# Patient Record
Sex: FEMALE | Race: WHITE | NOT HISPANIC OR LATINO | Employment: OTHER | URBAN - METROPOLITAN AREA
[De-identification: names, ages, dates, MRNs, and addresses within clinical notes are randomized per-mention and may not be internally consistent; named-entity substitution may affect disease eponyms.]

---

## 2017-03-23 ENCOUNTER — GENERIC CONVERSION - ENCOUNTER (OUTPATIENT)
Dept: OTHER | Facility: OTHER | Age: 82
End: 2017-03-23

## 2017-06-14 DIAGNOSIS — Z12.31 ENCOUNTER FOR SCREENING MAMMOGRAM FOR MALIGNANT NEOPLASM OF BREAST: ICD-10-CM

## 2017-06-14 DIAGNOSIS — E78.5 HYPERLIPIDEMIA: ICD-10-CM

## 2017-06-14 DIAGNOSIS — M79.671 PAIN OF RIGHT FOOT: ICD-10-CM

## 2017-06-14 DIAGNOSIS — M85.80 OTHER SPECIFIED DISORDERS OF BONE DENSITY AND STRUCTURE, UNSPECIFIED SITE: ICD-10-CM

## 2017-06-21 ENCOUNTER — HOSPITAL ENCOUNTER (OUTPATIENT)
Dept: RADIOLOGY | Facility: HOSPITAL | Age: 82
Discharge: HOME | End: 2017-06-21
Payer: MEDICARE

## 2017-06-21 ENCOUNTER — APPOINTMENT (OUTPATIENT)
Dept: LAB | Facility: HOSPITAL | Age: 82
End: 2017-06-21
Payer: MEDICARE

## 2017-06-21 ENCOUNTER — ALLSCRIPTS OFFICE VISIT (OUTPATIENT)
Dept: OTHER | Facility: OTHER | Age: 82
End: 2017-06-21

## 2017-06-21 ENCOUNTER — TRANSCRIBE ORDERS (OUTPATIENT)
Dept: ADMINISTRATIVE | Facility: HOSPITAL | Age: 82
End: 2017-06-21

## 2017-06-21 DIAGNOSIS — M85.80 OTHER SPECIFIED DISORDERS OF BONE DENSITY AND STRUCTURE, UNSPECIFIED SITE: ICD-10-CM

## 2017-06-21 DIAGNOSIS — E78.5 HYPERLIPIDEMIA: ICD-10-CM

## 2017-06-21 DIAGNOSIS — M79.671 PAIN OF RIGHT FOOT: ICD-10-CM

## 2017-06-21 LAB
25(OH)D3 SERPL-MCNC: 31.1 NG/ML (ref 30–100)
ALBUMIN SERPL BCP-MCNC: 3.8 G/DL (ref 3.5–5)
ALP SERPL-CCNC: 140 U/L (ref 46–116)
ALT SERPL W P-5'-P-CCNC: 30 U/L (ref 12–78)
ANION GAP SERPL CALCULATED.3IONS-SCNC: 6 MMOL/L (ref 4–13)
AST SERPL W P-5'-P-CCNC: 27 U/L (ref 5–45)
BILIRUB SERPL-MCNC: 0.7 MG/DL (ref 0.2–1)
BUN SERPL-MCNC: 18 MG/DL (ref 5–25)
CALCIUM SERPL-MCNC: 9.4 MG/DL (ref 8.3–10.1)
CHLORIDE SERPL-SCNC: 106 MMOL/L (ref 100–108)
CHOLEST SERPL-MCNC: 184 MG/DL (ref 50–200)
CO2 SERPL-SCNC: 30 MMOL/L (ref 21–32)
CREAT SERPL-MCNC: 0.89 MG/DL (ref 0.6–1.3)
ERYTHROCYTE [DISTWIDTH] IN BLOOD BY AUTOMATED COUNT: 13.4 % (ref 11.6–15.1)
GFR SERPL CREATININE-BSD FRML MDRD: >60 ML/MIN/1.73SQ M
GLUCOSE P FAST SERPL-MCNC: 97 MG/DL (ref 65–99)
HCT VFR BLD AUTO: 45.1 % (ref 34.8–46.1)
HDLC SERPL-MCNC: 48 MG/DL (ref 40–60)
HGB BLD-MCNC: 14.6 G/DL (ref 11.5–15.4)
LDLC SERPL CALC-MCNC: 103 MG/DL (ref 0–100)
MCH RBC QN AUTO: 28.7 PG (ref 26.8–34.3)
MCHC RBC AUTO-ENTMCNC: 32.4 G/DL (ref 31.4–37.4)
MCV RBC AUTO: 89 FL (ref 82–98)
PLATELET # BLD AUTO: 268 THOUSANDS/UL (ref 149–390)
PMV BLD AUTO: 10.4 FL (ref 8.9–12.7)
POTASSIUM SERPL-SCNC: 4.2 MMOL/L (ref 3.5–5.3)
PROT SERPL-MCNC: 7.3 G/DL (ref 6.4–8.2)
RBC # BLD AUTO: 5.08 MILLION/UL (ref 3.81–5.12)
SODIUM SERPL-SCNC: 142 MMOL/L (ref 136–145)
TRIGL SERPL-MCNC: 165 MG/DL
WBC # BLD AUTO: 5.97 THOUSAND/UL (ref 4.31–10.16)

## 2017-06-21 PROCEDURE — 82306 VITAMIN D 25 HYDROXY: CPT

## 2017-06-21 PROCEDURE — 36415 COLL VENOUS BLD VENIPUNCTURE: CPT

## 2017-06-21 PROCEDURE — 80053 COMPREHEN METABOLIC PANEL: CPT

## 2017-06-21 PROCEDURE — 85027 COMPLETE CBC AUTOMATED: CPT

## 2017-06-21 PROCEDURE — 80061 LIPID PANEL: CPT

## 2017-06-21 PROCEDURE — 73630 X-RAY EXAM OF FOOT: CPT

## 2017-06-22 ENCOUNTER — GENERIC CONVERSION - ENCOUNTER (OUTPATIENT)
Dept: OTHER | Facility: OTHER | Age: 82
End: 2017-06-22

## 2017-06-23 ENCOUNTER — GENERIC CONVERSION - ENCOUNTER (OUTPATIENT)
Dept: OTHER | Facility: OTHER | Age: 82
End: 2017-06-23

## 2017-06-30 ENCOUNTER — HOSPITAL ENCOUNTER (OUTPATIENT)
Dept: BONE DENSITY | Facility: IMAGING CENTER | Age: 82
Discharge: HOME | End: 2017-06-30
Payer: MEDICARE

## 2017-06-30 DIAGNOSIS — Z12.31 ENCOUNTER FOR SCREENING MAMMOGRAM FOR MALIGNANT NEOPLASM OF BREAST: ICD-10-CM

## 2017-06-30 PROCEDURE — G0202 SCR MAMMO BI INCL CAD: HCPCS

## 2017-07-03 ENCOUNTER — GENERIC CONVERSION - ENCOUNTER (OUTPATIENT)
Dept: OTHER | Facility: OTHER | Age: 82
End: 2017-07-03

## 2017-08-17 ENCOUNTER — GENERIC CONVERSION - ENCOUNTER (OUTPATIENT)
Dept: OTHER | Facility: OTHER | Age: 82
End: 2017-08-17

## 2017-09-28 ENCOUNTER — GENERIC CONVERSION - ENCOUNTER (OUTPATIENT)
Dept: FAMILY MEDICINE CLINIC | Facility: HOSPITAL | Age: 82
End: 2017-09-28

## 2017-12-11 DIAGNOSIS — F32.9 MAJOR DEPRESSIVE DISORDER, SINGLE EPISODE: ICD-10-CM

## 2017-12-11 DIAGNOSIS — E78.5 HYPERLIPIDEMIA: ICD-10-CM

## 2017-12-11 DIAGNOSIS — M85.80 OTHER SPECIFIED DISORDERS OF BONE DENSITY AND STRUCTURE, UNSPECIFIED SITE (CODE): ICD-10-CM

## 2017-12-20 ENCOUNTER — TRANSCRIBE ORDERS (OUTPATIENT)
Dept: ADMINISTRATIVE | Facility: HOSPITAL | Age: 82
End: 2017-12-20

## 2017-12-20 ENCOUNTER — ALLSCRIPTS OFFICE VISIT (OUTPATIENT)
Dept: OTHER | Facility: OTHER | Age: 82
End: 2017-12-20

## 2017-12-20 ENCOUNTER — APPOINTMENT (OUTPATIENT)
Dept: LAB | Facility: HOSPITAL | Age: 82
End: 2017-12-20
Payer: MEDICARE

## 2017-12-20 ENCOUNTER — GENERIC CONVERSION - ENCOUNTER (OUTPATIENT)
Dept: OTHER | Facility: OTHER | Age: 82
End: 2017-12-20

## 2017-12-20 DIAGNOSIS — E78.5 HYPERLIPIDEMIA: ICD-10-CM

## 2017-12-20 DIAGNOSIS — F32.9 MAJOR DEPRESSIVE DISORDER, SINGLE EPISODE: ICD-10-CM

## 2017-12-20 LAB
ALBUMIN SERPL BCP-MCNC: 3.8 G/DL (ref 3.5–5)
ALP SERPL-CCNC: 143 U/L (ref 46–116)
ALT SERPL W P-5'-P-CCNC: 29 U/L (ref 12–78)
ANION GAP SERPL CALCULATED.3IONS-SCNC: 8 MMOL/L (ref 4–13)
AST SERPL W P-5'-P-CCNC: 22 U/L (ref 5–45)
BILIRUB SERPL-MCNC: 0.6 MG/DL (ref 0.2–1)
BUN SERPL-MCNC: 23 MG/DL (ref 5–25)
CALCIUM SERPL-MCNC: 9.2 MG/DL (ref 8.3–10.1)
CHLORIDE SERPL-SCNC: 106 MMOL/L (ref 100–108)
CHOLEST SERPL-MCNC: 192 MG/DL (ref 50–200)
CO2 SERPL-SCNC: 27 MMOL/L (ref 21–32)
CREAT SERPL-MCNC: 0.9 MG/DL (ref 0.6–1.3)
ERYTHROCYTE [DISTWIDTH] IN BLOOD BY AUTOMATED COUNT: 13.4 % (ref 11.6–15.1)
GFR SERPL CREATININE-BSD FRML MDRD: 61 ML/MIN/1.73SQ M
GLUCOSE P FAST SERPL-MCNC: 102 MG/DL (ref 65–99)
HCT VFR BLD AUTO: 45.4 % (ref 34.8–46.1)
HDLC SERPL-MCNC: 55 MG/DL (ref 40–60)
HGB BLD-MCNC: 14.9 G/DL (ref 11.5–15.4)
LDLC SERPL CALC-MCNC: 113 MG/DL (ref 0–100)
MCH RBC QN AUTO: 29.2 PG (ref 26.8–34.3)
MCHC RBC AUTO-ENTMCNC: 32.8 G/DL (ref 31.4–37.4)
MCV RBC AUTO: 89 FL (ref 82–98)
PLATELET # BLD AUTO: 276 THOUSANDS/UL (ref 149–390)
PMV BLD AUTO: 10.7 FL (ref 8.9–12.7)
POTASSIUM SERPL-SCNC: 3.9 MMOL/L (ref 3.5–5.3)
PROT SERPL-MCNC: 7.2 G/DL (ref 6.4–8.2)
RBC # BLD AUTO: 5.11 MILLION/UL (ref 3.81–5.12)
SODIUM SERPL-SCNC: 141 MMOL/L (ref 136–145)
TRIGL SERPL-MCNC: 122 MG/DL
TSH SERPL DL<=0.05 MIU/L-ACNC: 2 UIU/ML (ref 0.36–3.74)
WBC # BLD AUTO: 6.03 THOUSAND/UL (ref 4.31–10.16)

## 2017-12-20 PROCEDURE — 84443 ASSAY THYROID STIM HORMONE: CPT

## 2017-12-20 PROCEDURE — 85027 COMPLETE CBC AUTOMATED: CPT

## 2017-12-20 PROCEDURE — 80061 LIPID PANEL: CPT

## 2017-12-20 PROCEDURE — 80053 COMPREHEN METABOLIC PANEL: CPT

## 2017-12-20 PROCEDURE — 36415 COLL VENOUS BLD VENIPUNCTURE: CPT

## 2018-01-11 NOTE — RESULT NOTES
Discussion/Summary   All labs excellent including sugar, cholesterol and vitamin D      Verified Results  (1) CBC/ PLT (NO DIFF) 21Jun2017 10:05AM Hayley Epperson Order Number: [id number]     Test Name Result Flag Reference   HEMATOCRIT 45 1 %  34 8-46 1   HEMOGLOBIN 14 6 g/dL  11 5-15 4   MCHC 32 4 g/dL  31 4-37 4   MCH 28 7 pg  26 8-34 3   MCV 89 fL  82-98   PLATELET COUNT 922 Thousands/uL  149-390   RBC COUNT 5 08 Million/uL  3 81-5 12   RDW 13 4 %  11 6-15 1   WBC COUNT 5 97 Thousand/uL  4 31-10 16   MPV 10 4 fL  8 9-12 7     (1) COMPREHENSIVE METABOLIC PANEL 98SNQ6937 17:47DP Hayley Epperson Order Number: [id number]     Test Name Result Flag Reference   SODIUM 142 mmol/L  136-145   POTASSIUM 4 2 mmol/L  3 5-5 3   CHLORIDE 106 mmol/L  100-108   CARBON DIOXIDE 30 mmol/L  21-32   ANION GAP (CALC) 6 mmol/L  4-13   BLOOD UREA NITROGEN 18 mg/dL  5-25   CREATININE 0 89 mg/dL  0 60-1 30   Standardized to IDMS reference method   CALCIUM 9 4 mg/dL  8 3-10 1   BILI, TOTAL 0 70 mg/dL  0 20-1 00   ALK PHOSPHATAS 140 U/L H    ALT (SGPT) 30 U/L  12-78   AST(SGOT) 27 U/L  5-45   ALBUMIN 3 8 g/dL  3 5-5 0   TOTAL PROTEIN 7 3 g/dL  6 4-8 2   eGFR Non-African American      >60 0 ml/min/1 73sq [facility] Disease Education Program recommendations are as follows:  GFR calculation is accurate only with a steady state creatinine  Chronic Kidney disease less than 60 ml/min/1 73 sq  meters  Kidney failure less than 15 ml/min/1 73 sq  meters  GLUCOSE FASTING 97 mg/dL  65-99     (1) LIPID PANEL, FASTING 21Jun2017 10:05AM Hayley Epperson Order Number: [id number]     Test Name Result Flag Reference   CHOLESTEROL 184 mg/dL     HDL,DIRECT 48 mg/dL  40-60   Specimen collection should occur prior to Metamizole administration due to the potential for falsely depressed results     LDL CHOLESTEROL CALCULATED 103 mg/dL H 0-100   Triglyceride:         Normal              <150 mg/dl       Borderline High    150-199 mg/dl       High               200-499 mg/dl       Very High          >499 mg/dl  Cholesterol:         Desirable        <200 mg/dl      Borderline High  200-239 mg/dl      High             >239 mg/dl  HDL Cholesterol:        High    >59 mg/dL      Low     <41 mg/dL  LDL CALCULATED:    This screening LDL is a calculated result  It does not have the accuracy of the Direct Measured LDL in the monitoring of patients with hyperlipidemia and/or statin therapy  Direct Measure LDL (AIH013) must be ordered separately in these patients  TRIGLYCERIDES 165 mg/dL H <=150   Specimen collection should occur prior to N-Acetylcysteine or Metamizole administration due to the potential for falsely depressed results  (1) VITAMIN D 25-HYDROXY 21Jun2017 10:05AM [facility] Order Number: [id number]     Test Name Result Flag Reference   VIT D 25-HYDROX 31 1 ng/mL  30 0-100 0   This assay is a certified procedure of the CDC Vitamin D Standardization Certification Program (VDSCP)     Deficiency <20ng/ml   Insufficiency 20-30ng/ml   Sufficient  ng/ml     *Patients undergoing fluorescein dye angiography may retain small amounts of fluorescein in the body for 48-72 hours post procedure  Samples containing fluorescein can produce falsely elevated Vitamin D values  If the patient had this procedure, a specimen should be resubmitted post fluorescein clearance            Patient notified of results

## 2018-01-13 VITALS
BODY MASS INDEX: 27.79 KG/M2 | SYSTOLIC BLOOD PRESSURE: 100 MMHG | HEART RATE: 68 BPM | WEIGHT: 151 LBS | HEIGHT: 62 IN | DIASTOLIC BLOOD PRESSURE: 60 MMHG | TEMPERATURE: 99.2 F

## 2018-01-14 NOTE — RESULT NOTES
Verified Results  (1) CBC/ PLT (NO DIFF) [id number] 09:43AM Tsering Smith Order Number: [id number]     Test Name Result Flag Reference   HEMATOCRIT 45 9 %  34 8-46 1   HEMOGLOBIN 15 0 g/dL  11 5-15 4   MCHC 32 7 g/dL  31 4-37 4   MCH 29 0 pg  26 8-34 3   MCV 89 fL  82-98   PLATELET COUNT 102 Thousands/uL  149-390   RBC COUNT 5 18 Million/uL H 3 81-5 12   RDW 13 3 %  11 6-15 1   WBC COUNT 5 42 Thousand/uL  4 31-10 16   MPV 10 3 fL  8 9-12 7     (1) COMPREHENSIVE METABOLIC PANEL 40HSO6872 58:27CS Tsering Smith Order Number: [id number]     Test Name Result Flag Reference   GLUCOSE,RANDM 97 mg/dL     If the patient is fasting, the ADA then defines impaired fasting glucose as > 100 mg/dL and diabetes as > or equal to 123 mg/dL  SODIUM 147 mmol/L H 136-145   POTASSIUM 3 6 mmol/L  3 5-5 3   CHLORIDE 107 mmol/L  100-108   CARBON DIOXIDE 30 mmol/L  21-32   ANION GAP (CALC) 10 mmol/L  4-13   BLOOD UREA NITROGEN 13 mg/dL  5-25   CREATININE 0 86 mg/dL  0 60-1 30   Standardized to IDMS reference method   CALCIUM 9 1 mg/dL  8 3-10 1   BILI, TOTAL 0 70 mg/dL  0 20-1 00   ALK PHOSPHATAS 168 U/L H    ALT (SGPT) 27 U/L  12-78   AST(SGOT) 27 U/L  5-45   ALBUMIN 3 6 g/dL  3 5-5 0   TOTAL PROTEIN 7 0 g/dL  6 4-8 2   eGFR Non-African American      >60 0 ml/min/1 73sq m   - Patient Instructions: This is a fasting blood test  Water,black tea or black  coffee only after 9:00pm the night before test Drink 2 glasses of water the morning of test - Patient Instructions: This bloodwork is non-fasting  Please drink two glasses of   water morning of bloodwork  National Kidney Disease Education Program recommendations are as follows:  GFR calculation is accurate only with a steady state creatinine  Chronic Kidney disease less than 60 ml/min/1 73 sq  meters  Kidney failure less than 15 ml/min/1 73 sq  meters       (1) LIPID PANEL, FASTING 36Hkj8656 09:43AM [address]   TW Order Number: [id number]     Test Name Result Flag Reference   CHOLESTEROL 194 mg/dL     HDL,DIRECT 61 mg/dL H 40-60   Specimen collection should occur prior to Metamizole administration due to the potential for falsely depressed results  LDL CHOLESTEROL CALCULATED 105 mg/dL H 0-100   - Patient Instructions: This is a fasting blood test  Water,black tea or black  coffee only after 9:00pm the night before test   Drink 2 glasses of water the morning of test     - Patient Instructions: This is a fasting blood test  Water,black tea or black  coffee only after 9:00pm the night before test Drink 2 glasses of water the morning of test - Patient Instructions: This bloodwork is non-fasting  Please drink two glasses of   water morning of bloodwork  Triglyceride:         Normal              <150 mg/dl       Borderline High    150-199 mg/dl       High               200-499 mg/dl       Very High          >499 mg/dl  Cholesterol:         Desirable        <200 mg/dl      Borderline High  200-239 mg/dl      High             >239 mg/dl  HDL Cholesterol:        High    >59 mg/dL      Low     <41 mg/dL  LDL CALCULATED:    This screening LDL is a calculated result  It does not have the accuracy of the Direct Measured LDL in the monitoring of patients with hyperlipidemia and/or statin therapy  Direct Measure LDL (OPQ224) must be ordered separately in these patients  TRIGLYCERIDES 139 mg/dL  <=150   Specimen collection should occur prior to N-Acetylcysteine or Metamizole administration due to the potential for falsely depressed results  (1) TSH 52Qru7586 09:43AM Garrett Roe Order Number: [id number]     Test Name Result Flag Reference   TSH 1 370 uIU/mL  0 358-3 740   - Patient Instructions: This bloodwork is non-fasting  Please drink two glasses of water morning of bloodwork  - Patient Instructions:  This is a fasting blood test  Water,black tea or black  coffee only after 9:00pm the night before test Drink 2 glasses of water the morning of test - Patient Instructions: This bloodwork is non-fasting  Please drink two glasses of   water morning of bloodwork  Patients undergoing fluorescein dye angiography may retain small amounts of fluorescein in the body for 48-72 hours post procedure  Samples containing fluorescein can produce falsely depressed TSH values  If the patient had this procedure,a specimen should be resubmitted post fluorescein clearance  The recommended reference ranges for TSH during pregnancy are as follows:  First trimester 0 1 to 2 5 uIU/mL  Second trimester  0 2 to 3 0 uIU/mL  Third trimester 0 3 to 3 0 uIU/m       Discussion/Summary   All labs excellent for sugar, cholesterol, liver kidney and thyroid

## 2018-01-15 NOTE — PROGRESS NOTES
Assessment    1  Encounter for preventive health examination (V70 0) (Z00 00)   2  Hyperlipidemia (272 4) (E78 5)   3  Anxiety disorder (300 00) (F41 9)   4  Benign paroxysmal positional vertigo (386 11) (H81 10)    Plan  Dizziness    · Meclizine HCl - 25 MG Oral Tablet (Antivert); TAKE 1 TABLET EVERY 6 HOURS  AS NEEDED FOR DIZZINESS  Health Maintenance    · *VB - Fall Risk Assessment  (Dx Z13 89 Screen for Neurologic Disorder);  Status:Complete;   Done: 98UQJ2291 09:13AM   · *VB - Urinary Incontinence Screen (Dx Z13 89 Screen for UI); Status:Complete;   Done:  31WQE8394 09:13AM   · Eat a low fat and low cholesterol diet ; Status:Complete;   Done: 27NQG6191   · Stretch and warm up your muscles during the first 10 minutes , then cool down your  muscles for the last 10 minutes of exercise ; Status:Complete;   Done: 06ASP8020   · The plan of care for falls is detailed in the plan and/or discussion section of today's note ;  Status:Complete;   Done: 75GPX5356   · The plan of care for urinary incontinence is detailed in the plan and/or discussion section  of today's note ; Status:Complete;   Done: 57XTJ6197   · We recommend that you create an advance directive ; Status:Complete;   Done:  77CZF6559   · Follow-up visit in 6 months Evaluation and Treatment  Follow-up  Status: Hold For -  Scheduling  Requested for: 65Txo7413  Hyperlipidemia    · (1) CBC/ PLT (NO DIFF); Status:Active; Requested for:51Ajl8003;    · (1) COMPREHENSIVE METABOLIC PANEL; Status:Active; Requested for:31Jri3057;    · (1) LIPID PANEL, FASTING; Status:Active; Requested for:97Kgg2895;    · (1) TSH; Status:Active; Requested for:25Amf8865;     Discussion/Summary    Medically stable on minimal medications  Advise probiotics for abdominal bloating  Impression: Subsequent Annual Wellness Visit, with preventive exam as well as age and risk appropriate counseling completed       Cardiovascular screening and counseling: the risks and benefits of screening were discussed  Diabetes screening and counseling: screening is current  Colorectal cancer screening and counseling: screening is current  Breast cancer screening and counseling: screening is current  Cervical cancer screening and counseling: screening not indicated  Osteoporosis screening and counseling: due for DXA axial skeleton  Abdominal aortic aneurysm screening and counseling: screening not indicated  Glaucoma screening and counseling: screening is current  HIV screening and counseling: screening not indicated  Immunizations: influenza vaccine is due today, the patient declines the pneumococcal vaccination, hepatitis B vaccination series is not indicated at this time due to the patient's low risk of irma the disease, the patient declines the Zostavax vaccine, the patient declines the Td vaccine and the patient declines the Tdap vaccine  Advance Directive Planning: not complete, paperwork and instructions were given to the patient  Advice and education were given regarding fall risk reduction  She was referred to none  Medical Equipment/Suppliers: none  Patient Discussion: plan discussed with the patient, follow-up visit needed in 6 month  Patient is able to Self-Care  Chief Complaint  HM      History of Present Illness  HPI: Some occasional urinary frequency and usually is not an issue  Ongoing problem with dizziness, Meclizine is so-so  Using Pristiq and discussed all the side effects of sweating and some constipation  Bloating at times in the abdomen       Welcome to Medicare and Wellness Visits: The patient is being seen for the subsequent annual wellness visit  Medicare Screening and Risk Factors   Hospitalizations: no previous hospitalizations  Once per lifetime medicare screening tests: ECG has not been done and AAA screening US has not yet been done  Medicare Screening Tests Risk Questions   Abdominal aortic aneurysm risk assessment: none indicated  Osteoporosis risk assessment: , female gender and over 48years of age  HIV risk assessment: none indicated  Drug and Alcohol Use: The patient has never smoked cigarettes and has never used smokeless tobacco  The patient reports occasional alcohol use  She has never used illicit drugs  Diet and Physical Activity: Current diet includes well balanced meals, 1 servings of fruit per day, 1 servings of vegetables per day, 1 servings of dairy products per day and 1 cups of coffee per day  She exercises infrequently and exercises daily  Exercise: walking  Mood Disorder and Cognitive Impairment Screening: PHQ-9 Depression Scale   Depression screening  negative for symptoms  She denies feeling down, depressed, or hopeless over the past two weeks  She denies feeling little interest or pleasure in doing things over the past two weeks  Cognitive impairment screening: denies difficulty learning/retaining new information, denies difficulty handling complex tasks, denies difficulty with reasoning, denies difficulty with spatial ability and orientation, denies difficulty with language and denies difficulty with behavior  Functional Ability/Level of Safety: Hearing is normal bilaterally, normal in the right ear, normal in the left ear and a hearing aid is not used  The patient is currently able to do activities of daily living with limitations and unable to participate in social activities, but able to do instrumental activities of daily living without limitations and able to drive without limitations  Activities of daily living details: does not need help using the phone, no transportation help needed, does not need help shopping, no meal preparation help needed, does not need help doing housework, does not need help doing laundry, does not need help managing medications and does not need help managing money   Fall risk factors:  antidepressant use and visual impairment, but no urinary incontinence and no cognitive impairment  Home safety risk factors:  no unfamiliar surroundings, no loose rugs and no poor household lighting  Advance Directives: Advance directives: living will and durable power of  for health care directives, but no advance directives  end of life decisions were reviewed with the patient and I agree with the patient's decisions  Co-Managers and Medical Equipment/Suppliers: See Patient Care Team       Reviewed Updated [facility]:   Last Medicare Wellness Visit Information was reviewed, patient interviewed and updates made to the record today  Preventive Quality Program 65 and Older: Falls Risk: The patient fell 0 times in the past 12 months  The patient is currently asymptomatic Symptoms Include:  Associated symptoms:  No associated symptoms are reported no impaired mobility and no impaired ability to live independently  Urinary Incontinence Symptoms includes: urinary frequency and nocturia    Avoid fluids in the evening  Patient Care Team    Care Team Member Role Specialty Office Number   García Antonio MD  Family Medicine [phone]     Review of Systems    Constitutional: no malaise and no fatigue  ENT: no nasal congestion and no scratchy throat  Cardiovascular: no chest pain, the heart is not racing and no lightheadedness  Respiratory: no shortness of breath and no cough  Gastrointestinal: no nausea and no constipation  Genitourinary: no dysuria  Neurological: no headache  Psychiatric: depression, but as noted in HPI  Over the past 2 weeks, how often have you been bothered by the following problems? 1 ) Little interest or pleasure in doing things? Not at all    2 ) Feeling down, depressed or hopeless? Not at all    3 ) Trouble falling asleep or sleeping too much? Not at all    4 ) Feeling tired or having little energy? Not at all    5 ) Poor appetite or overeating?  Not at all    6 ) Feeling bad about yourself, or that you are a failure, or have let yourself or your family down? Not at all    7 ) Trouble concentrating on things, such as reading a newspaper or watching television? Not at all    8 ) Moving or speaking so slowly that other people could have noticed, or the opposite, moving or speaking faster than usual? Not at all    9 ) Thoughts that you would be better off dead or of hurting yourself in some way? Not at all  Score 0      Active Problems    1  Abdominal pain, epigastric (789 06) (R10 13)   2  Anorexia (783 0) (R63 0)   3  Anxiety disorder (300 00) (F41 9)   4  Atypical chest pain (786 59) (R07 89)   5  Benign paroxysmal positional vertigo (386 11) (H81 10)   6  Depression (311) (F32 9)   7  Dizziness (780 4) (R42)   8  Dysfunction of eustachian tube, unspecified laterality (381 81) (H69 80)   9  Encounter for screening colonoscopy (V76 51) (Z12 11)   10  Encounter for screening mammogram for malignant neoplasm of breast (V76 12)    (Z12 31)   11  Esophagitis, reflux (530 11) (K21 0)   12  Glaucoma (365 9) (H40 9)   13  Hyperlipidemia (272 4) (E78 5)   14  Osteopenia (733 90) (M85 80)   15  Screening for osteoporosis (V82 81) (Z13 820)    Past Medical History    · History of acute bronchitis (V12 69) (Z87 09)    The active problems and past medical history were reviewed and updated today  Family History  Mother    · Family history of Hypertension (V17 49)  Father    · Family history of cerebrovascular accident (V17 1) (Z82 3)    The family history was reviewed and updated today  Social History    · Being A Social Drinker   · Marital History -  (V61 03)   · Never A Smoker   · Non-smoker (V49 89) (Z78 9)  The social history was reviewed and updated today  The social history was reviewed and is unchanged  Current Meds   1  Align Oral Capsule; USE AS DIRECTED; Therapy: 38VWT7743 to Recorded   2  ALPRAZolam 0 5 MG Oral Tablet; TAKE 1 TAB EVERY 4-6 HOURS AS NEEDED FOR   ANXIETY;    Therapy: 10NOX5733 to (Last Rx:01Sep2016) Ordered   3  Aspirin EC 81 MG Oral Tablet Delayed Release; Therapy: (Gama Kellogg) to Recorded   4  Atorvastatin Calcium 20 MG Oral Tablet; take 1 tablet by mouth every day; Therapy: 61Ewy8344 to (Evaluate:06Jan2017)  Requested for: 12Jan2016; Last   Rx:12Jan2016 Ordered   5  Azopt 1 % Ophthalmic Suspension; Therapy: 12RFZ3399 to (Evaluate:24Jhu6200) Recorded   6  Fish Oil CAPS Recorded   7  FLUoxetine HCl - 20 MG Oral Capsule; TAKE 2 CAPSULES DAILY; Therapy: 81DKI1991 to (Delta Stade)  Requested for: 11BUC3462; Last   Rx:05Jun2015 Ordered   8  Fluticasone Propionate 50 MCG/ACT Nasal Suspension; USE 2 SPRAYS IN EACH   NOSTRIL ONCE DAILY; Therapy: 27FQW3827 to (Last Gaston Coad)  Requested for: 04NQC4140 Ordered   9  Meclizine HCl - 25 MG Oral Tablet; TAKE 1 TABLET EVERY 6 HOURS AS NEEDED FOR   DIZZINESS; Therapy: 76VPI3351 to (Evaluate:02Mhq3303)  Requested for: 35RQO9197; Last   Rx:05Jun2015 Ordered   10  Multiple Vitamins Oral Tablet; Take 1 tablet daily Recorded   11  Pristiq 50 MG Oral Tablet Extended Release 24 Hour; Take 1 tablet daily; Therapy: 40PKH0150 to (Last Rx:77Goe1569)  Requested for: 95Yfd7140 Ordered   12  Travatan Z 0 004 % Ophthalmic Solution; Therapy: 35CQM5554 to (Evaluate:48Uvv7375) Recorded   13  Vitamin B-12 CHEW;    Therapy: (Recorded:25Jvz8210) to Recorded    The medication list was reviewed and updated today  Allergies    1  No Known Drug Allergies    Immunizations   1    Influenza  Temporarily Deferred: Pt requests deferral    PCV  Temporarily Deferred: Pt refuses    Zoster  Temporarily Deferred: Pt requests deferral     Vitals  Signs    Temperature: 98 5 F  Heart Rate: 62  Systolic: 418  Diastolic: 80  Height: 5 ft 2 in  Weight: 150 lb   BMI Calculated: 27 44  BSA Calculated: 1 69    Physical Exam    Constitutional   General appearance: No acute distress, well appearing and well nourished      Ears, Nose, Mouth, and Throat   External inspection of ears and nose: Normal     Otoscopic examination: Tympanic membranes translucent with normal light reflex  Canals patent without erythema  Hearing: Normal     Neck   Neck: Supple, symmetric, trachea midline, no masses  Thyroid: Normal, no thyromegaly  Pulmonary   Respiratory effort: No increased work of breathing or signs of respiratory distress  Auscultation of lungs: Clear to auscultation  Cardiovascular   Auscultation of heart: Normal rate and rhythm, normal S1 and S2, no murmurs  Carotid pulses: 2+ bilaterally  Peripheral vascular exam: Normal     Examination of extremities for edema and/or varicosities: Normal     Lymphatic   Palpation of lymph nodes in neck: No lymphadenopathy  Musculoskeletal   Gait and station: Normal     Psychiatric   Judgment and insight: Normal     Orientation to person, place, and time: Normal     Recent and remote memory: Intact  Mood and affect: Normal        Procedure    Procedure: Visual Acuity Test    Indication: routine screening  Inforrmation supplied by a Snellen chart     Results: 20/40 in the right eye with corrective device, 20/50 in the left eye with corrective device      Signatures   Electronically signed by : Patsy Calderon MD; Dec 21 2016  9:31AM EST                       (Author)

## 2018-01-15 NOTE — RESULT NOTES
Verified Results  (1) CBC/PLT/DIFF [id number] 11:22AM Lyudmila Wilson Order Number: [id number]   Order Number: [id number]     Test Name Result Flag Reference   WBC COUNT 5 82 Thousand/uL  4 31-10 16   RBC COUNT 5 28 Million/uL H 3 81-5 12   HEMOGLOBIN 15 4 g/dL  11 5-15 4   HEMATOCRIT 47 5 % H 34 8-46  1   MCV 90 fL  82-98   MCH 29 2 pg  26 8-34 3   MCHC 32 4 g/dL  31 4-37 4   RDW 13 6 %  11 6-15 1   MPV 11 1 fL  8 9-12 7   PLATELET COUNT 458 Thousands/uL  149-390   NEUTROPHILS RELATIVE PERCENT 32 % L 43-75   LYMPHOCYTES RELATIVE PERCENT 52 % H 14-44   MONOCYTES RELATIVE PERCENT 12 %  4-12   EOSINOPHILS RELATIVE PERCENT 3 %  0-6   BASOPHILS RELATIVE PERCENT 1 %  0-1   NEUTROPHILS ABSOLUTE COUNT 1 86 Thousands/?L  1 85-7 62   LYMPHOCYTES ABSOLUTE COUNT 3 08 Thousands/?L  0 60-4 47   MONOCYTES ABSOLUTE COUNT 0 68 Thousand/?L  0 17-1 22   EOSINOPHILS ABSOLUTE COUNT 0 17 Thousand/?L  0 00-0 61   BASOPHILS ABSOLUTE COUNT 0 03 Thousands/?L  0 00-0 10     (1) COMPREHENSIVE METABOLIC PANEL 86UXA4237 08:11NG Lyudmila Wilson Order Number: [id number]   Order Number: [id number] Order Number: [id number]     Test Name Result Flag Reference   GLUCOSE,RANDM 101 mg/dL     If the patient is fasting, the ADA then defines impaired fasting glucose as > 100 mg/dL and diabetes as > or equal to 123 mg/dL     SODIUM 144 mmol/L  136-145   POTASSIUM 4 0 mmol/L  3 5-5 3   CHLORIDE 106 mmol/L  100-108   CARBON DIOXIDE 30 mmol/L  21-32   ANION GAP (CALC) 8 mmol/L  4-13   BLOOD UREA NITROGEN 17 mg/dL  5-25   CREATININE 0 90 mg/dL  0 60-1 30   Standardized to IDMS reference method   CALCIUM 9 2 mg/dL  8 3-10 1   BILI, TOTAL 0 70 mg/dL  0 20-1 00   ALK PHOSPHATAS 159 U/L H    ALT (SGPT) 37 U/L  12-78   AST(SGOT) 39 U/L  5-45   ALBUMIN 3 7 g/dL  3 5-5 0   TOTAL PROTEIN 7 3 g/dL  6 4-8 2   eGFR Non-African American      >60 0 ml/min/1 73sq m   National Kidney Disease Education Program recommendations are as follows:  GFR calculation is accurate only with a steady state creatinine  Chronic Kidney disease less than 60 ml/min/1 73 sq  meters  Kidney failure less than 15 ml/min/1 73 sq  meters  (1) LIPID PANEL, FASTING 07OKB2730 11:22AM Pierre Junior Order Number: [id number]   Order Number: [id number] Order Number: [id number]     Test Name Result Flag Reference   CHOLESTEROL 181 mg/dL     HDL,DIRECT 54 mg/dL  40-60   Specimen collection should occur prior to Metamizole administration due to the potential for falsely depressed results  LDL CHOLESTEROL CALCULATED 106 mg/dL H 0-100   Triglyceride:         Normal              <150 mg/dl       Borderline High    150-199 mg/dl       High               200-499 mg/dl       Very High          >499 mg/dl  Cholesterol:         Desirable        <200 mg/dl      Borderline High  200-239 mg/dl      High             >239 mg/dl  HDL Cholesterol:        High    >59 mg/dL      Low     <41 mg/dL  LDL CALCULATED:    This screening LDL is a calculated result  It does not have the accuracy of the Direct Measured LDL in the monitoring of patients with hyperlipidemia and/or statin therapy  Direct Measure LDL (OEP973) must be ordered separately in these patients  TRIGLYCERIDES 106 mg/dL  <=150   Specimen collection should occur prior to N-Acetylcysteine or Metamizole administration due to the potential for falsely depressed results  Pt aware  1      1 Amended By: Pamela Ashley; May 17 2016 10:11 AM EST    Discussion/Summary   Labs are good for sugar, liver and kidney and cholesterol

## 2018-01-16 NOTE — RESULT NOTES
Message   Patient aware of results  1       1 Amended By: Olga Rashid; Jun 01 2016 3:56 PM EST    Verified Results  * MAMMO SCREENING BILATERAL W CAD 01Jun2016 09:53AM Pierre Junior Order Number: [id number]     Test Name Result Flag Reference   MAMMO SCREENING BILATERAL W CAD (Report)     Patient History:   Family history of breast cancer in daughter under age 48  Benign excisional biopsy of the right breast, 1996  Patient has never smoked  Patient's BMI is 25 7  Reason for exam: screening (asymptomatic)  Mammo Screening Bilateral W CAD: June 1, 2016 - Check In #:    [de-identified]   Bilateral CC and MLO view(s) were taken  Technologist: ROHIT Holden (R)(M)   Prior study comparison: May 27, 2015, bilateral OPMA digital scrn   mammo w/CAD performed at [address]  October 25, 2007, bilateral DIGTL UNILAT MAMM/CAD,    performed at [address]  July 20, 2007,    bilateral digital screening mammo w/CAD performed at [address] [facility]  There are scattered fibroglandular densities  The parenchymal pattern appears stable  No dominant soft tissue    mass or suspicious calcifications are noted  Scattered benign    appearing calcifications are seen  The skin and nipple contours    are within normal limits  No mammographic evidence of malignancy  No    significant changes when compared with prior studies  ASSESSMENT: BiRad:1 - Negative     Recommendation:   Routine screening mammogram in 1 year  A reminder letter will be   scheduled  Analyzed by CAD     8-10% of cancers will be missed on mammography  Management of a    palpable abnormality must be based on clinical grounds  Patients   will be notified of their results via letter from our facility  Accredited by [employer] and FDA       Transcription Location: [facility] 98: [id number]     Risk Value(s): Neal-Marlon 10 Year: 2 570%, Neal-Marlon Lifetime: 2 570%,    Myriad Table: 2 6%, HE 5 Year: 4 1%, NCI Lifetime: 6 8%   Signed by:   Wesley More MD   6/1/16       Discussion/Summary   Mammogram normal  Recheck in one year

## 2018-01-16 NOTE — RESULT NOTES
Discussion/Summary   Xray shows heel spur  [facility] evaluation     Verified Results  * XR FOOT 3+ VIEW RIGHT 21Jun2017 10:04AM Serge Mensah Order Number: [id number]     Test Name Result Flag Reference   XR FOOT 3+ VW RIGHT (Report)     RIGHT FOOT     INDICATION: Right foot plantar pain for a month  COMPARISON: None     VIEWS: 3     IMAGES: 3     FINDINGS:     There is no acute fracture or dislocation  Small plantar calcaneal spur  No lytic or blastic lesions are seen  Soft tissues are unremarkable  IMPRESSION:     No acute osseous abnormality  Small plantar calcaneal spur  Workstation performed: [id number]     Signed by:    Mimi Amezcua MD   6/23/17        Pt aware--given number to Dr Christian Mckeon

## 2018-01-17 NOTE — RESULT NOTES
Discussion/Summary   Mammogram is good  Repeat in one year  Verified Results  * MAMMO SCREENING BILATERAL W CAD [id number] 10:50AM Christopher Slade Order Number: [id number]    - Patient Instructions: To schedule this appointment, please contact Central Scheduling at [phone]  Do not wear any perfume, powder, lotion or deodorant on breast or underarm area  Please bring your doctors order, referral (if needed) and insurance information with you on the day of the test  Failure to bring this information may result in this test being rescheduled  Arrive 15 minutes prior to your appointment time to register  On the day of your test, please bring any prior mammogram or breast studies with you that were not performed at a [facility]  Failure to bring prior exams may result in your test needing to be rescheduled  Test Name Result Flag Reference   MAMMO SCREENING BILATERAL W CAD (Report)     Patient History:   Family history of breast cancer under age 48 in daughter  Benign excisional biopsy of the right breast, 1996  Patient has never smoked  Patient's BMI is 25 7  Reason for exam: screening, asymptomatic  Mammo Screening Bilateral W CAD: June 30, 2017 - Check In #:    [de-identified]   Bilateral CC and MLO view(s) were taken  Technologist: ROHIT Galdamez (ROHIT)(M)   Prior study comparison: June 1, 2016, mammo screening bilateral W   CAD performed at [address] [facility]  May 27, 2015, bilateral OPMA digital scrn mammo w/CAD performed    at [address] [facility]  October 25, 2007, bilateral DIGTL UNILAT MAMM/CAD, performed at [facility]  July 20, 2007, bilateral digital screening    mammo w/CAD performed at [address] [facility]  There are scattered fibroglandular densities       No dominant soft tissue mass, architectural distortion or    suspicious calcifications are noted in either breast   The skin    and nipple structures are within normal limits  Scattered benign   appearing calcifications are noted  No evidence of malignancy  No significant changes when compared with prior studies  ACR BI-RADSï¾® Assessments: BiRad:2 - Benign     Recommendation:   Routine screening mammogram of both breasts in 1 year  A    reminder letter will be scheduled  Analyzed by CAD     The patient is scheduled in a reminder system  8-10% of cancers will be missed on mammography  Management of a    palpable abnormality must be based on clinical grounds  Patients   will be notified of their results via letter from our facility  Accredited by [employer] and FDA       Transcription Location: [facility] 98: [id number]     Risk Value(s):   Tyrer-Cuzick 10 Year: 1 900%, Tyrer-Cuzick Lifetime: 1 900%,    Myriad Table: 2 6%, HE 5 Year: 4 0%, NCI Lifetime: 6 1%   Signed by:   Arianne Paulino MD   6/30/17          Patient notified of results

## 2018-01-23 VITALS
DIASTOLIC BLOOD PRESSURE: 72 MMHG | RESPIRATION RATE: 16 BRPM | HEART RATE: 72 BPM | WEIGHT: 153.6 LBS | TEMPERATURE: 98.4 F | BODY MASS INDEX: 28.26 KG/M2 | HEIGHT: 62 IN | SYSTOLIC BLOOD PRESSURE: 122 MMHG

## 2018-01-23 NOTE — PROGRESS NOTES
Assessment    1  Encounter for preventive health examination (V70 0) (Z00 00)   2  Hyperlipidemia (272 4) (E78 5)   3  Depression (311) (F32 9)    Plan  Depression    · (1) TSH WITH FT4 REFLEX; Status:Active; Requested for:45Hpv2751;    · Follow-up visit in 6 months Evaluation and Treatment  Follow-up  Status: Hold For -  Scheduling  Requested for: 96Wtg2838  Glaucoma    · Caroline ESPINOSA, Katy Banegas  (Ophthalmology) Co-Management  *  Status: Hold For - Scheduling   Requested for: 99CKN0829  Care Summary provided  : Yes  Health Maintenance    · *VB - Fall Risk Assessment  (Dx Z13 89 Screen for Neurologic Disorder);  Status:Complete;   Done: 78HYB0476 07:11AM   · *VB - Urinary Incontinence Screen (Dx Z13 89 Screen for UI); Status:Complete;   Done:  87MIO3265 07:11AM   · Follow-up visit in 1 year Evaluation and Treatment  Follow-up  Status: Hold For -  Scheduling  Requested for: 95Tar3924   · Stretch and warm up your muscles during the first 10 minutes , then cool down your  muscles for the last 10 minutes of exercise ; Status:Complete;   Done: 96SHJ4160   · The plan of care for falls is detailed in the plan and/or discussion section of today's note ;  Status:Complete;   Done: 29DYS5474   · The plan of care for urinary incontinence is detailed in the plan and/or discussion section  of today's note ; Status:Complete;   Done: 62EEN7818   · We recommend that you create an advance directive ; Status:Complete;   Done:  29XCB5095  Hyperlipidemia    · (1) CBC/ PLT (NO DIFF); Status:Active; Requested for:84Bna5189;    · (1) COMPREHENSIVE METABOLIC PANEL; Status:Active; Requested for:36Dui6600;    · (1) LIPID PANEL, FASTING; Status:Active; Requested for:09Ips6879;     Discussion/Summary    Medically stable on minimal medications  Depression stable on current medication  Glaucoma checked regularly  Refer to Dr Tere Palafox for evaluation         Impression: Subsequent Annual Wellness Visit, with preventive exam as well as age and risk appropriate counseling completed  Cardiovascular screening and counseling: screening is current  Diabetes screening and counseling: screening is current  Colorectal cancer screening and counseling: screening is current  Breast cancer screening and counseling: due for a screening mammogram    Cervical cancer screening and counseling: screening not indicated  Osteoporosis screening and counseling: the patient declines screening  Abdominal aortic aneurysm screening and counseling: screening not indicated  Glaucoma screening and counseling: screening is current  HIV screening and counseling: screening not indicated  Immunizations: influenza vaccine is due today, the patient declines the pneumococcal vaccination, hepatitis B vaccination series is not indicated at this time due to the patient's low risk of irma the disease, the patient declines the Zostavax vaccine, the patient declines the Td vaccine and the patient declines the Tdap vaccine  Advance Directive Planning: not complete, paperwork and instructions were given to the patient  Advice and education were given regarding fall risk reduction  She was referred to none  Medical Equipment/Suppliers: none  Patient Discussion: plan discussed with the patient, follow-up visit needed in 6 month  Self Referrals: No       Possible side effects of new medications were reviewed with the patient/guardian today  The treatment plan was reviewed with the patient/guardian  The patient/guardian understands and agrees with the treatment plan       Patient is able to Self-Care  Chief Complaint  HM      Advance Directives  Advance Directive [facility]:   NO - Patient does not have an advance health care directive  Capacity/Competence: This patient has full decision making capacity for discussion of advance care planning and This patient has full decision making competency for discussion of advance care planning        History of Present Illness  Welcome to Medicare and Wellness Visits: The patient is being seen for the subsequent annual wellness visit  Medicare Screening and Risk Factors   Hospitalizations: no previous hospitalizations  Once per lifetime medicare screening tests: ECG has not been done and AAA screening US has not yet been done  Medicare Screening Tests Risk Questions   Abdominal aortic aneurysm risk assessment: none indicated  Osteoporosis risk assessment: , female gender and over 48years of age  HIV risk assessment: none indicated  Drug and Alcohol Use: The patient has never smoked cigarettes and has never used smokeless tobacco  The patient reports occasional alcohol use  She has never used illicit drugs  Diet and Physical Activity: Current diet includes well balanced meals, 1 servings of fruit per day, 1 servings of vegetables per day, 1 servings of dairy products per day and 1 cups of coffee per day  She exercises infrequently and exercises daily  Exercise: walking  Mood Disorder and Cognitive Impairment Screening: PHQ-9 Depression Scale   Depression screening  negative for symptoms  She denies feeling down, depressed, or hopeless over the past two weeks  She denies feeling little interest or pleasure in doing things over the past two weeks  Cognitive impairment screening: denies difficulty learning/retaining new information, denies difficulty handling complex tasks, denies difficulty with reasoning, denies difficulty with spatial ability and orientation, denies difficulty with language and denies difficulty with behavior  Advance Directives: Advance directives: living will and durable power of  for health care directives, but no advance directives  end of life decisions were reviewed with the patient and I agree with the patient's decisions     Co-Managers and Medical Equipment/Suppliers: See Patient Care Team       Reviewed Updated [facility]:   Last Medicare Wellness Visit Information was reviewed, patient interviewed and updates made to the record today  Preventive Quality Program 65 and Older: Falls Risk: The patient fell 0 times in the past 12 months  The patient is currently asymptomatic Symptoms Include:  Associated symptoms:  No associated symptoms are reported no impaired mobility and no impaired ability to live independently  Urinary Incontinence Symptoms includes: urinary frequency and nocturia    Avoid fluids in the evening  Patient Care Team    Care Team Member Role Specialty Office Number   Linda Sixto ESPINOSA  Family Medicine [phone]     Review of Systems    Constitutional: no malaise and no fatigue  ENT: no nasal congestion and no scratchy throat  Cardiovascular: no chest pain, the heart is not racing and no lightheadedness  Respiratory: no shortness of breath and no cough  Gastrointestinal: no nausea and no constipation  Genitourinary: no dysuria  Neurological: no headache  Psychiatric: depression, but as noted in HPI  Over the past 2 weeks, how often have you been bothered by the following problems? 1 ) Little interest or pleasure in doing things? Not at all    2 ) Feeling down, depressed or hopeless? Not at all    3 ) Trouble falling asleep or sleeping too much? Not at all    4 ) Feeling tired or having little energy? Not at all    5 ) Poor appetite or overeating? Not at all    6 ) Feeling bad about yourself, or that you are a failure, or have let yourself or your family down? Not at all    7 ) Trouble concentrating on things, such as reading a newspaper or watching television? Not at all    8 ) Moving or speaking so slowly that other people could have noticed, or the opposite, moving or speaking faster than usual? Not at all    9 ) Thoughts that you would be better off dead or of hurting yourself in some way? Not at all  Score 0      Active Problems    1  Anxiety disorder (300 00) (F41 9)   2   Benign paroxysmal positional vertigo (386 11) (H81 10)   3  Depression (311) (F32 9)   4  Dizziness (780 4) (R42)   5  Encounter for screening mammogram for breast cancer (V76 12) (Z12 31)   6  Encounter for screening mammogram for malignant neoplasm of breast (V76 12)   (Z12 31)   7  Esophagitis, reflux (530 11) (K21 0)   8  Glaucoma (365 9) (H40 9)   9  Hyperlipidemia (272 4) (E78 5)   10  Osteopenia (733 90) (M85 80)   11  Pain of right heel (729 5) (M79 671)    Past Medical History    · Denied: History of Alcohol abuse   · Denied: History of substance abuse    The active problems and past medical history were reviewed and updated today  Family History  Mother    · Family history of Hypertension (V17 49)  Father    · Family history of cerebrovascular accident (V17 1) (Z80 1)  Son    · Family history of Alcohol abuse  Family History    · Denied: Family history of substance abuse   · Denied: Family history of Mental health problem    The family history was reviewed and updated today  Social History    · Always uses seat belt   · Being A Social Drinker   · Marital History -  (V61 03)   · Never A Smoker  The social history was reviewed and updated today  The social history was reviewed and is unchanged  Current Meds   1  Align Oral Capsule; USE AS DIRECTED; Therapy: 01EOA4522 to Recorded   2  ALPRAZolam 0 5 MG Oral Tablet; TAKE 1 TAB EVERY 4-6 HOURS AS NEEDED FOR   ANXIETY; Therapy: 25ADR8938 to (Last Rx:29Nov2017) Ordered   3  Aspirin EC 81 MG Oral Tablet Delayed Release; Therapy: (Adebayo Jimenez) to Recorded   4  Atorvastatin Calcium 20 MG Oral Tablet; take 1 tablet by mouth every day; Therapy: 92Vqh9287 to (Evaluate:90Zfy9716)  Requested for: 88QYB4485; Last   Rx:04Jan2017 Ordered   5  Azopt 1 % Ophthalmic Suspension; Therapy: 41ZTS8125 to (Evaluate:54Zmx2642) Recorded   6  Desvenlafaxine Succinate ER 50 MG Oral Tablet Extended Release 24 Hour; Take 1   tablet daily;    Therapy: 73PBK6626 to (Evaluate:03Apr2018)  Requested for: 02KOQ3453; Last   Rx:05Oct2017 Ordered   7  Fish Oil CAPS Recorded   8  FLUoxetine HCl - 20 MG Oral Capsule; TAKE 2 CAPSULES DAILY; Therapy: 24WTC1385 to (Sofia Blackwood)  Requested for: 80TDX2068; Last   Rx:15Wao2920 Ordered   9  Fluticasone Propionate 50 MCG/ACT Nasal Suspension; USE 2 SPRAYS IN EACH   NOSTRIL ONCE DAILY; Therapy: 60IYQ3005 to (Zackary Kim)  Requested for: 71BPI7223 Ordered   10  Meclizine HCl - 25 MG Oral Tablet; TAKE 1 TABLET EVERY 6 HOURS AS NEEDED FOR    DIZZINESS; Therapy: 86XRP5680 to (Evaluate:10Ajp6956)  Requested for: 42PYJ8371; Last    Rx:06Oct2017 Ordered   11  Multiple Vitamins Oral Tablet; Take 1 tablet daily Recorded   12  Travatan Z 0 004 % Ophthalmic Solution; Therapy: 19ZVE4708 to (Evaluate:05Mve4911) Recorded   13  Vitamin B-12 CHEW;    Therapy: (Recorded:46Ioy3036) to Recorded    The medication list was reviewed and updated today  Allergies    1  No Known Drug Allergies    Immunizations   1    Influenza  Temporarily Deferred: Pt requests deferral    PCV  Temporarily Deferred: Pt refuses    Zoster  Temporarily Deferred: Pt requests deferral     Vitals  Signs    Temperature: 98 4 F  Heart Rate: 72  Respiration: 16  Systolic: 997  Diastolic: 72  Height: 5 ft 2 in  Weight: 153 lb 9 6 oz  BMI Calculated: 28 09  BSA Calculated: 1 71    Physical Exam    Constitutional   General appearance: No acute distress, well appearing and well nourished  Ears, Nose, Mouth, and Throat   External inspection of ears and nose: Normal     Otoscopic examination: Tympanic membranes translucent with normal light reflex  Canals patent without erythema  Hearing: Normal     Neck   Neck: Supple, symmetric, trachea midline, no masses  Thyroid: Normal, no thyromegaly  Pulmonary   Respiratory effort: No increased work of breathing or signs of respiratory distress  Auscultation of lungs: Clear to auscultation      Cardiovascular   Auscultation of heart: Normal rate and rhythm, normal S1 and S2, no murmurs  Carotid pulses: 2+ bilaterally  Peripheral vascular exam: Normal     Examination of extremities for edema and/or varicosities: Normal     Lymphatic   Palpation of lymph nodes in neck: No lymphadenopathy  Musculoskeletal   Gait and station: Normal     Psychiatric   Judgment and insight: Normal     Orientation to person, place, and time: Normal     Recent and remote memory: Intact  Mood and affect: Normal        Results/Data  *VB - Fall Risk Assessment  (Dx Z13 89 Screen for Neurologic Disorder) 06Fco1254 07:11AM Harry Sink     Test Name Result Flag Reference   Falls Risk      No falls in the past year     *VB - Urinary Incontinence Screen (Dx Z13 89 Screen for UI) 02Jvm2423 07:11AM Harry Sink     Test Name Result Flag Reference   Urinary Incontinence Assessment 52Jfb5480         Procedure    Procedure: Visual Acuity Test    Indication: routine screening  Inforrmation supplied by a Snellen chart     Results: 20/40 in both eyes with corrective device, 20/50 in the right eye with corrective device, 20/70 in the left eye with corrective device      Signatures   Electronically signed by : Jose Dixon MD; Dec 20 2017 11:04AM EST                       (Author)

## 2018-01-23 NOTE — RESULT NOTES
Discussion/Summary    All labs excellent for sugar, cholesterol, liver, kidney and thyroid  pt aware         Verified Results  (1) CBC/ PLT (NO DIFF) [id number] 11:13AM Zoltan Sands Order Number: [id number]     Test Name Result Flag Reference   HEMATOCRIT 45 4 %  34 8-46 1   HEMOGLOBIN 14 9 g/dL  11 5-15 4   MCHC 32 8 g/dL  31 4-37 4   MCH 29 2 pg  26 8-34 3   MCV 89 fL  82-98   PLATELET COUNT 332 Thousands/uL  149-390   RBC COUNT 5 11 Million/uL  3 81-5 12   RDW 13 4 %  11 6-15 1   WBC COUNT 6 03 Thousand/uL  4 31-10 16   MPV 10 7 fL  8 9-12 7     (1) COMPREHENSIVE METABOLIC PANEL [id number] 24:05VE Zoltan Sands Order Number: [id number]     Test Name Result Flag Reference   SODIUM 141 mmol/L  136-145   POTASSIUM 3 9 mmol/L  3 5-5 3   CHLORIDE 106 mmol/L  100-108   CARBON DIOXIDE 27 mmol/L  21-32   ANION GAP (CALC) 8 mmol/L  4-13   BLOOD UREA NITROGEN 23 mg/dL  5-25   CREATININE 0 90 mg/dL  0 60-1 30   Standardized to IDMS reference method   CALCIUM 9 2 mg/dL  8 3-10 1   BILI, TOTAL 0 60 mg/dL  0 20-1 00   ALK PHOSPHATAS 143 U/L H    ALT (SGPT) 29 U/L  12-78   Specimen collection should occur prior to Sulfasalazine administration due to the potential for falsely depressed results  AST(SGOT) 22 U/L  5-45   Specimen collection should occur prior to Sulfasalazine administration due to the potential for falsely depressed results  ALBUMIN 3 8 g/dL  3 5-5 0   TOTAL PROTEIN 7 2 g/dL  6 4-8 2   eGFR 61 ml/min/1 73sq m     National Kidney Disease Education Program recommendations are as follows:  GFR calculation is accurate only with a steady state creatinine  Chronic Kidney disease less than 60 ml/min/1 73 sq  meters  Kidney failure less than 15 ml/min/1 73 sq  meters  GLUCOSE FASTING 102 mg/dL H 65-99   Specimen collection should occur prior to Sulfasalazine administration due to the potential for falsely depressed results   Specimen collection should occur prior to Sulfapyridine administration due to the potential for falsely elevated results  (1) LIPID PANEL, FASTING 20Dec2017 11:13AM [facility]: [id number]     Test Name Result Flag Reference   CHOLESTEROL 192 mg/dL     HDL,DIRECT 55 mg/dL  40-60   Specimen collection should occur prior to Metamizole administration due to the potential for falsley depressed results  LDL CHOLESTEROL CALCULATED 113 mg/dL H 0-100   Triglyceride:        Normal <150 mg/dl   Borderline High 150-199 mg/dl   High 200-499 mg/dl   Very High >499 mg/dl      Cholesterol:       Desirable <200 mg/dl    Borderline High 200-239 mg/dl    High >239 mg/dl      HDL Cholesterol:       High>59 mg/dL    Low <41 mg/dL      This screening LDL is a calculated result  It does not have the accuracy of the Direct Measured LDL in the monitoring of patients with hyperlipidemia and/or statin therapy  Direct Measure LDL (QMC378) must be ordered separately in these patients  TRIGLYCERIDES 122 mg/dL  <=150   Specimen collection should occur prior to N-Acetylcysteine or Metamizole administration due to the potential for falsely depressed results  (1) TSH WITH FT4 REFLEX 20Dec2017 11:13AM Torin Santee Order Number: [id number]     Test Name Result Flag Reference   TSH 1 997 uIU/mL  0 358-3 740   Patients undergoing fluorescein dye angiography may retain small amounts of fluorescein in the body for 48-72 hours post procedure  Samples containing fluorescein can produce falsely depressed TSH values  If the patient had this procedure,a specimen should be resubmitted post fluorescein clearance            The recommended reference ranges for TSH during pregnancy are as follows:  First trimester 0 1 to 2 5 uIU/mL  Second trimester  0 2 to 3 0 uIU/mL  Third trimester 0 3 to 3 0 uIU/m

## 2018-02-15 PROBLEM — M79.671 PAIN OF RIGHT HEEL: Status: ACTIVE | Noted: 2017-06-21

## 2018-02-15 RX ORDER — FLUTICASONE PROPIONATE 50 MCG
2 SPRAY, SUSPENSION (ML) NASAL DAILY
COMMUNITY
Start: 2012-05-22 | End: 2019-11-05

## 2018-02-15 RX ORDER — ALPRAZOLAM 0.5 MG/1
TABLET ORAL
Refills: 1 | COMMUNITY
Start: 2018-02-02 | End: 2018-05-21 | Stop reason: SDUPTHER

## 2018-02-15 RX ORDER — DESVENLAFAXINE 50 MG/1
50 TABLET, EXTENDED RELEASE ORAL DAILY
Refills: 5 | COMMUNITY
Start: 2018-01-29 | End: 2018-03-30 | Stop reason: SDUPTHER

## 2018-02-15 RX ORDER — BRINZOLAMIDE 1 %
SUSPENSION, DROPS(FINAL DOSAGE FORM)(ML) OPHTHALMIC (EYE)
Refills: 4 | COMMUNITY
Start: 2017-12-20 | End: 2019-11-05

## 2018-02-15 RX ORDER — MECLIZINE HYDROCHLORIDE 25 MG/1
TABLET ORAL
Refills: 5 | COMMUNITY
Start: 2018-01-19 | End: 2019-11-05

## 2018-02-15 RX ORDER — BROMFENAC SODIUM 0.7 MG/ML
SOLUTION/ DROPS OPHTHALMIC
COMMUNITY
Start: 2018-02-09 | End: 2019-11-05

## 2018-02-15 RX ORDER — ATORVASTATIN CALCIUM 20 MG/1
20 TABLET, FILM COATED ORAL DAILY
Refills: 3 | COMMUNITY
Start: 2017-12-30 | End: 2018-03-30 | Stop reason: SDUPTHER

## 2018-02-15 RX ORDER — ASPIRIN 81 MG/1
TABLET ORAL
COMMUNITY

## 2018-02-15 RX ORDER — TRAVOPROST 0.004 %
1 DROPS OPHTHALMIC (EYE)
Refills: 5 | COMMUNITY
Start: 2018-02-03

## 2018-02-15 RX ORDER — OCTISALATE, AVOBENZONE, HOMOSALATE, AND OCTOCRYLENE 29.4; 29.4; 49; 25.48 MG/ML; MG/ML; MG/ML; MG/ML
LOTION TOPICAL
COMMUNITY
Start: 2015-12-07 | End: 2019-11-05

## 2018-02-15 RX ORDER — FLUOXETINE HYDROCHLORIDE 20 MG/1
2 CAPSULE ORAL DAILY
COMMUNITY
Start: 2011-03-16 | End: 2019-11-05

## 2018-02-16 ENCOUNTER — OFFICE VISIT (OUTPATIENT)
Dept: FAMILY MEDICINE CLINIC | Facility: HOSPITAL | Age: 83
End: 2018-02-16
Payer: MEDICARE

## 2018-02-16 VITALS
SYSTOLIC BLOOD PRESSURE: 134 MMHG | HEIGHT: 62 IN | BODY MASS INDEX: 28.6 KG/M2 | HEART RATE: 68 BPM | DIASTOLIC BLOOD PRESSURE: 76 MMHG | WEIGHT: 155.4 LBS | TEMPERATURE: 99.5 F

## 2018-02-16 DIAGNOSIS — H26.9 CATARACT OF BOTH EYES, UNSPECIFIED CATARACT TYPE: Primary | ICD-10-CM

## 2018-02-16 DIAGNOSIS — F41.9 ANXIETY DISORDER, UNSPECIFIED TYPE: ICD-10-CM

## 2018-02-16 DIAGNOSIS — F33.1 MODERATE EPISODE OF RECURRENT MAJOR DEPRESSIVE DISORDER (HCC): ICD-10-CM

## 2018-02-16 PROCEDURE — 99214 OFFICE O/P EST MOD 30 MIN: CPT | Performed by: FAMILY MEDICINE

## 2018-02-16 NOTE — PROGRESS NOTES
Assessment/Plan:         Diagnoses and all orders for this visit:    Cataract of both eyes, unspecified cataract type  Comments:  Medically clear for cataract surgery OU in sequence with Dr Royce Coulter    Moderate episode of recurrent major depressive disorder [facility])  Comments:  Extended discussion about sources of depression   OK to increase Pristiq to 2 daily    Anxiety disorder, unspecified type          Subjective:      Patient ID: Charisse Gardner is a 82 y o  female  Scheduled for eye surgery with Dr Royce Coulter- cataract 2/21 and 3/7/18  Having higher amount of depression- wonders about increasing her Pristiq  Having L scapular and LS pain  Depression has been worse overall but continues on Pristiq  Wonders about increase in dose  Lots of financial worries, death in family members, etc        The following portions of the patient's history were reviewed and updated as appropriate: allergies, current medications, past family history, past medical history, past social history, past surgical history and problem list     Review of Systems   Constitutional: Positive for fatigue  Negative for chills, diaphoresis and fever  HENT: Negative for dental problem and postnasal drip  Eyes: Positive for visual disturbance  Negative for pain  Respiratory: Negative for cough, choking, chest tightness and shortness of breath  Cardiovascular: Negative for chest pain  Gastrointestinal: Negative for abdominal pain  Endocrine: Negative for polyuria  Genitourinary: Negative for difficulty urinating  Musculoskeletal: Positive for arthralgias and back pain  Neurological: Negative for headaches  Psychiatric/Behavioral: Positive for dysphoric mood  Negative for hallucinations and suicidal ideas  The patient is nervous/anxious            Objective:      /76 (BP Location: Left arm, Patient Position: Sitting, Cuff Size: Standard)   Pulse 68   Temp 99 5 °F (37 5 °C) (Tympanic)   Ht 5' 2" (1 575 m)   Wt 70 5 kg (155 lb 6 4 oz)   BMI 28 42 kg/m²          Physical Exam   Constitutional: She is oriented to person, place, and time  She appears well-developed  HENT:   Head: Normocephalic and atraumatic  Right Ear: External ear normal    Left Ear: External ear normal    Nose: Nose normal    Mouth/Throat: Oropharynx is clear and moist    Eyes: Conjunctivae are normal    Neck: No thyromegaly present  Cardiovascular: Normal rate, normal heart sounds and intact distal pulses  Pulmonary/Chest: Effort normal and breath sounds normal    Musculoskeletal: She exhibits no edema  Neurological: She is oriented to person, place, and time  Skin: Skin is warm and dry  Psychiatric: She has a normal mood and affect   Her behavior is normal  Judgment and thought content normal

## 2018-03-30 DIAGNOSIS — E78.2 MIXED HYPERLIPIDEMIA: ICD-10-CM

## 2018-03-30 DIAGNOSIS — F32.A DEPRESSION, UNSPECIFIED DEPRESSION TYPE: Primary | ICD-10-CM

## 2018-03-30 RX ORDER — ATORVASTATIN CALCIUM 20 MG/1
TABLET, FILM COATED ORAL
Qty: 90 TABLET | Refills: 0 | Status: SHIPPED | OUTPATIENT
Start: 2018-03-30 | End: 2018-06-27 | Stop reason: SDUPTHER

## 2018-03-30 RX ORDER — DESVENLAFAXINE 50 MG/1
TABLET, EXTENDED RELEASE ORAL
Qty: 30 TABLET | Refills: 5 | Status: SHIPPED | OUTPATIENT
Start: 2018-03-30 | End: 2018-09-21 | Stop reason: SDUPTHER

## 2018-05-21 DIAGNOSIS — F41.9 ANXIETY: Primary | ICD-10-CM

## 2018-05-21 RX ORDER — ALPRAZOLAM 0.5 MG/1
TABLET ORAL
Qty: 30 TABLET | Refills: 3 | Status: SHIPPED | OUTPATIENT
Start: 2018-05-21 | End: 2018-11-15 | Stop reason: SDUPTHER

## 2018-06-24 RX ORDER — NAPROXEN 500 MG/1
TABLET ORAL
Refills: 0 | COMMUNITY
Start: 2018-04-17 | End: 2019-11-05

## 2018-06-25 ENCOUNTER — APPOINTMENT (OUTPATIENT)
Dept: LAB | Facility: HOSPITAL | Age: 83
End: 2018-06-25
Payer: MEDICARE

## 2018-06-25 ENCOUNTER — OFFICE VISIT (OUTPATIENT)
Dept: FAMILY MEDICINE CLINIC | Facility: HOSPITAL | Age: 83
End: 2018-06-25
Payer: MEDICARE

## 2018-06-25 VITALS
DIASTOLIC BLOOD PRESSURE: 70 MMHG | BODY MASS INDEX: 28.34 KG/M2 | SYSTOLIC BLOOD PRESSURE: 110 MMHG | TEMPERATURE: 99.1 F | HEART RATE: 72 BPM | HEIGHT: 62 IN | WEIGHT: 154 LBS

## 2018-06-25 DIAGNOSIS — Z12.31 SCREENING MAMMOGRAM, ENCOUNTER FOR: ICD-10-CM

## 2018-06-25 DIAGNOSIS — F41.9 ANXIETY DISORDER, UNSPECIFIED TYPE: ICD-10-CM

## 2018-06-25 DIAGNOSIS — R73.9 HYPERGLYCEMIA: ICD-10-CM

## 2018-06-25 DIAGNOSIS — M50.90 CERVICAL DISC DISEASE: ICD-10-CM

## 2018-06-25 DIAGNOSIS — E78.2 MIXED HYPERLIPIDEMIA: ICD-10-CM

## 2018-06-25 DIAGNOSIS — E78.2 MIXED HYPERLIPIDEMIA: Primary | ICD-10-CM

## 2018-06-25 DIAGNOSIS — F33.1 MODERATE EPISODE OF RECURRENT MAJOR DEPRESSIVE DISORDER (HCC): ICD-10-CM

## 2018-06-25 LAB
ALBUMIN SERPL BCP-MCNC: 3.7 G/DL (ref 3.5–5)
ALP SERPL-CCNC: 148 U/L (ref 46–116)
ALT SERPL W P-5'-P-CCNC: 32 U/L (ref 12–78)
ANION GAP SERPL CALCULATED.3IONS-SCNC: 8 MMOL/L (ref 4–13)
AST SERPL W P-5'-P-CCNC: 32 U/L (ref 5–45)
BILIRUB SERPL-MCNC: 1 MG/DL (ref 0.2–1)
BUN SERPL-MCNC: 15 MG/DL (ref 5–25)
CALCIUM SERPL-MCNC: 8.9 MG/DL (ref 8.3–10.1)
CHLORIDE SERPL-SCNC: 106 MMOL/L (ref 100–108)
CHOLEST SERPL-MCNC: 198 MG/DL (ref 50–200)
CO2 SERPL-SCNC: 28 MMOL/L (ref 21–32)
CREAT SERPL-MCNC: 0.96 MG/DL (ref 0.6–1.3)
EST. AVERAGE GLUCOSE BLD GHB EST-MCNC: 126 MG/DL
GFR SERPL CREATININE-BSD FRML MDRD: 56 ML/MIN/1.73SQ M
GLUCOSE P FAST SERPL-MCNC: 105 MG/DL (ref 65–99)
HBA1C MFR BLD: 6 % (ref 4.2–6.3)
HDLC SERPL-MCNC: 48 MG/DL (ref 40–60)
LDLC SERPL CALC-MCNC: 123 MG/DL (ref 0–100)
POTASSIUM SERPL-SCNC: 4.4 MMOL/L (ref 3.5–5.3)
PROT SERPL-MCNC: 7.2 G/DL (ref 6.4–8.2)
SODIUM SERPL-SCNC: 142 MMOL/L (ref 136–145)
TRIGL SERPL-MCNC: 136 MG/DL

## 2018-06-25 PROCEDURE — 80061 LIPID PANEL: CPT

## 2018-06-25 PROCEDURE — 36415 COLL VENOUS BLD VENIPUNCTURE: CPT

## 2018-06-25 PROCEDURE — 99214 OFFICE O/P EST MOD 30 MIN: CPT | Performed by: FAMILY MEDICINE

## 2018-06-25 PROCEDURE — 83036 HEMOGLOBIN GLYCOSYLATED A1C: CPT

## 2018-06-25 PROCEDURE — 80053 COMPREHEN METABOLIC PANEL: CPT

## 2018-06-25 NOTE — PROGRESS NOTES
Assessment/Plan:         Diagnoses and all orders for this visit:    Mixed hyperlipidemia  -     Comprehensive metabolic panel; Future  -     Lipid Panel with Direct LDL reflex; Future    Moderate episode of recurrent major depressive disorder (HCC)  Comments:  Currently stable with Fluoxetine increase    Anxiety disorder, unspecified type  Comments:  Stable with hs Alprazolam    Hyperglycemia  -     HEMOGLOBIN A1C W/ EAG ESTIMATION; Future    Cervical disc disease  Comments:  Offer PT if persistent    Screening mammogram, encounter for  -     Mammo screening bilateral w 3d & cad; Future          Subjective:      Patient ID: Dyane Nyhan is a 82 y o  female  Had pain L scapular area with neck pain[facility]  Was offered medication but too expensive    No recent illness  Stressed with close deaths in family and friends    Doing better with increased dose of Prozac and PRN Alprazolam     Dizziness comes and goes with certain head positional changes  The following portions of the patient's history were reviewed and updated as appropriate: allergies, current medications, past family history, past medical history, past social history, past surgical history and problem list     Review of Systems   Constitutional: Negative  HENT: Negative  Eyes: Positive for visual disturbance  Respiratory: Negative  Cardiovascular: Negative  Gastrointestinal: Negative  Genitourinary: Negative  Musculoskeletal: Positive for neck pain  Skin: Negative  Allergic/Immunologic: Negative  Neurological: Positive for dizziness  Hematological: Negative  Psychiatric/Behavioral: Positive for sleep disturbance  The patient is nervous/anxious            Objective:      /70 (BP Location: Left arm, Patient Position: Sitting, Cuff Size: Large)   Pulse 72   Temp 99 1 °F (37 3 °C) (Tympanic)   Ht 5' 2" (1 575 m)   Wt 69 9 kg (154 lb)   BMI 28 17 kg/m²          Physical Exam   Constitutional: She is oriented to person, place, and time  She appears well-developed and well-nourished  Cardiovascular: Normal rate and regular rhythm  Pulmonary/Chest: Breath sounds normal    Musculoskeletal: She exhibits no edema  Cervical back: She exhibits decreased range of motion and tenderness  Neurological: She is oriented to person, place, and time  Skin: No rash noted  Psychiatric: She has a normal mood and affect  Her behavior is normal  Judgment and thought content normal    Nursing note and vitals reviewed

## 2018-06-27 DIAGNOSIS — E78.2 MIXED HYPERLIPIDEMIA: ICD-10-CM

## 2018-06-27 RX ORDER — ATORVASTATIN CALCIUM 20 MG/1
TABLET, FILM COATED ORAL
Qty: 90 TABLET | Refills: 0 | Status: SHIPPED | OUTPATIENT
Start: 2018-06-27 | End: 2018-09-24 | Stop reason: SDUPTHER

## 2018-06-29 ENCOUNTER — HOSPITAL ENCOUNTER (OUTPATIENT)
Dept: MAMMOGRAPHY | Facility: CLINIC | Age: 83
Discharge: HOME | End: 2018-06-29
Payer: MEDICARE

## 2018-06-29 DIAGNOSIS — Z12.31 SCREENING MAMMOGRAM, ENCOUNTER FOR: ICD-10-CM

## 2018-06-29 PROCEDURE — 77067 SCR MAMMO BI INCL CAD: CPT

## 2018-06-29 PROCEDURE — 77063 BREAST TOMOSYNTHESIS BI: CPT

## 2018-09-21 DIAGNOSIS — F32.A DEPRESSION, UNSPECIFIED DEPRESSION TYPE: ICD-10-CM

## 2018-09-21 RX ORDER — DESVENLAFAXINE 50 MG/1
TABLET, EXTENDED RELEASE ORAL
Qty: 30 TABLET | Refills: 5 | Status: SHIPPED | OUTPATIENT
Start: 2018-09-21 | End: 2019-03-12 | Stop reason: SDUPTHER

## 2018-09-24 DIAGNOSIS — E78.2 MIXED HYPERLIPIDEMIA: ICD-10-CM

## 2018-09-24 RX ORDER — ATORVASTATIN CALCIUM 20 MG/1
TABLET, FILM COATED ORAL
Qty: 90 TABLET | Refills: 0 | Status: SHIPPED | OUTPATIENT
Start: 2018-09-24 | End: 2018-12-22 | Stop reason: SDUPTHER

## 2018-11-15 DIAGNOSIS — F41.9 ANXIETY: ICD-10-CM

## 2018-11-15 RX ORDER — ALPRAZOLAM 0.5 MG/1
TABLET ORAL
Qty: 30 TABLET | Refills: 3 | Status: SHIPPED | OUTPATIENT
Start: 2018-11-15 | End: 2019-03-13 | Stop reason: SDUPTHER

## 2018-12-22 DIAGNOSIS — E78.2 MIXED HYPERLIPIDEMIA: ICD-10-CM

## 2018-12-23 RX ORDER — ATORVASTATIN CALCIUM 20 MG/1
TABLET, FILM COATED ORAL
Qty: 90 TABLET | Refills: 0 | Status: SHIPPED | OUTPATIENT
Start: 2018-12-23 | End: 2019-03-21 | Stop reason: SDUPTHER

## 2019-01-07 ENCOUNTER — OFFICE VISIT (OUTPATIENT)
Dept: FAMILY MEDICINE CLINIC | Facility: HOSPITAL | Age: 84
End: 2019-01-07
Payer: MEDICARE

## 2019-01-07 ENCOUNTER — APPOINTMENT (OUTPATIENT)
Dept: LAB | Facility: HOSPITAL | Age: 84
End: 2019-01-07
Payer: MEDICARE

## 2019-01-07 VITALS
BODY MASS INDEX: 27.36 KG/M2 | WEIGHT: 154.4 LBS | TEMPERATURE: 97.2 F | DIASTOLIC BLOOD PRESSURE: 82 MMHG | HEART RATE: 66 BPM | SYSTOLIC BLOOD PRESSURE: 144 MMHG | HEIGHT: 63 IN

## 2019-01-07 DIAGNOSIS — R73.9 HYPERGLYCEMIA: ICD-10-CM

## 2019-01-07 DIAGNOSIS — E78.2 MIXED HYPERLIPIDEMIA: ICD-10-CM

## 2019-01-07 DIAGNOSIS — F32.0 CURRENT MILD EPISODE OF MAJOR DEPRESSIVE DISORDER WITHOUT PRIOR EPISODE (HCC): ICD-10-CM

## 2019-01-07 DIAGNOSIS — F32.1 CURRENT MODERATE EPISODE OF MAJOR DEPRESSIVE DISORDER WITHOUT PRIOR EPISODE (HCC): Primary | ICD-10-CM

## 2019-01-07 DIAGNOSIS — F06.4 ANXIETY DISORDER DUE TO KNOWN PHYSIOLOGICAL CONDITION: ICD-10-CM

## 2019-01-07 DIAGNOSIS — H81.10 BENIGN PAROXYSMAL POSITIONAL VERTIGO, UNSPECIFIED LATERALITY: ICD-10-CM

## 2019-01-07 DIAGNOSIS — F32.1 CURRENT MODERATE EPISODE OF MAJOR DEPRESSIVE DISORDER WITHOUT PRIOR EPISODE (HCC): ICD-10-CM

## 2019-01-07 DIAGNOSIS — Z00.00 MEDICARE ANNUAL WELLNESS VISIT, SUBSEQUENT: Primary | ICD-10-CM

## 2019-01-07 LAB
ALBUMIN SERPL BCP-MCNC: 3.8 G/DL (ref 3.5–5)
ALP SERPL-CCNC: 133 U/L (ref 46–116)
ALT SERPL W P-5'-P-CCNC: 34 U/L (ref 12–78)
ANION GAP SERPL CALCULATED.3IONS-SCNC: 9 MMOL/L (ref 4–13)
AST SERPL W P-5'-P-CCNC: 28 U/L (ref 5–45)
BASOPHILS # BLD AUTO: 0 THOUSANDS/ΜL (ref 0–0.1)
BASOPHILS NFR BLD AUTO: 0 % (ref 0–1)
BILIRUB SERPL-MCNC: 0.7 MG/DL (ref 0.2–1)
BUN SERPL-MCNC: 19 MG/DL (ref 5–25)
CALCIUM SERPL-MCNC: 9.2 MG/DL (ref 8.3–10.1)
CHLORIDE SERPL-SCNC: 105 MMOL/L (ref 100–108)
CHOLEST SERPL-MCNC: 204 MG/DL (ref 50–200)
CO2 SERPL-SCNC: 27 MMOL/L (ref 21–32)
CREAT SERPL-MCNC: 0.8 MG/DL (ref 0.6–1.3)
EOSINOPHIL # BLD AUTO: 0.04 THOUSAND/ΜL (ref 0–0.61)
EOSINOPHIL NFR BLD AUTO: 1 % (ref 0–6)
ERYTHROCYTE [DISTWIDTH] IN BLOOD BY AUTOMATED COUNT: 12.6 % (ref 11.6–15.1)
EST. AVERAGE GLUCOSE BLD GHB EST-MCNC: 128 MG/DL
GFR SERPL CREATININE-BSD FRML MDRD: 69 ML/MIN/1.73SQ M
GLUCOSE P FAST SERPL-MCNC: 97 MG/DL (ref 65–99)
HBA1C MFR BLD: 6.1 % (ref 4.2–6.3)
HCT VFR BLD AUTO: 46.6 % (ref 34.8–46.1)
HDLC SERPL-MCNC: 56 MG/DL (ref 40–60)
HGB BLD-MCNC: 14.8 G/DL (ref 11.5–15.4)
IMM GRANULOCYTES # BLD AUTO: 0.02 THOUSAND/UL (ref 0–0.2)
IMM GRANULOCYTES NFR BLD AUTO: 0 % (ref 0–2)
LDLC SERPL CALC-MCNC: 120 MG/DL (ref 0–100)
LYMPHOCYTES # BLD AUTO: 2.72 THOUSANDS/ΜL (ref 0.6–4.47)
LYMPHOCYTES NFR BLD AUTO: 46 % (ref 14–44)
MCH RBC QN AUTO: 28.7 PG (ref 26.8–34.3)
MCHC RBC AUTO-ENTMCNC: 31.8 G/DL (ref 31.4–37.4)
MCV RBC AUTO: 91 FL (ref 82–98)
MONOCYTES # BLD AUTO: 0.44 THOUSAND/ΜL (ref 0.17–1.22)
MONOCYTES NFR BLD AUTO: 7 % (ref 4–12)
NEUTROPHILS # BLD AUTO: 2.75 THOUSANDS/ΜL (ref 1.85–7.62)
NEUTS SEG NFR BLD AUTO: 46 % (ref 43–75)
NRBC BLD AUTO-RTO: 0 /100 WBCS
PLATELET # BLD AUTO: 286 THOUSANDS/UL (ref 149–390)
PMV BLD AUTO: 10.4 FL (ref 8.9–12.7)
POTASSIUM SERPL-SCNC: 3.9 MMOL/L (ref 3.5–5.3)
PROT SERPL-MCNC: 7.7 G/DL (ref 6.4–8.2)
RBC # BLD AUTO: 5.15 MILLION/UL (ref 3.81–5.12)
SODIUM SERPL-SCNC: 141 MMOL/L (ref 136–145)
TRIGL SERPL-MCNC: 141 MG/DL
TSH SERPL DL<=0.05 MIU/L-ACNC: 1.58 UIU/ML (ref 0.36–3.74)
WBC # BLD AUTO: 5.97 THOUSAND/UL (ref 4.31–10.16)

## 2019-01-07 PROCEDURE — 80053 COMPREHEN METABOLIC PANEL: CPT

## 2019-01-07 PROCEDURE — 83036 HEMOGLOBIN GLYCOSYLATED A1C: CPT

## 2019-01-07 PROCEDURE — 85025 COMPLETE CBC W/AUTO DIFF WBC: CPT

## 2019-01-07 PROCEDURE — 84443 ASSAY THYROID STIM HORMONE: CPT

## 2019-01-07 PROCEDURE — 36415 COLL VENOUS BLD VENIPUNCTURE: CPT

## 2019-01-07 PROCEDURE — 99214 OFFICE O/P EST MOD 30 MIN: CPT | Performed by: FAMILY MEDICINE

## 2019-01-07 PROCEDURE — 80061 LIPID PANEL: CPT

## 2019-01-07 NOTE — PATIENT INSTRUCTIONS

## 2019-01-07 NOTE — PROGRESS NOTES
Assessment/Plan:         Diagnoses and all orders for this visit:    Medicare annual wellness visit, subsequent    Benign paroxysmal positional vertigo, unspecified laterality  Comments:  Discussed return to ENT or Neurology if needed    Current mild episode of major depressive disorder without prior episode ([address] )  Comments:  Mood stable    Anxiety disorder due to known physiological condition  Comments:  Stable mood on Alprazolam hs    Mixed hyperlipidemia  Comments:  Due for fasting lipids    Hyperglycemia  Comments:  Check for A1c level          Subjective:      Patient ID: Buck Martínez is a 82 y o  female  6 month follow up    Some ongoing dizziness related to her vertigo  No falls    Concerns about depression, lots of family problems  She denies any thoughts of SI  She uses Alprazolam at night because she otherwise is too anxious to sleep  Wants to exercise with walking outside but is afraid of falling  Had good result with cataract surgery OU, follows with Dr Rashel Vuong        The following portions of the patient's history were reviewed and updated as appropriate: allergies, current medications, past family history, past medical history, past social history, past surgical history and problem list     Review of Systems   Constitutional: Negative for diaphoresis, fatigue and unexpected weight change  HENT: Negative  Respiratory: Negative  Cardiovascular: Negative  Gastrointestinal: Negative  Musculoskeletal: Negative  Neurological: Positive for dizziness  Psychiatric/Behavioral: Positive for dysphoric mood  The patient is nervous/anxious  All other systems reviewed and are negative  Objective:      /82   Pulse 66   Temp (!) 97 2 °F (36 2 °C)   Ht 5' 3" (1 6 m)   Wt 70 kg (154 lb 6 4 oz)   BMI 27 35 kg/m²          Physical Exam   Constitutional: She is oriented to person, place, and time  She appears well-developed and well-nourished  Neck: No thyromegaly present  Cardiovascular: Normal rate, regular rhythm and intact distal pulses  Murmur heard  Pulmonary/Chest: Effort normal and breath sounds normal  No respiratory distress  Musculoskeletal: She exhibits no edema  Neurological: She is alert and oriented to person, place, and time  Psychiatric: She has a normal mood and affect  Her behavior is normal  Judgment and thought content normal    Nursing note and vitals reviewed

## 2019-01-07 NOTE — PROGRESS NOTES
Assessment and Plan:  Problem List Items Addressed This Visit     Anxiety disorder    Benign paroxysmal positional vertigo    Depression    Hyperlipidemia    Hyperglycemia    Medicare annual wellness visit, subsequent - Primary        Health Maintenance Due   Topic Date Due    Medicare Annual Wellness Visit (AWV)  1935    DTaP,Tdap,and Td Vaccines (1 - Tdap) 01/09/1958    Fall Risk  01/09/2002    Urinary Incontinence Screening  01/09/2002    Pneumococcal PPSV23/PCV13 65+ Years / Low and Medium Risk (1 of 2 - PCV13) 01/09/2002    INFLUENZA VACCINE  07/01/2018         HPI:  Patient Active Problem List   Diagnosis    Anxiety disorder    Benign paroxysmal positional vertigo    Depression    Esophagitis, reflux    Glaucoma    Hyperlipidemia    Osteopenia    Pain of right heel    Hyperglycemia    Cervical disc disease    Medicare annual wellness visit, subsequent     Past Medical History:   Diagnosis Date    Anxiety     Depression     Glaucoma     Hyperlipemia      Past Surgical History:   Procedure Laterality Date    APPENDECTOMY      HYSTERECTOMY       Family History   Problem Relation Age of Onset    Hypertension Mother     Depression Mother     Stroke Father     Alcohol abuse Son     Substance Abuse Son      History   Smoking Status    Never Smoker   Smokeless Tobacco    Never Used     History   Alcohol Use    Yes     Comment: Rarely      History   Drug Use No         Current Outpatient Prescriptions   Medication Sig Dispense Refill    ALPRAZolam (XANAX) 0 5 mg tablet every 4 to 6 hours as needed 30 tablet 3    aspirin (ECOTRIN LOW STRENGTH) 81 mg EC tablet Take by mouth      atorvastatin (LIPITOR) 20 mg tablet TAKE 1 TABLET BY MOUTH EVERY DAY 90 tablet 0    AZOPT 1 % ophthalmic suspension INSTILL 1 DROP IN LEFT EYE TWICE A DAY  4    desvenlafaxine succinate (PRISTIQ) 50 mg 24 hr tablet TAKE 1 TABLET DAILY 30 tablet 5    meclizine (ANTIVERT) 25 mg tablet TAKE 1 TABLET BY MOUTH EVERY 6 HOURS AS NEEDED FOR DIZZINESS  5    MULTIPLE VITAMINS-CALCIUM PO Take 1 tablet by mouth daily      Omega-3 Fatty Acids (FISH OIL) 645 MG CAPS Take by mouth      TRAVATAN Z 0 004 % ophthalmic solution Administer 1 drop to both eyes daily at bedtime  5    Cyanocobalamin (HM SUPER VITAMIN B12) 2500 MCG CHEW Chew      FLUoxetine (PROzac) 20 mg capsule Take 2 capsules by mouth daily      fluticasone (FLONASE) 50 mcg/act nasal spray 2 sprays into each nostril daily      naproxen (NAPROSYN) 500 mg tablet TAKE 1 TABLET(S) TWICE A DAY BY ORAL ROUTE   0    Probiotic Product (ALIGN) 4 MG CAPS Take by mouth      PROLENSA 0 07 % SOLN        No current facility-administered medications for this visit  No Known Allergies  There is no immunization history for the selected administration types on file for this patient  Patient Care Team:  Vielka Torre MD as PCP - General  Vielka Torre MD    Medicare Screening Tests and Risk Assessments:  Bárbara Camacho is here for her Subsequent Wellness visit  Health Risk Assessment:  Patient rates overall health as very good  Patient feels that their physical health rating is Same  Eyesight was rated as Slightly worse  Hearing was rated as Slightly worse  Patient feels that their emotional and mental health rating is Same  Pain experienced by patient in the last 7 days has been None  Patient states that she has experienced no weight loss or gain in last 6 months  Emotional/Mental Health:  Patient has been feeling nervous/anxious  PHQ-9 Depression Screening:    Frequency of the following problems over the past two weeks:      1  Little interest or pleasure in doing things: 0 - not at all      2  Feeling down, depressed, or hopeless: 0 - not at all  PHQ-2 Score: 0          Broken Bones/Falls:     Fall Risk Assessment:    In the past year, patient has experienced: No history of falling in past year          Bladder/Bowel:  Patient has not leaked urine accidently in the last six months  Patient reports no loss of bowel control  Immunizations:  Patient has not had a flu vaccination within the last year  Patient has not received a pneumonia shot  Patient has not received a shingles shot  Patient has not received tetanus/diphtheria shot  Home Safety:  Patient does not have trouble with stairs inside or outside of their home  Patient currently reports that there are no safety hazards present in home, working smoke alarms, no working carbon monoxide detectors  Preventative Screenings:   Breast cancer screening performed, 6/29/2018  no colon cancer screen completed, cholesterol screen completed, 6/25/2018  glaucoma eye exam completed, 12/13/2018      Nutrition:  Current diet: Regular with servings of the following:    Medications:  Patient is not currently taking any over-the-counter supplements  Patient is able to manage medications  Lifestyle Choices:  Patient reports no tobacco use  Patient has not smoked or used tobacco in the past   Patient reports alcohol use  Alcohol use per week: 0-1  Patient drives a vehicle  Patient wears seat belt  Current level of exercise of physical activity described by patient as: No regular exercise  Activities of Daily Living:  Can get out of bed by his or her self, able to dress self, able to make own meals, able to do own shopping, able to bathe self, can do own laundry/housekeeping, can manage own money, pay bills and track expenses    Previous Hospitalizations:  No hospitalization or ED visit in past 12 months        Advanced Directives:  Patient has decided on a power of   Patient has spoken to designated power of   Patient has completed advanced directive          Preventative Screening/Counseling:      Cardiovascular:      General: Screening Current          Diabetes:      General: Screening Current          Colorectal Cancer:      General: Screening Not Indicated Breast Cancer:      General: Patient Declines          Cervical Cancer:      General: Screening Not Indicated          Osteoporosis:      General: Patient Declines          AAA:      General: Screening Not Indicated          Glaucoma:      General: Screening Current          HIV:      General: Screening Not Indicated          Hepatitis C:      General: Screening Not Indicated        Advanced Directives:   Patient has living will for healthcare, has durable POA for healthcare, patient has an advanced directive  Information on ACP and/or AD not provided  No 5 wishes given  End of life assessment reviewed with patient  Provider agrees with end of life decisions   Immunizations:      Influenza: Patient Declines      Pneumococcal: Patient Declines      Shingrix: Patient Declines             Visual Acuity Screening    Right eye Left eye Both eyes   Without correction:      With correction: 20/25 20/50 20/25       Physical Exam:  Review of Systems   Gastrointestinal: Negative for bowel incontinence  Psychiatric/Behavioral: The patient is not nervous/anxious  Vitals:    01/07/19 1033   BP: 144/82   Pulse: 66   Temp: (!) 97 2 °F (36 2 °C)   Weight: 70 kg (154 lb 6 4 oz)   Height: 5' 3" (1 6 m)   Body mass index is 27 35 kg/m²      Physical Exam

## 2019-03-12 DIAGNOSIS — F32.A DEPRESSION, UNSPECIFIED DEPRESSION TYPE: ICD-10-CM

## 2019-03-12 RX ORDER — DESVENLAFAXINE 50 MG/1
TABLET, EXTENDED RELEASE ORAL
Qty: 30 TABLET | Refills: 5 | Status: SHIPPED | OUTPATIENT
Start: 2019-03-12 | End: 2019-09-05 | Stop reason: SDUPTHER

## 2019-03-13 DIAGNOSIS — F41.9 ANXIETY: ICD-10-CM

## 2019-03-13 RX ORDER — ALPRAZOLAM 0.5 MG/1
TABLET ORAL
Qty: 30 TABLET | Refills: 3 | Status: SHIPPED | OUTPATIENT
Start: 2019-03-13 | End: 2019-07-11 | Stop reason: SDUPTHER

## 2019-03-21 DIAGNOSIS — E78.2 MIXED HYPERLIPIDEMIA: ICD-10-CM

## 2019-03-21 RX ORDER — ATORVASTATIN CALCIUM 20 MG/1
TABLET, FILM COATED ORAL
Qty: 90 TABLET | Refills: 0 | Status: SHIPPED | OUTPATIENT
Start: 2019-03-21 | End: 2019-06-13 | Stop reason: SDUPTHER

## 2019-06-13 DIAGNOSIS — E78.2 MIXED HYPERLIPIDEMIA: ICD-10-CM

## 2019-06-13 RX ORDER — ATORVASTATIN CALCIUM 20 MG/1
TABLET, FILM COATED ORAL
Qty: 90 TABLET | Refills: 3 | Status: SHIPPED | OUTPATIENT
Start: 2019-06-13 | End: 2020-05-29

## 2019-07-09 ENCOUNTER — OFFICE VISIT (OUTPATIENT)
Dept: FAMILY MEDICINE CLINIC | Facility: HOSPITAL | Age: 84
End: 2019-07-09
Payer: MEDICARE

## 2019-07-09 ENCOUNTER — APPOINTMENT (OUTPATIENT)
Dept: LAB | Facility: HOSPITAL | Age: 84
End: 2019-07-09
Payer: MEDICARE

## 2019-07-09 VITALS
TEMPERATURE: 98.1 F | HEART RATE: 73 BPM | SYSTOLIC BLOOD PRESSURE: 130 MMHG | DIASTOLIC BLOOD PRESSURE: 82 MMHG | HEIGHT: 63 IN | WEIGHT: 157 LBS | BODY MASS INDEX: 27.82 KG/M2

## 2019-07-09 DIAGNOSIS — E66.3 OVERWEIGHT (BMI 25.0-29.9): ICD-10-CM

## 2019-07-09 DIAGNOSIS — R73.9 HYPERGLYCEMIA: ICD-10-CM

## 2019-07-09 DIAGNOSIS — F41.8 DEPRESSION WITH ANXIETY: ICD-10-CM

## 2019-07-09 DIAGNOSIS — H81.10 BENIGN PAROXYSMAL POSITIONAL VERTIGO, UNSPECIFIED LATERALITY: ICD-10-CM

## 2019-07-09 DIAGNOSIS — F41.8 DEPRESSION WITH ANXIETY: Primary | ICD-10-CM

## 2019-07-09 DIAGNOSIS — E78.2 MIXED HYPERLIPIDEMIA: ICD-10-CM

## 2019-07-09 LAB
ALBUMIN SERPL BCP-MCNC: 3.8 G/DL (ref 3.5–5)
ALP SERPL-CCNC: 127 U/L (ref 46–116)
ALT SERPL W P-5'-P-CCNC: 32 U/L (ref 12–78)
ANION GAP SERPL CALCULATED.3IONS-SCNC: 11 MMOL/L (ref 4–13)
AST SERPL W P-5'-P-CCNC: 30 U/L (ref 5–45)
BASOPHILS # BLD AUTO: 0 THOUSANDS/ΜL (ref 0–0.1)
BASOPHILS NFR BLD AUTO: 0 % (ref 0–1)
BILIRUB SERPL-MCNC: 0.8 MG/DL (ref 0.2–1)
BUN SERPL-MCNC: 16 MG/DL (ref 5–25)
CALCIUM SERPL-MCNC: 8.9 MG/DL (ref 8.3–10.1)
CHLORIDE SERPL-SCNC: 103 MMOL/L (ref 100–108)
CHOLEST SERPL-MCNC: 217 MG/DL (ref 50–200)
CO2 SERPL-SCNC: 28 MMOL/L (ref 21–32)
CREAT SERPL-MCNC: 0.79 MG/DL (ref 0.6–1.3)
EOSINOPHIL # BLD AUTO: 0.04 THOUSAND/ΜL (ref 0–0.61)
EOSINOPHIL NFR BLD AUTO: 1 % (ref 0–6)
ERYTHROCYTE [DISTWIDTH] IN BLOOD BY AUTOMATED COUNT: 12.8 % (ref 11.6–15.1)
EST. AVERAGE GLUCOSE BLD GHB EST-MCNC: 126 MG/DL
GFR SERPL CREATININE-BSD FRML MDRD: 70 ML/MIN/1.73SQ M
GLUCOSE P FAST SERPL-MCNC: 93 MG/DL (ref 65–99)
HBA1C MFR BLD: 6 % (ref 4.2–6.3)
HCT VFR BLD AUTO: 45.3 % (ref 34.8–46.1)
HDLC SERPL-MCNC: 55 MG/DL (ref 40–60)
HGB BLD-MCNC: 14.7 G/DL (ref 11.5–15.4)
IMM GRANULOCYTES # BLD AUTO: 0.02 THOUSAND/UL (ref 0–0.2)
IMM GRANULOCYTES NFR BLD AUTO: 0 % (ref 0–2)
LDLC SERPL CALC-MCNC: 129 MG/DL (ref 0–100)
LYMPHOCYTES # BLD AUTO: 2.69 THOUSANDS/ΜL (ref 0.6–4.47)
LYMPHOCYTES NFR BLD AUTO: 45 % (ref 14–44)
MCH RBC QN AUTO: 29.4 PG (ref 26.8–34.3)
MCHC RBC AUTO-ENTMCNC: 32.5 G/DL (ref 31.4–37.4)
MCV RBC AUTO: 91 FL (ref 82–98)
MONOCYTES # BLD AUTO: 0.39 THOUSAND/ΜL (ref 0.17–1.22)
MONOCYTES NFR BLD AUTO: 7 % (ref 4–12)
NEUTROPHILS # BLD AUTO: 2.8 THOUSANDS/ΜL (ref 1.85–7.62)
NEUTS SEG NFR BLD AUTO: 47 % (ref 43–75)
NRBC BLD AUTO-RTO: 0 /100 WBCS
PLATELET # BLD AUTO: 287 THOUSANDS/UL (ref 149–390)
PMV BLD AUTO: 11.1 FL (ref 8.9–12.7)
POTASSIUM SERPL-SCNC: 3.7 MMOL/L (ref 3.5–5.3)
PROT SERPL-MCNC: 7.5 G/DL (ref 6.4–8.2)
RBC # BLD AUTO: 5 MILLION/UL (ref 3.81–5.12)
SODIUM SERPL-SCNC: 142 MMOL/L (ref 136–145)
TRIGL SERPL-MCNC: 166 MG/DL
WBC # BLD AUTO: 5.94 THOUSAND/UL (ref 4.31–10.16)

## 2019-07-09 PROCEDURE — 36415 COLL VENOUS BLD VENIPUNCTURE: CPT

## 2019-07-09 PROCEDURE — 99214 OFFICE O/P EST MOD 30 MIN: CPT | Performed by: FAMILY MEDICINE

## 2019-07-09 PROCEDURE — 85025 COMPLETE CBC W/AUTO DIFF WBC: CPT

## 2019-07-09 PROCEDURE — 80061 LIPID PANEL: CPT

## 2019-07-09 PROCEDURE — 83036 HEMOGLOBIN GLYCOSYLATED A1C: CPT

## 2019-07-09 PROCEDURE — 80053 COMPREHEN METABOLIC PANEL: CPT

## 2019-07-09 NOTE — PROGRESS NOTES
Assessment/Plan:         Diagnoses and all orders for this visit:    Depression with anxiety  -     CBC and differential; Future    Mixed hyperlipidemia  -     Lipid Panel with Direct LDL reflex; Future    Hyperglycemia  -     Comprehensive metabolic panel; Future  -     HEMOGLOBIN A1C W/ EAG ESTIMATION; Future    Overweight (BMI 25 0-29  9)    Benign paroxysmal positional vertigo, unspecified laterality          Subjective:      Patient ID: Cas Mchugh is a 82 y o  female  6 month follow up    Frustrated with weight gain, not really walking because of dizziness  Brother is on dialysis    Appetite is low  Bowels pretty regular  The following portions of the patient's history were reviewed and updated as appropriate: allergies, current medications, past family history, past medical history, past social history, past surgical history and problem list   BMI Counseling: Body mass index is 27 81 kg/m²  Discussed the patient's BMI with her  The BMI is above average  BMI counseling and education was provided to the patient  Nutrition recommendations include reducing portion sizes and moderation in carbohydrate intake  Exercise recommendations include vigorous aerobic physical activity for 75 minutes/week  Review of Systems   Constitutional: Negative for unexpected weight change  HENT: Negative  Respiratory: Negative  Cardiovascular: Negative  Gastrointestinal: Negative  Genitourinary: Negative  Musculoskeletal: Negative  Neurological: Positive for dizziness  Negative for light-headedness  Psychiatric/Behavioral: Positive for decreased concentration and dysphoric mood  The patient is nervous/anxious  All other systems reviewed and are negative  Objective:      /82   Pulse 73   Temp 98 1 °F (36 7 °C)   Ht 5' 3" (1 6 m)   Wt 71 2 kg (157 lb)   BMI 27 81 kg/m²          Physical Exam   Constitutional: She appears well-developed and well-nourished     Neck: Normal range of motion  Cardiovascular: Normal rate, regular rhythm, normal heart sounds and intact distal pulses  Pulmonary/Chest: Effort normal and breath sounds normal    Musculoskeletal: She exhibits no edema  Neurological: Coordination normal    Skin: No rash noted  Psychiatric: She has a normal mood and affect  Her behavior is normal  Judgment and thought content normal    Nursing note and vitals reviewed

## 2019-07-11 DIAGNOSIS — F41.9 ANXIETY: ICD-10-CM

## 2019-07-11 RX ORDER — ALPRAZOLAM 0.5 MG/1
TABLET ORAL
Qty: 30 TABLET | Refills: 3 | Status: SHIPPED | OUTPATIENT
Start: 2019-07-11 | End: 2019-11-18 | Stop reason: SDUPTHER

## 2019-09-05 DIAGNOSIS — F32.A DEPRESSION, UNSPECIFIED DEPRESSION TYPE: ICD-10-CM

## 2019-09-05 RX ORDER — DESVENLAFAXINE 50 MG/1
TABLET, EXTENDED RELEASE ORAL
Qty: 30 TABLET | Refills: 5 | Status: SHIPPED | OUTPATIENT
Start: 2019-09-05 | End: 2020-02-27

## 2019-11-05 ENCOUNTER — OFFICE VISIT (OUTPATIENT)
Dept: FAMILY MEDICINE CLINIC | Facility: HOSPITAL | Age: 84
End: 2019-11-05
Payer: COMMERCIAL

## 2019-11-05 VITALS
TEMPERATURE: 98.1 F | SYSTOLIC BLOOD PRESSURE: 110 MMHG | BODY MASS INDEX: 27.82 KG/M2 | HEIGHT: 63 IN | OXYGEN SATURATION: 98 % | DIASTOLIC BLOOD PRESSURE: 80 MMHG | HEART RATE: 77 BPM | WEIGHT: 157 LBS

## 2019-11-05 DIAGNOSIS — R31.9 HEMATURIA, UNSPECIFIED TYPE: Primary | ICD-10-CM

## 2019-11-05 DIAGNOSIS — R35.0 URINE FREQUENCY: ICD-10-CM

## 2019-11-05 LAB
SL AMB  POCT GLUCOSE, UA: ABNORMAL
SL AMB LEUKOCYTE ESTERASE,UA: ABNORMAL
SL AMB POCT BILIRUBIN,UA: ABNORMAL
SL AMB POCT BLOOD,UA: ABNORMAL
SL AMB POCT CLARITY,UA: ABNORMAL
SL AMB POCT COLOR,UA: YELLOW
SL AMB POCT KETONES,UA: ABNORMAL
SL AMB POCT NITRITE,UA: ABNORMAL
SL AMB POCT PH,UA: 6
SL AMB POCT SPECIFIC GRAVITY,UA: 1.02
SL AMB POCT URINE PROTEIN: ABNORMAL
SL AMB POCT UROBILINOGEN: 1

## 2019-11-05 PROCEDURE — 87077 CULTURE AEROBIC IDENTIFY: CPT | Performed by: NURSE PRACTITIONER

## 2019-11-05 PROCEDURE — 99213 OFFICE O/P EST LOW 20 MIN: CPT | Performed by: NURSE PRACTITIONER

## 2019-11-05 PROCEDURE — 81002 URINALYSIS NONAUTO W/O SCOPE: CPT | Performed by: NURSE PRACTITIONER

## 2019-11-05 PROCEDURE — 87186 SC STD MICRODIL/AGAR DIL: CPT | Performed by: NURSE PRACTITIONER

## 2019-11-05 PROCEDURE — 87086 URINE CULTURE/COLONY COUNT: CPT | Performed by: NURSE PRACTITIONER

## 2019-11-05 RX ORDER — CEPHALEXIN 500 MG/1
500 CAPSULE ORAL EVERY 8 HOURS SCHEDULED
Qty: 21 CAPSULE | Refills: 0 | Status: SHIPPED | OUTPATIENT
Start: 2019-11-05 | End: 2019-11-12

## 2019-11-05 NOTE — PROGRESS NOTES
Assessment/Plan:    Probable UTI given abnormal urine dip  Antibiotic rx issued and urine culture sent  Advise she increase water intake  If symptoms persist despite antibiotic, she will need f/u with urology  Diagnoses and all orders for this visit:    Hematuria, unspecified type  -     Urine culture  -     cephalexin (KEFLEX) 500 mg capsule; Take 1 capsule (500 mg total) by mouth every 8 (eight) hours for 7 days    Urine frequency  -     POCT urine dip  -     cephalexin (KEFLEX) 500 mg capsule; Take 1 capsule (500 mg total) by mouth every 8 (eight) hours for 7 days      Flu shot declined  Subjective:      Patient ID: Anu Jay is a 82 y o  female  For the past month has been having urinary symptoms  Feels as though she is not been emptying her bladder all the way  Occasionally burns with urination  Voids small amounts and feels like she has to go constantly  Has not been trying anything OTC  Never had UTIs before  The following portions of the patient's history were reviewed and updated as appropriate: allergies, current medications, past medical history, past social history, past surgical history and problem list     Review of Systems   Constitutional: Negative for activity change, appetite change, chills and fever  Respiratory: Negative for cough and shortness of breath  Gastrointestinal: Negative for abdominal distention, abdominal pain, constipation, diarrhea, nausea and vomiting  Genitourinary: Positive for decreased urine volume, dysuria, frequency and urgency  Negative for difficulty urinating, enuresis, flank pain, hematuria, pelvic pain, vaginal bleeding, vaginal discharge and vaginal pain  Psychiatric/Behavioral: Negative for sleep disturbance           Objective:      /80 (Patient Position: Sitting, Cuff Size: Standard)   Pulse 77   Temp 98 1 °F (36 7 °C) (Tympanic)   Ht 5' 2 5" (1 588 m)   Wt 71 2 kg (157 lb)   SpO2 98%   BMI 28 26 kg/m² Physical Exam   Constitutional: She is oriented to person, place, and time  She appears well-developed and well-nourished  No distress  HENT:   Head: Normocephalic and atraumatic  Cardiovascular: Normal rate, regular rhythm and normal heart sounds  Exam reveals no gallop and no friction rub  No murmur heard  Pulmonary/Chest: Effort normal and breath sounds normal  No stridor  No respiratory distress  She has no wheezes  She has no rales  Abdominal: Soft  Bowel sounds are normal  She exhibits no distension and no mass  There is no tenderness  There is no guarding and no CVA tenderness  Neurological: She is alert and oriented to person, place, and time  Skin: Skin is warm and dry  She is not diaphoretic  Psychiatric: She has a normal mood and affect  Her behavior is normal  Judgment and thought content normal    Vitals reviewed

## 2019-11-05 NOTE — PATIENT INSTRUCTIONS
Dysuria   AMBULATORY CARE:   Dysuria  is trouble urinating, or pain, burning, or discomfort when you urinate  Dysuria is usually a symptom of another problem, such as a blockage or urinary tract infection  Common symptoms include the following:   · Fever     · Cloudy, bad smelling urine     · Urge to urinate often but urinating little     · Back, side, or abdominal pain     · Blood in your urine     · Discharge that smells bad     · Itching  Seek care immediately if:   · You have severe back, side, or abdominal pain  · You have fever and shaking chills  · You vomit several times in a row  Contact your healthcare provider if:   · Your symptoms do not go away, even after treatment  · You have questions or concerns about your condition or care  Treatment for dysuria  may include medicines to treat a bacterial infection or help decrease bladder spasms  Manage your dysuria:   · Drink more liquids  Liquids help flush out bacteria that may be causing an infection  Ask your healthcare provider how much liquid to drink each day and which liquids are best for you  · Take sitz baths as directed  Fill a bathtub with 4 to 6 inches of warm water  You may also use a sitz bath pan that fits over a toilet  Sit in the sitz bath for 20 minutes  Do this 2 to 3 times a day, or as directed  The warm water can help decrease pain and swelling  Follow up with your healthcare provider as directed:  Write down your questions so you remember to ask them during your visits  © 2017 [address] [employer] Information is for End User's use only and may not be sold, redistributed or otherwise used for commercial purposes  All illustrations and images included in CareNotes® are the copyrighted property of [employer] , [employer]  or [employer]  The above information is an  only  It is not intended as medical advice for individual conditions or treatments   Talk to your doctor, nurse or pharmacist before following any medical regimen to see if it is safe and effective for you

## 2019-11-07 LAB — BACTERIA UR CULT: ABNORMAL

## 2019-11-18 DIAGNOSIS — F41.9 ANXIETY: ICD-10-CM

## 2019-11-18 RX ORDER — ALPRAZOLAM 0.5 MG/1
TABLET ORAL
Qty: 30 TABLET | Refills: 3 | Status: SHIPPED | OUTPATIENT
Start: 2019-11-18 | End: 2020-03-17

## 2020-02-17 ENCOUNTER — OFFICE VISIT (OUTPATIENT)
Dept: FAMILY MEDICINE CLINIC | Facility: HOSPITAL | Age: 85
End: 2020-02-17
Payer: COMMERCIAL

## 2020-02-17 ENCOUNTER — APPOINTMENT (OUTPATIENT)
Dept: LAB | Facility: HOSPITAL | Age: 85
End: 2020-02-17
Payer: COMMERCIAL

## 2020-02-17 VITALS
TEMPERATURE: 98.2 F | WEIGHT: 154 LBS | DIASTOLIC BLOOD PRESSURE: 78 MMHG | HEART RATE: 60 BPM | HEIGHT: 63 IN | SYSTOLIC BLOOD PRESSURE: 138 MMHG | BODY MASS INDEX: 27.29 KG/M2

## 2020-02-17 DIAGNOSIS — R73.9 HYPERGLYCEMIA: ICD-10-CM

## 2020-02-17 DIAGNOSIS — Z00.00 MEDICARE ANNUAL WELLNESS VISIT, SUBSEQUENT: Primary | ICD-10-CM

## 2020-02-17 DIAGNOSIS — E78.2 MIXED HYPERLIPIDEMIA: ICD-10-CM

## 2020-02-17 DIAGNOSIS — F32.0 CURRENT MILD EPISODE OF MAJOR DEPRESSIVE DISORDER WITHOUT PRIOR EPISODE (HCC): ICD-10-CM

## 2020-02-17 LAB
ALBUMIN SERPL BCP-MCNC: 3.7 G/DL (ref 3.5–5)
ALP SERPL-CCNC: 140 U/L (ref 46–116)
ALT SERPL W P-5'-P-CCNC: 28 U/L (ref 12–78)
ANION GAP SERPL CALCULATED.3IONS-SCNC: 4 MMOL/L (ref 4–13)
AST SERPL W P-5'-P-CCNC: 26 U/L (ref 5–45)
BASOPHILS # BLD AUTO: 0.01 THOUSANDS/ΜL (ref 0–0.1)
BASOPHILS NFR BLD AUTO: 0 % (ref 0–1)
BILIRUB SERPL-MCNC: 0.77 MG/DL (ref 0.2–1)
BUN SERPL-MCNC: 15 MG/DL (ref 5–25)
CALCIUM SERPL-MCNC: 9.2 MG/DL (ref 8.3–10.1)
CHLORIDE SERPL-SCNC: 108 MMOL/L (ref 100–108)
CHOLEST SERPL-MCNC: 196 MG/DL (ref 50–200)
CO2 SERPL-SCNC: 29 MMOL/L (ref 21–32)
CREAT SERPL-MCNC: 0.79 MG/DL (ref 0.6–1.3)
EOSINOPHIL # BLD AUTO: 0.04 THOUSAND/ΜL (ref 0–0.61)
EOSINOPHIL NFR BLD AUTO: 1 % (ref 0–6)
ERYTHROCYTE [DISTWIDTH] IN BLOOD BY AUTOMATED COUNT: 12.6 % (ref 11.6–15.1)
EST. AVERAGE GLUCOSE BLD GHB EST-MCNC: 123 MG/DL
GFR SERPL CREATININE-BSD FRML MDRD: 69 ML/MIN/1.73SQ M
GLUCOSE P FAST SERPL-MCNC: 95 MG/DL (ref 65–99)
HBA1C MFR BLD: 5.9 %
HCT VFR BLD AUTO: 46.5 % (ref 34.8–46.1)
HDLC SERPL-MCNC: 54 MG/DL
HGB BLD-MCNC: 14.8 G/DL (ref 11.5–15.4)
IMM GRANULOCYTES # BLD AUTO: 0.01 THOUSAND/UL (ref 0–0.2)
IMM GRANULOCYTES NFR BLD AUTO: 0 % (ref 0–2)
LDLC SERPL CALC-MCNC: 112 MG/DL (ref 0–100)
LYMPHOCYTES # BLD AUTO: 2.79 THOUSANDS/ΜL (ref 0.6–4.47)
LYMPHOCYTES NFR BLD AUTO: 49 % (ref 14–44)
MCH RBC QN AUTO: 29 PG (ref 26.8–34.3)
MCHC RBC AUTO-ENTMCNC: 31.8 G/DL (ref 31.4–37.4)
MCV RBC AUTO: 91 FL (ref 82–98)
MONOCYTES # BLD AUTO: 0.42 THOUSAND/ΜL (ref 0.17–1.22)
MONOCYTES NFR BLD AUTO: 7 % (ref 4–12)
NEUTROPHILS # BLD AUTO: 2.45 THOUSANDS/ΜL (ref 1.85–7.62)
NEUTS SEG NFR BLD AUTO: 43 % (ref 43–75)
NRBC BLD AUTO-RTO: 0 /100 WBCS
PLATELET # BLD AUTO: 280 THOUSANDS/UL (ref 149–390)
PMV BLD AUTO: 10.5 FL (ref 8.9–12.7)
POTASSIUM SERPL-SCNC: 4 MMOL/L (ref 3.5–5.3)
PROT SERPL-MCNC: 7 G/DL (ref 6.4–8.2)
RBC # BLD AUTO: 5.11 MILLION/UL (ref 3.81–5.12)
SODIUM SERPL-SCNC: 141 MMOL/L (ref 136–145)
TRIGL SERPL-MCNC: 150 MG/DL
TSH SERPL DL<=0.05 MIU/L-ACNC: 1.62 UIU/ML (ref 0.36–3.74)
WBC # BLD AUTO: 5.72 THOUSAND/UL (ref 4.31–10.16)

## 2020-02-17 PROCEDURE — 80061 LIPID PANEL: CPT

## 2020-02-17 PROCEDURE — 1160F RVW MEDS BY RX/DR IN RCRD: CPT | Performed by: FAMILY MEDICINE

## 2020-02-17 PROCEDURE — 80053 COMPREHEN METABOLIC PANEL: CPT

## 2020-02-17 PROCEDURE — 1036F TOBACCO NON-USER: CPT | Performed by: FAMILY MEDICINE

## 2020-02-17 PROCEDURE — G0439 PPPS, SUBSEQ VISIT: HCPCS | Performed by: FAMILY MEDICINE

## 2020-02-17 PROCEDURE — 85025 COMPLETE CBC W/AUTO DIFF WBC: CPT

## 2020-02-17 PROCEDURE — 83036 HEMOGLOBIN GLYCOSYLATED A1C: CPT

## 2020-02-17 PROCEDURE — 36415 COLL VENOUS BLD VENIPUNCTURE: CPT

## 2020-02-17 PROCEDURE — 99213 OFFICE O/P EST LOW 20 MIN: CPT | Performed by: FAMILY MEDICINE

## 2020-02-17 PROCEDURE — 1170F FXNL STATUS ASSESSED: CPT | Performed by: FAMILY MEDICINE

## 2020-02-17 PROCEDURE — 3008F BODY MASS INDEX DOCD: CPT | Performed by: FAMILY MEDICINE

## 2020-02-17 PROCEDURE — 84443 ASSAY THYROID STIM HORMONE: CPT

## 2020-02-17 PROCEDURE — 1125F AMNT PAIN NOTED PAIN PRSNT: CPT | Performed by: FAMILY MEDICINE

## 2020-02-17 NOTE — PROGRESS NOTES
Assessment and Plan:     Problem List Items Addressed This Visit        Other    Mixed hyperlipidemia    Relevant Orders    CBC and differential    Lipid Panel with Direct LDL reflex    TSH, 3rd generation with Free T4 reflex    Hyperglycemia    Relevant Orders    Comprehensive metabolic panel    HEMOGLOBIN A1C W/ EAG ESTIMATION    Medicare annual wellness visit, subsequent - Primary    Current mild episode of major depressive disorder without prior episode ([address] )        BMI Counseling: Body mass index is 27 5 kg/m²  The BMI is above normal  Nutrition recommendations include decreasing portion sizes, moderation in carbohydrate intake and reducing intake of cholesterol  Exercise recommendations include exercising 3-5 times per week  No pharmacotherapy was ordered  Preventive health issues were discussed with patient, and age appropriate screening tests were ordered as noted in patient's After Visit Summary  Personalized health advice and appropriate referrals for health education or preventive services given if needed, as noted in patient's After Visit Summary  History of Present Illness:     Patient presents for Medicare Annual Wellness visit    Patient Care Team:  Umu Martinez MD as PCP - General  Umu Martinez MD     Problem List:     Patient Active Problem List   Diagnosis    Anxiety disorder    Benign paroxysmal positional vertigo    Depression with anxiety    Esophagitis, reflux    Glaucoma    Mixed hyperlipidemia    Osteopenia    Pain of right heel    Hyperglycemia    Cervical disc disease    Medicare annual wellness visit, subsequent    Overweight (BMI 25 0-29  9)    Current mild episode of major depressive disorder without prior episode [facility])      Past Medical and Surgical History:     Past Medical History:   Diagnosis Date    Anxiety     Depression     Glaucoma     Hyperlipemia      Past Surgical History:   Procedure Laterality Date    APPENDECTOMY      HYSTERECTOMY Family History:     Family History   Problem Relation Age of Onset   Ivy Rodriges Hypertension Mother     Depression Mother     Stroke Father     Alcohol abuse Son     Substance Abuse Son       Social History:        Social History     Socioeconomic History    Marital status:      Spouse name: None    Number of children: None    Years of education: None    Highest education level: None   Occupational History    None   Social Needs    Financial resource strain: None    Food insecurity:     Worry: None     Inability: None    Transportation needs:     Medical: None     Non-medical: None   Tobacco Use    Smoking status: Never Smoker    Smokeless tobacco: Never Used   Substance and Sexual Activity    Alcohol use: Yes     Comment: Rarely    Drug use: No    Sexual activity: None   Lifestyle    Physical activity:     Days per week: None     Minutes per session: None    Stress: None   Relationships    Social connections:     Talks on phone: None     Gets together: None     Attends Congregation service: None     Active member of club or organization: None     Attends meetings of clubs or organizations: None     Relationship status: None    Intimate partner violence:     Fear of current or ex partner: None     Emotionally abused: None     Physically abused: None     Forced sexual activity: None   Other Topics Concern    None   Social History Narrative    Always uses seat belt       Medications and Allergies:     Current Outpatient Medications   Medication Sig Dispense Refill    ALPRAZolam (XANAX) 0 5 mg tablet TAKE 1 TABLET BY MOUTH EVERY 4-6 HOURS AS NEEDED 30 tablet 3    aspirin (ECOTRIN LOW STRENGTH) 81 mg EC tablet Take by mouth      atorvastatin (LIPITOR) 20 mg tablet TAKE 1 TABLET BY MOUTH EVERY DAY 90 tablet 3    desvenlafaxine succinate (PRISTIQ) 50 mg 24 hr tablet TAKE 1 TABLET DAILY 30 tablet 5    MULTIPLE VITAMINS-CALCIUM PO Take 1 tablet by mouth daily      Omega-3 Fatty Acids (FISH OIL) 645 MG CAPS Take by mouth      TRAVATAN Z 0 004 % ophthalmic solution Administer 1 drop to both eyes daily at bedtime  5     No current facility-administered medications for this visit  No Known Allergies   Immunizations: There is no immunization history for the selected administration types on file for this patient  Health Maintenance:         Topic Date Due    DXA SCAN  08/17/2020 (Originally 1935)     There are no preventive care reminders to display for this patient  Medicare Health Risk Assessment:     /78   Pulse 60   Temp 98 2 °F (36 8 °C)   Ht 5' 2 75" (1 594 m)   Wt 69 9 kg (154 lb)   BMI 27 50 kg/m²      Nick Reardon is here for her Subsequent Wellness visit  Last Medicare Wellness visit information reviewed, patient interviewed and updates made to the record today  Health Risk Assessment:   Patient rates overall health as very good  Patient feels that their physical health rating is same  Eyesight was rated as slightly worse  Hearing was rated as same  Patient feels that their emotional and mental health rating is same  Pain experienced in the last 7 days has been none  Patient states that she has experienced no weight loss or gain in last 6 months  Depression Screening:   PHQ-2 Score: 0  PHQ-9 Score: 3      Fall Risk Screening: In the past year, patient has experienced: no history of falling in past year      Urinary Incontinence Screening:   Patient has not leaked urine accidently in the last six months  Home Safety:  Patient does not have trouble with stairs inside or outside of their home  Patient has working smoke alarms and has working carbon monoxide detector  Home safety hazards include: none  Nutrition:   Current diet is Regular  Medications:   Patient is currently taking over-the-counter supplements   OTC medications include: see medication list      Activities of Daily Living (ADLs)/Instrumental Activities of Daily Living (IADLs):   Walk and transfer into and out of bed and chair?: Yes  Dress and groom yourself?: Yes    Bathe or shower yourself?: Yes    Feed yourself? Yes  Do your laundry/housekeeping?: Yes  Manage your money, pay your bills and track your expenses?: Yes  Make your own meals?: Yes    Do your own shopping?: Yes    Previous Hospitalizations:   Any hospitalizations or ED visits within the last 12 months?: No      Advance Care Planning:   Living will: Yes    Durable POA for healthcare:  Yes    Advanced directive: Yes    Advanced directive counseling given: No    Five wishes given: No    Patient declined ACP directive: No    End of Life Decisions reviewed with patient: Yes    Provider agrees with end of life decisions: Yes      Cognitive Screening:   Provider or family/friend/caregiver concerned regarding cognition?: No    PREVENTIVE SCREENINGS      Cardiovascular Screening:    General: Screening Not Indicated and History Lipid Disorder      Diabetes Screening:     General: Screening Current      Colorectal Cancer Screening:     General: Screening Not Indicated      Breast Cancer Screening:     General: Screening Current      Cervical Cancer Screening:    General: Screening Not Indicated      Osteoporosis Screening:    General: Screening Current      Abdominal Aortic Aneurysm (AAA) Screening:        General: Screening Not Indicated      Lung Cancer Screening:     General: Screening Not Indicated      Hepatitis C Screening:    General: Screening Not Indicated      Nasra Terry MD

## 2020-02-17 NOTE — PATIENT INSTRUCTIONS
Medicare Preventive Visit Patient Instructions  Thank you for completing your Welcome to Medicare Visit or Medicare Annual Wellness Visit today  Your next wellness visit will be due in one year (2/17/2021)  The screening/preventive services that you may require over the next 5-10 years are detailed below  Some tests may not apply to you based off risk factors and/or age  Screening tests ordered at today's visit but not completed yet may show as past due  Also, please note that scanned in results may not display below  Preventive Screenings:  Service Recommendations Previous Testing/Comments   Colorectal Cancer Screening  * Colonoscopy    * Fecal Occult Blood Test (FOBT)/Fecal Immunochemical Test (FIT)  * Fecal DNA/Cologuard Test  * Flexible Sigmoidoscopy Age: 54-65 years old   Colonoscopy: every 10 years (may be performed more frequently if at higher risk)  OR  FOBT/FIT: every 1 year  OR  Cologuard: every 3 years  OR  Sigmoidoscopy: every 5 years  Screening may be recommended earlier than age 48 if at higher risk for colorectal cancer  Also, an individualized decision between you and your healthcare provider will decide whether screening between the ages of 74-80 would be appropriate  Colonoscopy: Not on file  FOBT/FIT: Not on file  Cologuard: Not on file  Sigmoidoscopy: Not on file         Breast Cancer Screening Age: 36 years old  Frequency: every 1-2 years  Not required if history of left and right mastectomy Mammogram: 06/29/2018    Screening Current   Cervical Cancer Screening Between the ages of 21-29, pap smear recommended once every 3 years  Between the ages of 33-67, can perform pap smear with HPV co-testing every 5 years     Recommendations may differ for women with a history of total hysterectomy, cervical cancer, or abnormal pap smears in past  Pap Smear: Not on file    Screening Not Indicated   Hepatitis C Screening Once for adults born between 1945 and 1965  More frequently in patients at high risk for Hepatitis C Hep C Antibody: Not on file       Diabetes Screening 1-2 times per year if you're at risk for diabetes or have pre-diabetes Fasting glucose: 93 mg/dL   A1C: 6 0 %    Screening Current   Cholesterol Screening Once every 5 years if you don't have a lipid disorder  May order more often based on risk factors  Lipid panel: 07/09/2019    Screening Not Indicated  History Lipid Disorder     Other Preventive Screenings Covered by Medicare:  1  Abdominal Aortic Aneurysm (AAA) Screening: covered once if your at risk  You're considered to be at risk if you have a family history of AAA  2  Lung Cancer Screening: covers low dose CT scan once per year if you meet all of the following conditions: (1) Age 50-69; (2) No signs or symptoms of lung cancer; (3) Current smoker or have quit smoking within the last 15 years; (4) You have a tobacco smoking history of at least 30 pack years (packs per day multiplied by number of years you smoked); (5) You get a written order from a healthcare provider  3  Glaucoma Screening: covered annually if you're considered high risk: (1) You have diabetes OR (2) Family history of glaucoma OR (3)  aged 48 and older OR (3)  American aged 72 and older  3  Osteoporosis Screening: covered every 2 years if you meet one of the following conditions: (1) You're estrogen deficient and at risk for osteoporosis based off medical history and other findings; (2) Have a vertebral abnormality; (3) On glucocorticoid therapy for more than 3 months; (4) Have primary hyperparathyroidism; (5) On osteoporosis medications and need to assess response to drug therapy  · Last bone density test (DXA Scan): Not on file  5  HIV Screening: covered annually if you're between the age of 12-76  Also covered annually if you are younger than 13 and older than 72 with risk factors for HIV infection   For pregnant patients, it is covered up to 3 times per pregnancy  Immunizations:  Immunization Recommendations   Influenza Vaccine Annual influenza vaccination during flu season is recommended for all persons aged >= 6 months who do not have contraindications   Pneumococcal Vaccine (Prevnar and Pneumovax)  * Prevnar = PCV13  * Pneumovax = PPSV23   Adults 25-60 years old: 1-3 doses may be recommended based on certain risk factors  Adults 72 years old: Prevnar (PCV13) vaccine recommended followed by Pneumovax (PPSV23) vaccine  If already received PPSV23 since turning 65, then PCV13 recommended at least one year after PPSV23 dose  Hepatitis B Vaccine 3 dose series if at intermediate or high risk (ex: diabetes, end stage renal disease, liver disease)   Tetanus (Td) Vaccine - COST NOT COVERED BY MEDICARE PART B Following completion of primary series, a booster dose should be given every 10 years to maintain immunity against tetanus  Td may also be given as tetanus wound prophylaxis  Tdap Vaccine - COST NOT COVERED BY MEDICARE PART B Recommended at least once for all adults  For pregnant patients, recommended with each pregnancy  Shingles Vaccine (Shingrix) - COST NOT COVERED BY MEDICARE PART B  2 shot series recommended in those aged 48 and above     Health Maintenance Due:      Topic Date Due    DXA SCAN  08/17/2020 (Originally 1935)     Immunizations Due:  There are no preventive care reminders to display for this patient  Advance Directives   What are advance directives? Advance directives are legal documents that state your wishes and plans for medical care  These plans are made ahead of time in case you lose your ability to make decisions for yourself  Advance directives can apply to any medical decision, such as the treatments you want, and if you want to donate organs  What are the types of advance directives? There are many types of advance directives, and each state has rules about how to use them   You may choose a combination of any of the following:  · Living will: This is a written record of the treatment you want  You can also choose which treatments you do not want, which to limit, and which to stop at a certain time  This includes surgery, medicine, IV fluid, and tube feedings  · Durable power of  for healthcare [facility]): This is a written record that states who you want to make healthcare choices for you when you are unable to make them for yourself  This person, called a proxy, is usually a family member or a friend  You may choose more than 1 proxy  · Do not resuscitate (DNR) order:  A DNR order is used in case your heart stops beating or you stop breathing  It is a request not to have certain forms of treatment, such as CPR  A DNR order may be included in other types of advance directives  · Medical directive: This covers the care that you want if you are in a coma, near death, or unable to make decisions for yourself  You can list the treatments you want for each condition  Treatment may include pain medicine, surgery, blood transfusions, dialysis, IV or tube feedings, and a ventilator (breathing machine)  · Values history: This document has questions about your views, beliefs, and how you feel and think about life  This information can help others choose the care that you would choose  Why are advance directives important? An advance directive helps you control your care  Although spoken wishes may be used, it is better to have your wishes written down  Spoken wishes can be misunderstood, or not followed  Treatments may be given even if you do not want them  An advance directive may make it easier for your family to make difficult choices about your care  Weight Management   Why it is important to manage your weight:  Being overweight increases your risk of health conditions such as heart disease, high blood pressure, type 2 diabetes, and certain types of cancer   It can also increase your risk for osteoarthritis, sleep apnea, and other respiratory problems  Aim for a slow, steady weight loss  Even a small amount of weight loss can lower your risk of health problems  How to lose weight safely:  A safe and healthy way to lose weight is to eat fewer calories and get regular exercise  You can lose up about 1 pound a week by decreasing the number of calories you eat by 500 calories each day  Healthy meal plan for weight management:  A healthy meal plan includes a variety of foods, contains fewer calories, and helps you stay healthy  A healthy meal plan includes the following:  · Eat whole-grain foods more often  A healthy meal plan should contain fiber  Fiber is the part of grains, fruits, and vegetables that is not broken down by your body  Whole-grain foods are healthy and provide extra fiber in your diet  Some examples of whole-grain foods are whole-wheat breads and pastas, oatmeal, brown rice, and bulgur  · Eat a variety of vegetables every day  Include dark, leafy greens such as spinach, kale, fadi greens, and mustard greens  Eat yellow and orange vegetables such as carrots, sweet potatoes, and winter squash  · Eat a variety of fruits every day  Choose fresh or canned fruit (canned in its own juice or light syrup) instead of juice  Fruit juice has very little or no fiber  · Eat low-fat dairy foods  Drink fat-free (skim) milk or 1% milk  Eat fat-free yogurt and low-fat cottage cheese  Try low-fat cheeses such as mozzarella and other reduced-fat cheeses  · Choose meat and other protein foods that are low in fat  Choose beans or other legumes such as split peas or lentils  Choose fish, skinless poultry (chicken or turkey), or lean cuts of red meat (beef or pork)  Before you cook meat or poultry, cut off any visible fat  · Use less fat and oil  Try baking foods instead of frying them  Add less fat, such as margarine, sour cream, regular salad dressing and mayonnaise to foods  Eat fewer high-fat foods   Some examples of high-fat foods include french fries, doughnuts, ice cream, and cakes  · Eat fewer sweets  Limit foods and drinks that are high in sugar  This includes candy, cookies, regular soda, and sweetened drinks  Exercise:  Exercise at least 30 minutes per day on most days of the week  Some examples of exercise include walking, biking, dancing, and swimming  You can also fit in more physical activity by taking the stairs instead of the elevator or parking farther away from stores  Ask your healthcare provider about the best exercise plan for you  © Copyright [employer] 2018 Information is for End User's use only and may not be sold, redistributed or otherwise used for commercial purposes  All illustrations and images included in CareNotes® are the copyrighted property of A D A M , Inc  or [employer] Visit for Adults   AMBULATORY CARE:   A wellness visit  is when you see your healthcare provider to get screened for health problems  You can also get advice on how to stay healthy  Write down your questions so you remember to ask them  Ask your healthcare provider how often you should have a wellness visit  What happens at a wellness visit:  Your healthcare provider will ask about your health, and your family history of health problems  This includes high blood pressure, heart disease, and cancer  He or she will ask if you have symptoms that concern you, if you smoke, and about your mood  You may also be asked about your intake of medicines, supplements, food, and alcohol  Any of the following may be done:  · Your weight  will be checked  Your height may also be checked so your body mass index (BMI) can be calculated  Your BMI shows if you are at a healthy weight  · Your blood pressure  and heart rate will be checked  Your temperature may also be checked  · Blood and urine tests  may be done  Blood tests may be done to check your cholesterol levels   Abnormal cholesterol levels increase your risk for heart disease and stroke  You may also need a blood or urine test to check for diabetes if you are at increased risk  Urine tests may be done to look for signs of an infection or kidney disease  · A physical exam  includes checking your heartbeat and lungs with a stethoscope  Your healthcare provider may also check your skin to look for sun damage  · Screening tests  may be recommended  A screening test is done to check for diseases that may not cause symptoms  The screening tests you may need depend on your age, gender, family history, and lifestyle habits  For example, colorectal screening may be recommended if you are 48years old or older  Screening tests you need if you are a woman:   · A Pap smear  is used to screen for cervical cancer  Pap smears are usually done every 3 to 5 years depending on your age  You may need them more often if you have had abnormal Pap smear test results in the past  Ask your healthcare provider how often you should have a Pap smear  · A mammogram  is an x-ray of your breasts to screen for breast cancer  Experts recommend mammograms every 2 years starting at age 48 years  You may need a mammogram at age 52 years or younger if you have an increased risk for breast cancer  Talk to your healthcare provider about when you should start having mammograms and how often you need them  Vaccines you may need:   · Get an influenza vaccine  every year  The influenza vaccine protects you from the flu  Several types of viruses cause the flu  The viruses change over time, so new vaccines are made each year  · Get a tetanus-diphtheria (Td) booster vaccine  every 10 years  This vaccine protects you against tetanus and diphtheria  Tetanus is a severe infection that may cause painful muscle spasms and lockjaw  Diphtheria is a severe bacterial infection that causes a thick covering in the back of your mouth and throat       · Get a human papillomavirus (HPV) vaccine  if you are female and aged 23 to 32 or male 23 to 24 and never received it  This vaccine protects you from HPV infection  HPV is the most common infection spread by sexual contact  HPV may also cause vaginal, penile, and anal cancers  · Get a pneumococcal vaccine  if you are aged 72 years or older  The pneumococcal vaccine is an injection given to protect you from pneumococcal disease  Pneumococcal disease is an infection caused by pneumococcal bacteria  The infection may cause pneumonia, meningitis, or an ear infection  · Get a shingles vaccine  if you are aged 61 or older, even if you have had shingles before  The shingles vaccine is an injection to protect you from the varicella-zoster virus  This is the same virus that causes chickenpox  Shingles is a painful rash that develops in people who had chickenpox or have been exposed to the virus  How to eat healthy:  My Plate is a model for planning healthy meals  It shows the types and amounts of foods that should go on your plate  Fruits and vegetables make up about half of your plate, and grains and protein make up the other half  A serving of dairy is included on the side of your plate  The amount of calories and serving sizes you need depends on your age, gender, weight, and height  Examples of healthy foods are listed below:  · Eat a variety of vegetables  such as dark green, red, and orange vegetables  You can also include canned vegetables low in sodium (salt) and frozen vegetables without added butter or sauces  · Eat a variety of fresh fruits , canned fruit in 100% juice, frozen fruit, and dried fruit  · Include whole grains  At least half of the grains you eat should be whole grains  Examples include whole-wheat bread, wheat pasta, brown rice, and whole-grain cereals such as oatmeal     · Eat a variety of protein foods such as seafood (fish and shellfish), lean meat, and poultry without skin (turkey and chicken)   Examples of lean meats include pork leg, shoulder, or tenderloin, and beef round, sirloin, tenderloin, and extra lean ground beef  Other protein foods include eggs and egg substitutes, beans, peas, soy products, nuts, and seeds  · Choose low-fat dairy products such as skim or 1% milk or low-fat yogurt, cheese, and cottage cheese  · Limit unhealthy fats  such as butter, hard margarine, and shortening  Exercise:  Exercise at least 30 minutes per day on most days of the week  Some examples of exercise include walking, biking, dancing, and swimming  You can also fit in more physical activity by taking the stairs instead of the elevator or parking farther away from stores  Include muscle strengthening activities 2 days each week  Regular exercise provides many health benefits  It helps you manage your weight, and decreases your risk for type 2 diabetes, heart disease, stroke, and high blood pressure  Exercise can also help improve your mood  Ask your healthcare provider about the best exercise plan for you  General health and safety guidelines:   · Do not smoke  Nicotine and other chemicals in cigarettes and cigars can cause lung damage  Ask your healthcare provider for information if you currently smoke and need help to quit  E-cigarettes or smokeless tobacco still contain nicotine  Talk to your healthcare provider before you use these products  · Limit alcohol  A drink of alcohol is 12 ounces of beer, 5 ounces of wine, or 1½ ounces of liquor  · Lose weight, if needed  Being overweight increases your risk of certain health conditions  These include heart disease, high blood pressure, type 2 diabetes, and certain types of cancer  · Protect your skin  Do not sunbathe or use tanning beds  Use sunscreen with a SPF 15 or higher  Apply sunscreen at least 15 minutes before you go outside  Reapply sunscreen every 2 hours  Wear protective clothing, hats, and sunglasses when you are outside  · Drive safely    Always wear your seatbelt  Make sure everyone in your car wears a seatbelt  A seatbelt can save your life if you are in an accident  Do not use your cell phone when you are driving  This could distract you and cause an accident  Pull over if you need to make a call or send a text message  · Practice safe sex  Use latex condoms if are sexually active and have more than one partner  Your healthcare provider may recommend screening tests for sexually transmitted infections (STIs)  · Wear helmets, lifejackets, and protective gear  Always wear a helmet when you ride a bike or motorcycle, go skiing, or play sports that could cause a head injury  Wear protective equipment when you play sports  Wear a lifejacket when you are on a boat or doing water sports  © 2017 [facility] Information is for End User's use only and may not be sold, redistributed or otherwise used for commercial purposes  All illustrations and images included in CareNotes® are the copyrighted property of A [employer] A M , Inc  or [employer]  The above information is an  only  It is not intended as medical advice for individual conditions or treatments  Talk to your doctor, nurse or pharmacist before following any medical regimen to see if it is safe and effective for you

## 2020-02-17 NOTE — PROGRESS NOTES
Assessment/Plan:         Diagnoses and all orders for this visit:    Medicare annual wellness visit, subsequent    Hyperglycemia  -     Comprehensive metabolic panel; Future  -     HEMOGLOBIN A1C W/ EAG ESTIMATION; Future    Mixed hyperlipidemia  -     CBC and differential; Future  -     Lipid Panel with Direct LDL reflex; Future  -     TSH, 3rd generation with Free T4 reflex; Future    Current mild episode of major depressive disorder without prior episode ([facility] [address] )  Comments:  Good stability of mood on Pristiq  Advised she try volunteering, at [facility] [employer] [facility]? Subjective:      Patient ID: Sylvia Castanon is a 82 y o  female  6 month follow up  Has depression issues but feels she has to keep going for her son who is cared [facility] at the [facility], weighs under 100lbs    No recent illness or injury  We discussed things like volunteering at the animal shelter      The following portions of the patient's history were reviewed and updated as appropriate: allergies, current medications, past family history, past medical history, past social history, past surgical history and problem list     Review of Systems   Constitutional: Negative for unexpected weight change  HENT: Negative  Respiratory: Negative  Cardiovascular: Negative  Gastrointestinal: Negative  Genitourinary: Negative  Musculoskeletal: Negative  Neurological: Negative  Hematological: Negative  Psychiatric/Behavioral: The patient is nervous/anxious  All other systems reviewed and are negative  Objective:      /78   Pulse 60   Temp 98 2 °F (36 8 °C)   Ht 5' 2 75" (1 594 m)   Wt 69 9 kg (154 lb)   BMI 27 50 kg/m²          Physical Exam   Constitutional: She is oriented to person, place, and time  She appears well-developed and well-nourished  Neck: No thyromegaly present  Cardiovascular: Normal rate, regular rhythm, normal heart sounds and intact distal pulses     Pulmonary/Chest: Effort normal and breath sounds normal    Neurological: She is alert and oriented to person, place, and time  Psychiatric: She has a normal mood and affect  Her behavior is normal  Judgment and thought content normal    Nursing note and vitals reviewed  BMI Counseling: Body mass index is 27 5 kg/m²  The BMI is above normal  Nutrition recommendations include reducing portion sizes, decreasing overall calorie intake and moderation in carbohydrate intake  Exercise recommendations include exercising 3-5 times per week

## 2020-02-27 DIAGNOSIS — F32.A DEPRESSION, UNSPECIFIED DEPRESSION TYPE: ICD-10-CM

## 2020-02-27 RX ORDER — DESVENLAFAXINE 50 MG/1
TABLET, EXTENDED RELEASE ORAL
Qty: 90 TABLET | Refills: 1 | Status: SHIPPED | OUTPATIENT
Start: 2020-02-27 | End: 2020-08-20

## 2020-03-17 DIAGNOSIS — F41.9 ANXIETY: ICD-10-CM

## 2020-03-17 RX ORDER — ALPRAZOLAM 0.5 MG/1
TABLET ORAL
Qty: 30 TABLET | Refills: 3 | Status: SHIPPED | OUTPATIENT
Start: 2020-03-17 | End: 2020-07-23 | Stop reason: SDUPTHER

## 2020-05-29 DIAGNOSIS — E78.2 MIXED HYPERLIPIDEMIA: ICD-10-CM

## 2020-05-29 RX ORDER — ATORVASTATIN CALCIUM 20 MG/1
TABLET, FILM COATED ORAL
Qty: 90 TABLET | Refills: 3 | Status: SHIPPED | OUTPATIENT
Start: 2020-05-29 | End: 2021-05-18

## 2020-07-23 DIAGNOSIS — F41.9 ANXIETY: ICD-10-CM

## 2020-07-24 RX ORDER — ALPRAZOLAM 0.5 MG/1
TABLET ORAL
Qty: 30 TABLET | Refills: 3 | Status: SHIPPED | OUTPATIENT
Start: 2020-07-24 | End: 2020-09-28 | Stop reason: SDUPTHER

## 2020-08-20 DIAGNOSIS — F32.A DEPRESSION, UNSPECIFIED DEPRESSION TYPE: ICD-10-CM

## 2020-08-20 RX ORDER — DESVENLAFAXINE 50 MG/1
TABLET, EXTENDED RELEASE ORAL
Qty: 90 TABLET | Refills: 1 | Status: SHIPPED | OUTPATIENT
Start: 2020-08-20 | End: 2021-02-19

## 2020-09-28 DIAGNOSIS — F41.9 ANXIETY: ICD-10-CM

## 2020-09-29 RX ORDER — ALPRAZOLAM 0.5 MG/1
TABLET ORAL
Qty: 30 TABLET | Refills: 3 | Status: SHIPPED | OUTPATIENT
Start: 2020-09-29 | End: 2020-12-28 | Stop reason: SDUPTHER

## 2020-12-28 DIAGNOSIS — F41.9 ANXIETY: ICD-10-CM

## 2020-12-28 RX ORDER — ALPRAZOLAM 0.5 MG/1
TABLET ORAL
Qty: 30 TABLET | Refills: 3 | Status: SHIPPED | OUTPATIENT
Start: 2020-12-28 | End: 2021-05-10 | Stop reason: SDUPTHER

## 2021-02-19 DIAGNOSIS — F32.A DEPRESSION, UNSPECIFIED DEPRESSION TYPE: ICD-10-CM

## 2021-02-19 RX ORDER — DESVENLAFAXINE 50 MG/1
TABLET, EXTENDED RELEASE ORAL
Qty: 90 TABLET | Refills: 1 | Status: SHIPPED | OUTPATIENT
Start: 2021-02-19 | End: 2021-08-27

## 2021-02-22 ENCOUNTER — LAB (OUTPATIENT)
Dept: LAB | Facility: HOSPITAL | Age: 86
End: 2021-02-22
Payer: COMMERCIAL

## 2021-02-22 ENCOUNTER — OFFICE VISIT (OUTPATIENT)
Dept: FAMILY MEDICINE CLINIC | Facility: HOSPITAL | Age: 86
End: 2021-02-22
Payer: COMMERCIAL

## 2021-02-22 VITALS
SYSTOLIC BLOOD PRESSURE: 140 MMHG | HEART RATE: 84 BPM | BODY MASS INDEX: 26.47 KG/M2 | WEIGHT: 149.4 LBS | DIASTOLIC BLOOD PRESSURE: 60 MMHG | TEMPERATURE: 96.6 F | HEIGHT: 63 IN

## 2021-02-22 DIAGNOSIS — R73.9 HYPERGLYCEMIA: ICD-10-CM

## 2021-02-22 DIAGNOSIS — N32.81 OAB (OVERACTIVE BLADDER): ICD-10-CM

## 2021-02-22 DIAGNOSIS — F41.8 DEPRESSION WITH ANXIETY: ICD-10-CM

## 2021-02-22 DIAGNOSIS — E66.3 OVERWEIGHT (BMI 25.0-29.9): ICD-10-CM

## 2021-02-22 DIAGNOSIS — E78.2 MIXED HYPERLIPIDEMIA: ICD-10-CM

## 2021-02-22 DIAGNOSIS — Z00.00 MEDICARE ANNUAL WELLNESS VISIT, SUBSEQUENT: Primary | ICD-10-CM

## 2021-02-22 LAB
ALBUMIN SERPL BCP-MCNC: 4.1 G/DL (ref 3.5–5)
ALP SERPL-CCNC: 149 U/L (ref 46–116)
ALT SERPL W P-5'-P-CCNC: 30 U/L (ref 12–78)
ANION GAP SERPL CALCULATED.3IONS-SCNC: 6 MMOL/L (ref 4–13)
AST SERPL W P-5'-P-CCNC: 28 U/L (ref 5–45)
BILIRUB SERPL-MCNC: 0.81 MG/DL (ref 0.2–1)
BUN SERPL-MCNC: 12 MG/DL (ref 5–25)
CALCIUM SERPL-MCNC: 9.8 MG/DL (ref 8.3–10.1)
CHLORIDE SERPL-SCNC: 107 MMOL/L (ref 100–108)
CHOLEST SERPL-MCNC: 216 MG/DL (ref 50–200)
CO2 SERPL-SCNC: 29 MMOL/L (ref 21–32)
CREAT SERPL-MCNC: 0.81 MG/DL (ref 0.6–1.3)
ERYTHROCYTE [DISTWIDTH] IN BLOOD BY AUTOMATED COUNT: 12.7 % (ref 11.6–15.1)
EST. AVERAGE GLUCOSE BLD GHB EST-MCNC: 114 MG/DL
GFR SERPL CREATININE-BSD FRML MDRD: 67 ML/MIN/1.73SQ M
GLUCOSE P FAST SERPL-MCNC: 92 MG/DL (ref 65–99)
HBA1C MFR BLD: 5.6 %
HCT VFR BLD AUTO: 47.4 % (ref 34.8–46.1)
HDLC SERPL-MCNC: 65 MG/DL
HGB BLD-MCNC: 15.3 G/DL (ref 11.5–15.4)
LDLC SERPL CALC-MCNC: 124 MG/DL (ref 0–100)
MCH RBC QN AUTO: 29.3 PG (ref 26.8–34.3)
MCHC RBC AUTO-ENTMCNC: 32.3 G/DL (ref 31.4–37.4)
MCV RBC AUTO: 91 FL (ref 82–98)
PLATELET # BLD AUTO: 304 THOUSANDS/UL (ref 149–390)
PMV BLD AUTO: 10.7 FL (ref 8.9–12.7)
POTASSIUM SERPL-SCNC: 3.8 MMOL/L (ref 3.5–5.3)
PROT SERPL-MCNC: 7.6 G/DL (ref 6.4–8.2)
RBC # BLD AUTO: 5.22 MILLION/UL (ref 3.81–5.12)
SODIUM SERPL-SCNC: 142 MMOL/L (ref 136–145)
TRIGL SERPL-MCNC: 136 MG/DL
TSH SERPL DL<=0.05 MIU/L-ACNC: 1.39 UIU/ML (ref 0.36–3.74)
WBC # BLD AUTO: 6.85 THOUSAND/UL (ref 4.31–10.16)

## 2021-02-22 PROCEDURE — 80061 LIPID PANEL: CPT

## 2021-02-22 PROCEDURE — 84443 ASSAY THYROID STIM HORMONE: CPT

## 2021-02-22 PROCEDURE — 1125F AMNT PAIN NOTED PAIN PRSNT: CPT | Performed by: FAMILY MEDICINE

## 2021-02-22 PROCEDURE — 83036 HEMOGLOBIN GLYCOSYLATED A1C: CPT

## 2021-02-22 PROCEDURE — 1160F RVW MEDS BY RX/DR IN RCRD: CPT | Performed by: FAMILY MEDICINE

## 2021-02-22 PROCEDURE — 36415 COLL VENOUS BLD VENIPUNCTURE: CPT

## 2021-02-22 PROCEDURE — 3288F FALL RISK ASSESSMENT DOCD: CPT | Performed by: FAMILY MEDICINE

## 2021-02-22 PROCEDURE — 1170F FXNL STATUS ASSESSED: CPT | Performed by: FAMILY MEDICINE

## 2021-02-22 PROCEDURE — 99214 OFFICE O/P EST MOD 30 MIN: CPT | Performed by: FAMILY MEDICINE

## 2021-02-22 PROCEDURE — 85027 COMPLETE CBC AUTOMATED: CPT

## 2021-02-22 PROCEDURE — 1036F TOBACCO NON-USER: CPT | Performed by: FAMILY MEDICINE

## 2021-02-22 PROCEDURE — 3725F SCREEN DEPRESSION PERFORMED: CPT | Performed by: FAMILY MEDICINE

## 2021-02-22 PROCEDURE — G0439 PPPS, SUBSEQ VISIT: HCPCS | Performed by: FAMILY MEDICINE

## 2021-02-22 PROCEDURE — 80053 COMPREHEN METABOLIC PANEL: CPT

## 2021-02-22 NOTE — PROGRESS NOTES
Assessment/Plan:         Diagnoses and all orders for this visit:    Medicare annual wellness visit, subsequent    Depression with anxiety  -     CBC and Platelet; Future  -     TSH, 3rd generation; Future    Mixed hyperlipidemia  -     Lipid Panel with Direct LDL reflex; Future    Overweight (BMI 25 0-29  9)    Hyperglycemia  -     Comprehensive metabolic panel; Future  -     HEMOGLOBIN A1C W/ EAG ESTIMATION; Future    OAB (overactive bladder)  Comments:  Samples Oxybutynin ER 5mg one at bedtime          Subjective:      Patient ID: Mikaela Jameson is a 82 y o  female  Annual visit    No recent illness or injury  No COVID concern  Willing to get vaccine    Has frequency at night for the past 5 months  Drinks 1 cup caffeine every morning  Willing to try a medication for this    Mood is very stable    Feels very celestino about her overall health at her age, compares to her parents      The following portions of the patient's history were reviewed and updated as appropriate: allergies, current medications, past family history, past medical history, past social history, past surgical history and problem list     Review of Systems   Constitutional: Negative for fatigue and unexpected weight change  HENT: Negative  Eyes: Negative for visual disturbance  Respiratory: Negative  Cardiovascular: Negative  Gastrointestinal: Negative  Genitourinary: Positive for frequency and urgency  Musculoskeletal: Negative  Neurological: Negative  Hematological: Negative  Psychiatric/Behavioral: The patient is nervous/anxious  All other systems reviewed and are negative  Objective:      /60 (BP Location: Left arm, Patient Position: Sitting, Cuff Size: Standard)   Pulse 84   Temp (!) 96 6 °F (35 9 °C) (Temporal)   Ht 5' 2 5" (1 588 m)   Wt 67 8 kg (149 lb 6 4 oz)   BMI 26 89 kg/m²          Physical Exam  Vitals signs and nursing note reviewed  HENT:      Head: Normocephalic and atraumatic  Right Ear: Tympanic membrane normal       Left Ear: Tympanic membrane normal       Mouth/Throat:      Mouth: Mucous membranes are moist    Neck:      Vascular: No carotid bruit  Cardiovascular:      Rate and Rhythm: Normal rate and regular rhythm  Pulses: Normal pulses  Heart sounds: Normal heart sounds  Pulmonary:      Effort: Pulmonary effort is normal       Breath sounds: Normal breath sounds  Abdominal:      General: Abdomen is flat  Musculoskeletal:      Right lower leg: No edema  Left lower leg: No edema  Skin:     Findings: No rash  Neurological:      General: No focal deficit present  Mental Status: She is alert and oriented to person, place, and time  Psychiatric:         Mood and Affect: Mood normal          Behavior: Behavior normal          Thought Content:  Thought content normal          Judgment: Judgment normal

## 2021-02-22 NOTE — PATIENT INSTRUCTIONS
Medicare Preventive Visit Patient Instructions  Thank you for completing your Welcome to Medicare Visit or Medicare Annual Wellness Visit today  Your next wellness visit will be due in one year (2/22/2022)  The screening/preventive services that you may require over the next 5-10 years are detailed below  Some tests may not apply to you based off risk factors and/or age  Screening tests ordered at today's visit but not completed yet may show as past due  Also, please note that scanned in results may not display below  Preventive Screenings:  Service Recommendations Previous Testing/Comments   Colorectal Cancer Screening  * Colonoscopy    * Fecal Occult Blood Test (FOBT)/Fecal Immunochemical Test (FIT)  * Fecal DNA/Cologuard Test  * Flexible Sigmoidoscopy Age: 54-65 years old   Colonoscopy: every 10 years (may be performed more frequently if at higher risk)  OR  FOBT/FIT: every 1 year  OR  Cologuard: every 3 years  OR  Sigmoidoscopy: every 5 years  Screening may be recommended earlier than age 48 if at higher risk for colorectal cancer  Also, an individualized decision between you and your healthcare provider will decide whether screening between the ages of 74-80 would be appropriate  Colonoscopy: Not on file  FOBT/FIT: Not on file  Cologuard: Not on file  Sigmoidoscopy: Not on file         Breast Cancer Screening Age: 36 years old  Frequency: every 1-2 years  Not required if history of left and right mastectomy Mammogram: 06/29/2018       Cervical Cancer Screening Between the ages of 21-29, pap smear recommended once every 3 years  Between the ages of 33-67, can perform pap smear with HPV co-testing every 5 years     Recommendations may differ for women with a history of total hysterectomy, cervical cancer, or abnormal pap smears in past  Pap Smear: Not on file    Screening Not Indicated   Hepatitis C Screening Once for adults born between [facility]  More frequently in patients at high risk for Hepatitis C Hep C Antibody: Not on file       Diabetes Screening 1-2 times per year if you're at risk for diabetes or have pre-diabetes Fasting glucose: 95 mg/dL   A1C: 5 9 %       Cholesterol Screening Once every 5 years if you don't have a lipid disorder  May order more often based on risk factors  Lipid panel: 02/17/2020    Screening Not Indicated  History Lipid Disorder     Other Preventive Screenings Covered by Medicare:  1  Abdominal Aortic Aneurysm (AAA) Screening: covered once if your at risk  You're considered to be at risk if you have a family history of AAA  2  Lung Cancer Screening: covers low dose CT scan once per year if you meet all of the following conditions: (1) Age 50-69; (2) No signs or symptoms of lung cancer; (3) Current smoker or have quit smoking within the last 15 years; (4) You have a tobacco smoking history of at least 30 pack years (packs per day multiplied by number of years you smoked); (5) You get a written order from a healthcare provider  3  Glaucoma Screening: covered annually if you're considered high risk: (1) You have diabetes OR (2) Family history of glaucoma OR (3)  aged 48 and older OR (3)  American aged 72 and older  3  Osteoporosis Screening: covered every 2 years if you meet one of the following conditions: (1) You're estrogen deficient and at risk for osteoporosis based off medical history and other findings; (2) Have a vertebral abnormality; (3) On glucocorticoid therapy for more than 3 months; (4) Have primary hyperparathyroidism; (5) On osteoporosis medications and need to assess response to drug therapy  · Last bone density test (DXA Scan): Not on file  5  HIV Screening: covered annually if you're between the age of 12-76  Also covered annually if you are younger than 13 and older than 72 with risk factors for HIV infection  For pregnant patients, it is covered up to 3 times per pregnancy      Immunizations:  Immunization Recommendations   Influenza Vaccine Annual influenza vaccination during flu season is recommended for all persons aged >= 6 months who do not have contraindications   Pneumococcal Vaccine (Prevnar and Pneumovax)  * Prevnar = PCV13  * Pneumovax = PPSV23   Adults 25-60 years old: 1-3 doses may be recommended based on certain risk factors  Adults 72 years old: Prevnar (PCV13) vaccine recommended followed by Pneumovax (PPSV23) vaccine  If already received PPSV23 since turning 65, then PCV13 recommended at least one year after PPSV23 dose  Hepatitis B Vaccine 3 dose series if at intermediate or high risk (ex: diabetes, end stage renal disease, liver disease)   Tetanus (Td) Vaccine - COST NOT COVERED BY MEDICARE PART B Following completion of primary series, a booster dose should be given every 10 years to maintain immunity against tetanus  Td may also be given as tetanus wound prophylaxis  Tdap Vaccine - COST NOT COVERED BY MEDICARE PART B Recommended at least once for all adults  For pregnant patients, recommended with each pregnancy  Shingles Vaccine (Shingrix) - COST NOT COVERED BY MEDICARE PART B  2 shot series recommended in those aged 48 and above     Health Maintenance Due:      Topic Date Due    DXA SCAN  1935     Immunizations Due:      Topic Date Due    Pneumococcal Vaccine: 65+ Years (1 of 1 - PPSV23) 01/09/2002    Influenza Vaccine (1) 09/01/2020     Advance Directives   What are advance directives? Advance directives are legal documents that state your wishes and plans for medical care  These plans are made ahead of time in case you lose your ability to make decisions for yourself  Advance directives can apply to any medical decision, such as the treatments you want, and if you want to donate organs  What are the types of advance directives? There are many types of advance directives, and each state has rules about how to use them  You may choose a combination of any of the following:  · Living will:   This is a written record of the treatment you want  You can also choose which treatments you do not want, which to limit, and which to stop at a certain time  This includes surgery, medicine, IV fluid, and tube feedings  · Durable power of  for healthcare [facility]): This is a written record that states who you want to make healthcare choices for you when you are unable to make them for yourself  This person, called a proxy, is usually a family member or a friend  You may choose more than 1 proxy  · Do not resuscitate (DNR) order:  A DNR order is used in case your heart stops beating or you stop breathing  It is a request not to have certain forms of treatment, such as CPR  A DNR order may be included in other types of advance directives  · Medical directive: This covers the care that you want if you are in a coma, near death, or unable to make decisions for yourself  You can list the treatments you want for each condition  Treatment may include pain medicine, surgery, blood transfusions, dialysis, IV or tube feedings, and a ventilator (breathing machine)  · Values history: This document has questions about your views, beliefs, and how you feel and think about life  This information can help others choose the care that you would choose  Why are advance directives important? An advance directive helps you control your care  Although spoken wishes may be used, it is better to have your wishes written down  Spoken wishes can be misunderstood, or not followed  Treatments may be given even if you do not want them  An advance directive may make it easier for your family to make difficult choices about your care  Urinary Incontinence   Urinary incontinence (UI)  is when you lose control of your bladder  UI develops because your bladder cannot store or empty urine properly  The 3 most common types of UI are stress incontinence, urge incontinence, or both    Medicines:   · May be given to help strengthen your bladder control  Report any side effects of medication to your healthcare provider  Do pelvic muscle exercises often:  Your pelvic muscles help you stop urinating  Squeeze these muscles tight for 5 seconds, then relax for 5 seconds  Gradually work up to squeezing for 10 seconds  Do 3 sets of 15 repetitions a day, or as directed  This will help strengthen your pelvic muscles and improve bladder control  Train your bladder:  Go to the bathroom at set times, such as every 2 hours, even if you do not feel the urge to go  You can also try to hold your urine when you feel the urge to go  For example, hold your urine for 5 minutes when you feel the urge to go  As that becomes easier, hold your urine for 10 minutes  Self-care:   · Keep a UI record  Write down how often you leak urine and how much you leak  Make a note of what you were doing when you leaked urine  · Drink liquids as directed  You may need to limit the amount of liquid you drink to help control your urine leakage  Do not drink any liquid right before you go to bed  Limit or do not have drinks that contain caffeine or alcohol  · Prevent constipation  Eat a variety of high-fiber foods  Good examples are high-fiber cereals, beans, vegetables, and whole-grain breads  Walking is the best way to trigger your intestines to have a bowel movement  · Exercise regularly and maintain a healthy weight  Weight loss and exercise will decrease pressure on your bladder and help you control your leakage  · Use a catheter as directed  to help empty your bladder  A catheter is a tiny, plastic tube that is put into your bladder to drain your urine  · Go to behavior therapy as directed  Behavior therapy may be used to help you learn to control your urge to urinate      Weight Management   Why it is important to manage your weight:  Being overweight increases your risk of health conditions such as heart disease, high blood pressure, type 2 diabetes, and certain types of cancer  It can also increase your risk for osteoarthritis, sleep apnea, and other respiratory problems  Aim for a slow, steady weight loss  Even a small amount of weight loss can lower your risk of health problems  How to lose weight safely:  A safe and healthy way to lose weight is to eat fewer calories and get regular exercise  You can lose up about 1 pound a week by decreasing the number of calories you eat by 500 calories each day  Healthy meal plan for weight management:  A healthy meal plan includes a variety of foods, contains fewer calories, and helps you stay healthy  A healthy meal plan includes the following:  · Eat whole-grain foods more often  A healthy meal plan should contain fiber  Fiber is the part of grains, fruits, and vegetables that is not broken down by your body  Whole-grain foods are healthy and provide extra fiber in your diet  Some examples of whole-grain foods are whole-wheat breads and pastas, oatmeal, brown rice, and bulgur  · Eat a variety of vegetables every day  Include dark, leafy greens such as spinach, kale, fadi greens, and mustard greens  Eat yellow and orange vegetables such as carrots, sweet potatoes, and winter squash  · Eat a variety of fruits every day  Choose fresh or canned fruit (canned in its own juice or light syrup) instead of juice  Fruit juice has very little or no fiber  · Eat low-fat dairy foods  Drink fat-free (skim) milk or 1% milk  Eat fat-free yogurt and low-fat cottage cheese  Try low-fat cheeses such as mozzarella and other reduced-fat cheeses  · Choose meat and other protein foods that are low in fat  Choose beans or other legumes such as split peas or lentils  Choose fish, skinless poultry (chicken or turkey), or lean cuts of red meat (beef or pork)  Before you cook meat or poultry, cut off any visible fat  · Use less fat and oil  Try baking foods instead of frying them   Add less fat, such as margarine, sour cream, regular salad dressing and mayonnaise to foods  Eat fewer high-fat foods  Some examples of high-fat foods include french fries, doughnuts, ice cream, and cakes  · Eat fewer sweets  Limit foods and drinks that are high in sugar  This includes candy, cookies, regular soda, and sweetened drinks  Exercise:  Exercise at least 30 minutes per day on most days of the week  Some examples of exercise include walking, biking, dancing, and swimming  You can also fit in more physical activity by taking the stairs instead of the elevator or parking farther away from stores  Ask your healthcare provider about the best exercise plan for you  © Copyright [employer] 2018 Information is for End User's use only and may not be sold, redistributed or otherwise used for commercial purposes  All illustrations and images included in CareNotes® are the copyrighted property of A [employer] A M [employer]  or Serviceful Visit for Adults   AMBULATORY CARE:   A wellness visit  is when you see your healthcare provider to get screened for health problems  Your healthcare provider will also give you advice on how to stay healthy  Write down your questions so you remember to ask them  Ask your healthcare provider how often you should have a wellness visit  What happens at a wellness visit:  Your healthcare provider will ask about your health, and your family history of health problems  This includes high blood pressure, heart disease, and cancer  He or she will ask if you have symptoms that concern you, if you smoke, and about your mood  You may also be asked about your intake of medicines, supplements, food, and alcohol  Any of the following may be done:  · Your weight  will be checked  Your height may also be checked so your body mass index (BMI) can be calculated  Your BMI shows if you are at a healthy weight  · Your blood pressure  and heart rate will be checked  Your temperature may also be checked  · Blood and urine tests  may be done   Blood tests may be done to check your cholesterol levels  Abnormal cholesterol levels increase your risk for heart disease and stroke  You may also need a blood or urine test to check for diabetes if you are at increased risk  Urine tests may be done to look for signs of an infection or kidney disease  · A physical exam  includes checking your heartbeat and lungs with a stethoscope  Your healthcare provider may also check your skin to look for sun damage  · Screening tests  may be recommended  A screening test is done to check for diseases that may not cause symptoms  The screening tests you may need depend on your age, gender, family history, and lifestyle habits  For example, colorectal screening may be recommended if you are 48years old or older  Screening tests you need if you are a woman:   · A Pap smear  is used to screen for cervical cancer  Pap smears are usually done every 3 to 5 years depending on your age  You may need them more often if you have had abnormal Pap smear test results in the past  Ask your healthcare provider how often you should have a Pap smear  · A mammogram  is an x-ray of your breasts to screen for breast cancer  Experts recommend mammograms every 2 years starting at age 48 years  You may need a mammogram at age 52 years or younger if you have an increased risk for breast cancer  Talk to your healthcare provider about when you should start having mammograms and how often you need them  Vaccines you may need:   · Get an influenza vaccine  every year  The influenza vaccine protects you from the flu  Several types of viruses cause the flu  The viruses change over time, so new vaccines are made each year  · Get a tetanus-diphtheria (Td) booster vaccine  every 10 years  This vaccine protects you against tetanus and diphtheria  Tetanus is a severe infection that may cause painful muscle spasms and lockjaw   Diphtheria is a severe bacterial infection that causes a thick covering in the back of your mouth and throat  · Get a human papillomavirus (HPV) vaccine  if you are female and aged 23 to 32 or male 23 to 24 and never received it  This vaccine protects you from HPV infection  HPV is the most common infection spread by sexual contact  HPV may also cause vaginal, penile, and anal cancers  · Get a pneumococcal vaccine  if you are aged 72 years or older  The pneumococcal vaccine is an injection given to protect you from pneumococcal disease  Pneumococcal disease is an infection caused by pneumococcal bacteria  The infection may cause pneumonia, meningitis, or an ear infection  · Get a shingles vaccine  if you are 60 or older, even if you have had shingles before  The shingles vaccine is an injection to protect you from the varicella-zoster virus  This is the same virus that causes chickenpox  Shingles is a painful rash that develops in people who had chickenpox or have been exposed to the virus  How to eat healthy:  My Plate is a model for planning healthy meals  It shows the types and amounts of foods that should go on your plate  Fruits and vegetables make up about half of your plate, and grains and protein make up the other half  A serving of dairy is included on the side of your plate  The amount of calories and serving sizes you need depends on your age, gender, weight, and height  Examples of healthy foods are listed below:  · Eat a variety of vegetables  such as dark green, red, and orange vegetables  You can also include canned vegetables low in sodium (salt) and frozen vegetables without added butter or sauces  · Eat a variety of fresh fruits , canned fruit in 100% juice, frozen fruit, and dried fruit  · Include whole grains  At least half of the grains you eat should be whole grains   Examples include whole-wheat bread, wheat pasta, brown rice, and whole-grain cereals such as oatmeal     · Eat a variety of protein foods such as seafood (fish and shellfish), lean meat, and poultry without skin (turkey and chicken)  Examples of lean meats include pork leg, shoulder, or tenderloin, and beef round, sirloin, tenderloin, and extra lean ground beef  Other protein foods include eggs and egg substitutes, beans, peas, soy products, nuts, and seeds  · Choose low-fat dairy products such as skim or 1% milk or low-fat yogurt, cheese, and cottage cheese  · Limit unhealthy fats  such as butter, hard margarine, and shortening  Exercise:  Exercise at least 30 minutes per day on most days of the week  Some examples of exercise include walking, biking, dancing, and swimming  You can also fit in more physical activity by taking the stairs instead of the elevator or parking farther away from stores  Include muscle strengthening activities 2 days each week  Regular exercise provides many health benefits  It helps you manage your weight, and decreases your risk for type 2 diabetes, heart disease, stroke, and high blood pressure  Exercise can also help improve your mood  Ask your healthcare provider about the best exercise plan for you  General health and safety guidelines:   · Do not smoke  Nicotine and other chemicals in cigarettes and cigars can cause lung damage  Ask your healthcare provider for information if you currently smoke and need help to quit  E-cigarettes or smokeless tobacco still contain nicotine  Talk to your healthcare provider before you use these products  · Limit alcohol  A drink of alcohol is 12 ounces of beer, 5 ounces of wine, or 1½ ounces of liquor  · Lose weight, if needed  Being overweight increases your risk of certain health conditions  These include heart disease, high blood pressure, type 2 diabetes, and certain types of cancer  · Protect your skin  Do not sunbathe or use tanning beds  Use sunscreen with a SPF 15 or higher  Apply sunscreen at least 15 minutes before you go outside  Reapply sunscreen every 2 hours   Wear protective clothing, hats, and sunglasses when you are outside  · Drive safely  Always wear your seatbelt  Make sure everyone in your car wears a seatbelt  A seatbelt can save your life if you are in an accident  Do not use your cell phone when you are driving  This could distract you and cause an accident  Pull over if you need to make a call or send a text message  · Practice safe sex  Use latex condoms if are sexually active and have more than one partner  Your healthcare provider may recommend screening tests for sexually transmitted infections (STIs)  · Wear helmets, lifejackets, and protective gear  Always wear a helmet when you ride a bike or motorcycle, go skiing, or play sports that could cause a head injury  Wear protective equipment when you play sports  Wear a lifejacket when you are on a boat or doing water sports  © Copyright [address] Information is for End User's use only and may not be sold, redistributed or otherwise used for commercial purposes  All illustrations and images included in CareNotes® are the copyrighted property of [employer]  or [facility] [place]   The above information is an  only  It is not intended as medical advice for individual conditions or treatments  Talk to your doctor, nurse or pharmacist before following any medical regimen to see if it is safe and effective for you

## 2021-02-22 NOTE — PROGRESS NOTES
Assessment and Plan:     Problem List Items Addressed This Visit        Other    Depression with anxiety    Relevant Orders    CBC and Platelet (Completed)    TSH, 3rd generation (Completed)    Mixed hyperlipidemia    Relevant Orders    Lipid Panel with Direct LDL reflex (Completed)    Hyperglycemia    Relevant Orders    Comprehensive metabolic panel (Completed)    HEMOGLOBIN A1C W/ EAG ESTIMATION (Completed)    Medicare annual wellness visit, subsequent - Primary    Overweight (BMI 25 0-29  9)      Other Visit Diagnoses     OAB (overactive bladder)        Samples Oxybutynin ER 5mg one at bedtime        BMI Counseling: Body mass index is 26 89 kg/m²  The BMI is above normal  Nutrition recommendations include decreasing portion sizes, moderation in carbohydrate intake and reducing intake of cholesterol  Exercise recommendations include exercising 3-5 times per week and strength training exercises  No pharmacotherapy was ordered  Preventive health issues were discussed with patient, and age appropriate screening tests were ordered as noted in patient's After Visit Summary  Personalized health advice and appropriate referrals for health education or preventive services given if needed, as noted in patient's After Visit Summary  History of Present Illness:     Patient presents for Medicare Annual Wellness visit    Patient Care Team:  Umu Martinez MD as PCP - General  Umu Martinez MD     Problem List:     Patient Active Problem List   Diagnosis    Anxiety disorder    Benign paroxysmal positional vertigo    Depression with anxiety    Esophagitis, reflux    Glaucoma    Mixed hyperlipidemia    Osteopenia    Pain of right heel    Hyperglycemia    Cervical disc disease    Medicare annual wellness visit, subsequent    Overweight (BMI 25 0-29  9)    Current mild episode of major depressive disorder without prior episode [facility])      Past Medical and Surgical History:     Past Medical History: Diagnosis Date    Anxiety     Depression     Glaucoma     Hyperlipemia      Past Surgical History:   Procedure Laterality Date    APPENDECTOMY      HYSTERECTOMY        Family History:     Family History   Problem Relation Age of Onset   Kelly Splinter Hypertension Mother     Depression Mother     Stroke Father     Alcohol abuse Son     Substance Abuse Son       Social History:        Social History     Socioeconomic History    Marital status:      Spouse name: None    Number of children: None    Years of education: None    Highest education level: None   Occupational History    None   Social Needs    Financial resource strain: None    Food insecurity     Worry: None     Inability: None    Transportation needs     Medical: None     Non-medical: None   Tobacco Use    Smoking status: Never Smoker    Smokeless tobacco: Never Used   Substance and Sexual Activity    Alcohol use: Yes     Comment: Rarely    Drug use: No    Sexual activity: None   Lifestyle    Physical activity     Days per week: None     Minutes per session: None    Stress: None   Relationships    Social connections     Talks on phone: None     Gets together: None     Attends Sabianist service: None     Active member of club or organization: None     Attends meetings of clubs or organizations: None     Relationship status: None    Intimate partner violence     Fear of current or ex partner: None     Emotionally abused: None     Physically abused: None     Forced sexual activity: None   Other Topics Concern    None   Social History Narrative    Always uses seat belt       Medications and Allergies:     Current Outpatient Medications   Medication Sig Dispense Refill    ALPRAZolam (XANAX) 0 5 mg tablet TAKE 1 TABLET BY MOUTH EVERY 4 TO 6 HOURS AS NEEDED 30 tablet 3    aspirin (ECOTRIN LOW STRENGTH) 81 mg EC tablet Take by mouth      atorvastatin (LIPITOR) 20 mg tablet TAKE 1 TABLET BY MOUTH EVERY DAY 90 tablet 3    desvenlafaxine succinate (PRISTIQ) 50 mg 24 hr tablet TAKE 1 TABLET BY MOUTH EVERY DAY 90 tablet 1    MULTIPLE VITAMINS-CALCIUM PO Take 1 tablet by mouth daily      Omega-3 Fatty Acids (FISH OIL) 645 MG CAPS Take by mouth      TRAVATAN Z 0 004 % ophthalmic solution Administer 1 drop to both eyes daily at bedtime  5     No current facility-administered medications for this visit  No Known Allergies   Immunizations: There is no immunization history for the selected administration types on file for this patient  Health Maintenance:         Topic Date Due    DXA SCAN  1935         Topic Date Due    Pneumococcal Vaccine: 65+ Years (1 of 1 - PPSV23) 01/09/2002    Influenza Vaccine (1) 09/01/2020      Medicare Health Risk Assessment:     /60 (BP Location: Left arm, Patient Position: Sitting, Cuff Size: Standard)   Pulse 84   Temp (!) 96 6 °F (35 9 °C) (Temporal)   Ht 5' 2 5" (1 588 m)   Wt 67 8 kg (149 lb 6 4 oz)   BMI 26 89 kg/m²      Ayana Madrigal is here for her Subsequent Wellness visit  Last Medicare Wellness visit information reviewed, patient interviewed and updates made to the record today  Health Risk Assessment:   Patient rates overall health as very good  Patient feels that their physical health rating is same  Eyesight was rated as slightly worse  Hearing was rated as same  Patient feels that their emotional and mental health rating is slightly worse  Pain experienced in the last 7 days has been some  Patient's pain rating has been 5/10  Patient states that she has experienced no weight loss or gain in last 6 months  Depression Screening:   PHQ-2 Score: 1  PHQ-9 Score: 3      Fall Risk Screening: In the past year, patient has experienced: no history of falling in past year      Urinary Incontinence Screening:   Patient has leaked urine accidently in the last six months  Home Safety:  Patient has trouble with stairs inside or outside of their home   Patient has working smoke alarms and has no working carbon monoxide detector  Home safety hazards include: none  Nutrition:   Current diet is Regular  Medications:   Patient is currently taking over-the-counter supplements  OTC medications include: see medication list  Patient is able to manage medications  Activities of Daily Living (ADLs)/Instrumental Activities of Daily Living (IADLs):   Walk and transfer into and out of bed and chair?: Yes  Dress and groom yourself?: Yes    Bathe or shower yourself?: Yes    Feed yourself? Yes  Do your laundry/housekeeping?: Yes  Manage your money, pay your bills and track your expenses?: Yes  Make your own meals?: Yes    Do your own shopping?: Yes    Previous Hospitalizations:   Any hospitalizations or ED visits within the last 12 months?: No      Advance Care Planning:   Living will: Yes    Durable POA for healthcare:  Yes    Advanced directive: Yes    Advanced directive counseling given: No    Five wishes given: No    Patient declined ACP directive: No    End of Life Decisions reviewed with patient: Yes    Provider agrees with end of life decisions: Yes      Cognitive Screening:   Provider or family/friend/caregiver concerned regarding cognition?: No    PREVENTIVE SCREENINGS      Cardiovascular Screening:    General: Screening Not Indicated and History Lipid Disorder      Diabetes Screening:     General: Screening Current      Colorectal Cancer Screening:     General: Screening Not Indicated      Breast Cancer Screening:     General: Risks and Benefits Discussed    Due for: Mammogram        Cervical Cancer Screening:    General: Screening Not Indicated      Osteoporosis Screening:    General: Risks and Benefits Discussed and Patient Declines      Abdominal Aortic Aneurysm (AAA) Screening:        General: Screening Not Indicated      Lung Cancer Screening:     General: Screening Not Indicated      Hepatitis C Screening:    General: Screening Not Indicated      Denae Forman MD

## 2021-05-10 DIAGNOSIS — F41.9 ANXIETY: ICD-10-CM

## 2021-05-10 RX ORDER — ALPRAZOLAM 0.5 MG/1
TABLET ORAL
Qty: 30 TABLET | Refills: 1 | Status: SHIPPED | OUTPATIENT
Start: 2021-05-10 | End: 2021-07-26 | Stop reason: SDUPTHER

## 2021-05-18 DIAGNOSIS — E78.2 MIXED HYPERLIPIDEMIA: ICD-10-CM

## 2021-05-18 RX ORDER — ATORVASTATIN CALCIUM 20 MG/1
TABLET, FILM COATED ORAL
Qty: 90 TABLET | Refills: 3 | Status: SHIPPED | OUTPATIENT
Start: 2021-05-18 | End: 2022-05-17

## 2021-07-26 DIAGNOSIS — F41.9 ANXIETY: ICD-10-CM

## 2021-07-26 RX ORDER — ALPRAZOLAM 0.5 MG/1
TABLET ORAL
Qty: 30 TABLET | Refills: 1 | Status: SHIPPED | OUTPATIENT
Start: 2021-07-26 | End: 2022-02-23 | Stop reason: SDUPTHER

## 2021-08-27 DIAGNOSIS — F32.A DEPRESSION, UNSPECIFIED DEPRESSION TYPE: ICD-10-CM

## 2021-08-27 RX ORDER — DESVENLAFAXINE 50 MG/1
TABLET, EXTENDED RELEASE ORAL
Qty: 90 TABLET | Refills: 1 | Status: SHIPPED | OUTPATIENT
Start: 2021-08-27 | End: 2022-02-23

## 2022-02-23 ENCOUNTER — LAB (OUTPATIENT)
Dept: LAB | Facility: HOSPITAL | Age: 87
End: 2022-02-23
Payer: COMMERCIAL

## 2022-02-23 ENCOUNTER — OFFICE VISIT (OUTPATIENT)
Dept: FAMILY MEDICINE CLINIC | Facility: HOSPITAL | Age: 87
End: 2022-02-23
Payer: COMMERCIAL

## 2022-02-23 VITALS
HEART RATE: 52 BPM | WEIGHT: 143.2 LBS | SYSTOLIC BLOOD PRESSURE: 125 MMHG | BODY MASS INDEX: 25.37 KG/M2 | TEMPERATURE: 96.8 F | OXYGEN SATURATION: 97 % | HEIGHT: 63 IN | DIASTOLIC BLOOD PRESSURE: 80 MMHG

## 2022-02-23 DIAGNOSIS — R73.9 HYPERGLYCEMIA: ICD-10-CM

## 2022-02-23 DIAGNOSIS — Z00.00 MEDICARE ANNUAL WELLNESS VISIT, SUBSEQUENT: Primary | ICD-10-CM

## 2022-02-23 DIAGNOSIS — F41.9 ANXIETY: ICD-10-CM

## 2022-02-23 DIAGNOSIS — F41.8 DEPRESSION WITH ANXIETY: ICD-10-CM

## 2022-02-23 DIAGNOSIS — F32.A DEPRESSION, UNSPECIFIED DEPRESSION TYPE: ICD-10-CM

## 2022-02-23 DIAGNOSIS — E78.2 MIXED HYPERLIPIDEMIA: ICD-10-CM

## 2022-02-23 LAB
ALBUMIN SERPL BCP-MCNC: 3.9 G/DL (ref 3.5–5)
ALP SERPL-CCNC: 146 U/L (ref 46–116)
ALT SERPL W P-5'-P-CCNC: 31 U/L (ref 12–78)
ANION GAP SERPL CALCULATED.3IONS-SCNC: 5 MMOL/L (ref 4–13)
AST SERPL W P-5'-P-CCNC: 36 U/L (ref 5–45)
BILIRUB SERPL-MCNC: 0.83 MG/DL (ref 0.2–1)
BUN SERPL-MCNC: 10 MG/DL (ref 5–25)
CALCIUM SERPL-MCNC: 9.8 MG/DL (ref 8.3–10.1)
CHLORIDE SERPL-SCNC: 105 MMOL/L (ref 100–108)
CHOLEST SERPL-MCNC: 197 MG/DL
CO2 SERPL-SCNC: 29 MMOL/L (ref 21–32)
CREAT SERPL-MCNC: 0.8 MG/DL (ref 0.6–1.3)
ERYTHROCYTE [DISTWIDTH] IN BLOOD BY AUTOMATED COUNT: 12.8 % (ref 11.6–15.1)
EST. AVERAGE GLUCOSE BLD GHB EST-MCNC: 117 MG/DL
GFR SERPL CREATININE-BSD FRML MDRD: 67 ML/MIN/1.73SQ M
GLUCOSE P FAST SERPL-MCNC: 100 MG/DL (ref 65–99)
HBA1C MFR BLD: 5.7 %
HCT VFR BLD AUTO: 49.2 % (ref 34.8–46.1)
HDLC SERPL-MCNC: 56 MG/DL
HGB BLD-MCNC: 15.7 G/DL (ref 11.5–15.4)
LDLC SERPL CALC-MCNC: 108 MG/DL (ref 0–100)
MCH RBC QN AUTO: 29.1 PG (ref 26.8–34.3)
MCHC RBC AUTO-ENTMCNC: 31.9 G/DL (ref 31.4–37.4)
MCV RBC AUTO: 91 FL (ref 82–98)
PLATELET # BLD AUTO: 299 THOUSANDS/UL (ref 149–390)
PMV BLD AUTO: 11.5 FL (ref 8.9–12.7)
POTASSIUM SERPL-SCNC: 3.9 MMOL/L (ref 3.5–5.3)
PROT SERPL-MCNC: 7.1 G/DL (ref 6.4–8.2)
RBC # BLD AUTO: 5.4 MILLION/UL (ref 3.81–5.12)
SODIUM SERPL-SCNC: 139 MMOL/L (ref 136–145)
TRIGL SERPL-MCNC: 166 MG/DL
TSH SERPL DL<=0.05 MIU/L-ACNC: 1.61 UIU/ML (ref 0.36–3.74)
WBC # BLD AUTO: 6.42 THOUSAND/UL (ref 4.31–10.16)

## 2022-02-23 PROCEDURE — 85027 COMPLETE CBC AUTOMATED: CPT

## 2022-02-23 PROCEDURE — 36415 COLL VENOUS BLD VENIPUNCTURE: CPT

## 2022-02-23 PROCEDURE — 84443 ASSAY THYROID STIM HORMONE: CPT

## 2022-02-23 PROCEDURE — 1125F AMNT PAIN NOTED PAIN PRSNT: CPT | Performed by: FAMILY MEDICINE

## 2022-02-23 PROCEDURE — 3725F SCREEN DEPRESSION PERFORMED: CPT | Performed by: FAMILY MEDICINE

## 2022-02-23 PROCEDURE — 1090F PRES/ABSN URINE INCON ASSESS: CPT | Performed by: FAMILY MEDICINE

## 2022-02-23 PROCEDURE — 80053 COMPREHEN METABOLIC PANEL: CPT

## 2022-02-23 PROCEDURE — 1036F TOBACCO NON-USER: CPT | Performed by: FAMILY MEDICINE

## 2022-02-23 PROCEDURE — 3288F FALL RISK ASSESSMENT DOCD: CPT | Performed by: FAMILY MEDICINE

## 2022-02-23 PROCEDURE — 80061 LIPID PANEL: CPT

## 2022-02-23 PROCEDURE — G0439 PPPS, SUBSEQ VISIT: HCPCS | Performed by: FAMILY MEDICINE

## 2022-02-23 PROCEDURE — 1003F LEVEL OF ACTIVITY ASSESS: CPT | Performed by: FAMILY MEDICINE

## 2022-02-23 PROCEDURE — 1170F FXNL STATUS ASSESSED: CPT | Performed by: FAMILY MEDICINE

## 2022-02-23 PROCEDURE — 1160F RVW MEDS BY RX/DR IN RCRD: CPT | Performed by: FAMILY MEDICINE

## 2022-02-23 PROCEDURE — 83036 HEMOGLOBIN GLYCOSYLATED A1C: CPT

## 2022-02-23 PROCEDURE — 99214 OFFICE O/P EST MOD 30 MIN: CPT | Performed by: FAMILY MEDICINE

## 2022-02-23 RX ORDER — DESVENLAFAXINE 50 MG/1
TABLET, EXTENDED RELEASE ORAL
Qty: 90 TABLET | Refills: 1 | Status: SHIPPED | OUTPATIENT
Start: 2022-02-23

## 2022-02-23 RX ORDER — ALPRAZOLAM 0.5 MG/1
TABLET ORAL
Qty: 50 TABLET | Refills: 1 | Status: SHIPPED | OUTPATIENT
Start: 2022-02-23

## 2022-02-23 RX ORDER — LATANOPROST 50 UG/ML
SOLUTION/ DROPS OPHTHALMIC
COMMUNITY
Start: 2022-01-02

## 2022-02-23 NOTE — PROGRESS NOTES
Assessment and Plan:     Problem List Items Addressed This Visit        Other    Anxiety    Relevant Medications    ALPRAZolam (XANAX) 0 5 mg tablet    Depression with anxiety    Relevant Medications    ALPRAZolam (XANAX) 0 5 mg tablet    Other Relevant Orders    CBC and Platelet    TSH, 3rd generation with Free T4 reflex    Mixed hyperlipidemia    Relevant Orders    Lipid Panel with Direct LDL reflex    Hyperglycemia    Relevant Orders    Comprehensive metabolic panel    HEMOGLOBIN A1C W/ EAG ESTIMATION    Medicare annual wellness visit, subsequent - Primary        BMI Counseling: Body mass index is 25 37 kg/m²  The BMI is above normal  Nutrition recommendations include decreasing portion sizes, moderation in carbohydrate intake and reducing intake of cholesterol  Exercise recommendations include exercising 3-5 times per week  No pharmacotherapy was ordered  Rationale for BMI follow-up plan is due to patient being overweight or obese  Preventive health issues were discussed with patient, and age appropriate screening tests were ordered as noted in patient's After Visit Summary  Personalized health advice and appropriate referrals for health education or preventive services given if needed, as noted in patient's After Visit Summary  History of Present Illness:     Patient presents for Medicare Annual Wellness visit    Patient Care Team:  Meredith Danielson MD as PCP - General  Meredith Danielson MD     Problem List:     Patient Active Problem List   Diagnosis    Anxiety    Benign paroxysmal positional vertigo    Depression with anxiety    Esophagitis, reflux    Glaucoma    Mixed hyperlipidemia    Osteopenia    Pain of right heel    Hyperglycemia    Cervical disc disease    Medicare annual wellness visit, subsequent    Overweight (BMI 25 0-29  9)    Current mild episode of major depressive disorder without prior episode [facility])      Past Medical and Surgical History:     Past Medical History: Diagnosis Date    Anxiety     Depression     Glaucoma     Hyperlipemia      Past Surgical History:   Procedure Laterality Date    APPENDECTOMY      HYSTERECTOMY        Family History:     Family History   Problem Relation Age of Onset   Sisi Tristan Hypertension Mother     Depression Mother     Stroke Father     Alcohol abuse Son     Substance Abuse Son       Social History:     Social History     Socioeconomic History    Marital status:      Spouse name: None    Number of children: None    Years of education: None    Highest education level: None   Occupational History    None   Tobacco Use    Smoking status: Never Smoker    Smokeless tobacco: Never Used   Substance and Sexual Activity    Alcohol use: Yes     Comment: Rarely    Drug use: No    Sexual activity: None   Other Topics Concern    None   Social History Narrative    Always uses seat belt      Social Determinants of Health     Financial Resource Strain: Not on file   Food Insecurity: Not on file   Transportation Needs: Not on file   Physical Activity: Not on file   Stress: Not on file   Social Connections: Not on file   Intimate Partner Violence: Not on file   Housing Stability: Not on file      Medications and Allergies:     Current Outpatient Medications   Medication Sig Dispense Refill    aspirin (ECOTRIN LOW STRENGTH) 81 mg EC tablet Take by mouth      atorvastatin (LIPITOR) 20 mg tablet TAKE 1 TABLET BY MOUTH EVERY DAY 90 tablet 3    desvenlafaxine succinate (PRISTIQ) 50 mg 24 hr tablet TAKE 1 TABLET BY MOUTH EVERY DAY 90 tablet 1    latanoprost (XALATAN) 0 005 % ophthalmic solution INSTILL 1 DROP INTO BOTH EYES AT BEDTIME      MULTIPLE VITAMINS-CALCIUM PO Take 1 tablet by mouth daily      Omega-3 Fatty Acids (FISH OIL) 645 MG CAPS Take by mouth      TRAVATAN Z 0 004 % ophthalmic solution Administer 1 drop to both eyes daily at bedtime  5    ALPRAZolam (XANAX) 0 5 mg tablet TAKE 1 TABLET BY MOUTH EVERY 4 TO 6 HOURS AS NEEDED 50 tablet 1     No current facility-administered medications for this visit  No Known Allergies   Immunizations: There is no immunization history for the selected administration types on file for this patient  Health Maintenance:         Topic Date Due    DXA SCAN  Never done         Topic Date Due    COVID-19 Vaccine (1) Never done    DTaP,Tdap,and Td Vaccines (1 - Tdap) Never done    Pneumococcal Vaccine: 65+ Years (1 of 1 - PPSV23) Never done    Influenza Vaccine (1) Never done      Medicare Health Risk Assessment:     /80 (BP Location: Left arm, Patient Position: Sitting, Cuff Size: Standard)   Pulse (!) 52   Temp (!) 96 8 °F (36 °C) (Tympanic)   Ht 5' 3" (1 6 m)   Wt 65 kg (143 lb 3 2 oz)   SpO2 97%   BMI 25 37 kg/m²      Humberto Linares is here for her Subsequent Wellness visit  Last Medicare Wellness visit information reviewed, patient interviewed and updates made to the record today  Health Risk Assessment:   Patient rates overall health as very good  Patient feels that their physical health rating is same  Patient is satisfied with their life  Eyesight was rated as slightly worse  Hearing was rated as same  Patient feels that their emotional and mental health rating is same  Patients states they are sometimes angry  Patient states they are sometimes unusually tired/fatigued  Pain experienced in the last 7 days has been none  Patient states that she has experienced no weight loss or gain in last 6 months  Depression Screening:   PHQ-9 Score: 8      Fall Risk Screening: In the past year, patient has experienced: no history of falling in past year      Urinary Incontinence Screening:   Patient has not leaked urine accidently in the last six months  Home Safety:  Patient does not have trouble with stairs inside or outside of their home  Patient has working smoke alarms and has no working carbon monoxide detector  Home safety hazards include: none       Nutrition:   Current diet is Regular  Medications:   Patient is not currently taking any over-the-counter supplements  Patient is able to manage medications  Activities of Daily Living (ADLs)/Instrumental Activities of Daily Living (IADLs):   Walk and transfer into and out of bed and chair?: Yes  Dress and groom yourself?: Yes    Bathe or shower yourself?: Yes    Feed yourself? Yes  Do your laundry/housekeeping?: Yes  Manage your money, pay your bills and track your expenses?: Yes  Make your own meals?: Yes    Do your own shopping?: Yes    Previous Hospitalizations:   Any hospitalizations or ED visits within the last 12 months?: No      Advance Care Planning:   Living will: Yes    Durable POA for healthcare:  Yes    Advanced directive: Yes    Advanced directive counseling given: No    Five wishes given: Yes    End of Life Decisions reviewed with patient: Yes    Provider agrees with end of life decisions: Yes      Cognitive Screening:   Provider or family/friend/caregiver concerned regarding cognition?: No    PREVENTIVE SCREENINGS      Cardiovascular Screening:    General: Screening Not Indicated and History Lipid Disorder      Diabetes Screening:     General: Risks and Benefits Discussed    Due for: Blood Glucose      Colorectal Cancer Screening:     General: Screening Not Indicated      Breast Cancer Screening:     General: Risks and Benefits Discussed    Due for: Mammogram        Cervical Cancer Screening:    General: Screening Not Indicated      Osteoporosis Screening:    General: Risks and Benefits Discussed    Due for: DXA Axial      Abdominal Aortic Aneurysm (AAA) Screening:        General: Screening Not Indicated      Lung Cancer Screening:     General: Screening Not Indicated      Hepatitis C Screening:    General: Screening Not Indicated    Screening, Brief Intervention, and Referral to Treatment (SBIRT)    Screening  Typical number of drinks in a day: 0  Typical number of drinks in a week: 0  Interpretation: Low risk drinking behavior      Single Item Drug Screening:  How often have you used an illegal drug (including marijuana) or a prescription medication for non-medical reasons in the past year? never    Single Item Drug Screen Score: 0  Interpretation: Negative screen for possible drug use disorder      Jose Dixon MD

## 2022-02-23 NOTE — PROGRESS NOTES
Assessment/Plan:         Diagnoses and all orders for this visit:    Medicare annual wellness visit, subsequent    Depression with anxiety  -     CBC and Platelet; Future  -     TSH, 3rd generation with Free T4 reflex; Future    Mixed hyperlipidemia  -     Lipid Panel with Direct LDL reflex; Future    Hyperglycemia  -     Comprehensive metabolic panel; Future  -     HEMOGLOBIN A1C W/ EAG ESTIMATION; Future    Anxiety  -     ALPRAZolam (XANAX) 0 5 mg tablet; TAKE 1 TABLET BY MOUTH EVERY 4 TO 6 HOURS AS NEEDED    Other orders  -     latanoprost (XALATAN) 0 005 % ophthalmic solution; INSTILL 1 DROP INTO BOTH EYES AT BEDTIME          Subjective:      Patient ID: Jodee Rehman is a 82 y o  female  Annual check up    Has many things that are overwhelming her- car,household maintenance  Brother with multiple medical issues    Uses Alprazolam with benefit, wonders if she can take it a bit more    Medication list reviewed and revised    Note ophth exam- glaucoma well controlled      The following portions of the patient's history were reviewed and updated as appropriate: allergies, current medications, past family history, past medical history, past social history, past surgical history and problem list     Review of Systems   Constitutional: Negative for fatigue and unexpected weight change  HENT: Negative  Respiratory: Negative  Cardiovascular: Negative  Genitourinary: Positive for frequency  Psychiatric/Behavioral: Positive for dysphoric mood  The patient is nervous/anxious  All other systems reviewed and are negative  Objective:      /80 (BP Location: Left arm, Patient Position: Sitting, Cuff Size: Standard)   Pulse (!) 52   Temp (!) 96 8 °F (36 °C) (Tympanic)   Ht 5' 3" (1 6 m)   Wt 65 kg (143 lb 3 2 oz)   SpO2 97%   BMI 25 37 kg/m²          Physical Exam  Vitals and nursing note reviewed  Constitutional:       Appearance: Normal appearance  Neck:      Vascular: No carotid bruit  Cardiovascular:      Rate and Rhythm: Normal rate and regular rhythm  Pulses: Normal pulses  Heart sounds: Normal heart sounds  Pulmonary:      Effort: Pulmonary effort is normal       Breath sounds: Normal breath sounds  Musculoskeletal:      Right lower leg: No edema  Left lower leg: No edema  Neurological:      General: No focal deficit present  Mental Status: She is alert and oriented to person, place, and time  Psychiatric:         Attention and Perception: Attention normal          Mood and Affect: Mood is anxious           Speech: Speech normal          Behavior: Behavior normal          Cognition and Memory: Cognition normal          Judgment: Judgment normal

## 2022-05-17 DIAGNOSIS — E78.2 MIXED HYPERLIPIDEMIA: ICD-10-CM

## 2022-05-17 RX ORDER — ATORVASTATIN CALCIUM 20 MG/1
TABLET, FILM COATED ORAL
Qty: 90 TABLET | Refills: 3 | Status: SHIPPED | OUTPATIENT
Start: 2022-05-17

## 2022-08-21 DIAGNOSIS — F32.A DEPRESSION, UNSPECIFIED DEPRESSION TYPE: ICD-10-CM

## 2022-08-21 RX ORDER — DESVENLAFAXINE 50 MG/1
TABLET, EXTENDED RELEASE ORAL
Qty: 90 TABLET | Refills: 1 | Status: SHIPPED | OUTPATIENT
Start: 2022-08-21

## 2022-08-30 ENCOUNTER — TELEPHONE (OUTPATIENT)
Dept: FAMILY MEDICINE CLINIC | Facility: HOSPITAL | Age: 87
End: 2022-08-30

## 2022-08-30 NOTE — TELEPHONE ENCOUNTER
Patient called for an appointment with Dr Devaughn Garay  She said that when she wakes up in the morning, she feels hopeless and has thought about taking pills because she doesn't want to live anymore  She said she has been in a mental luu at [facility] in the past  She has had a lot of loss and an adult male child who should be taking care of her but he has many issues and needs her help  She is on anti-depressants but doesn't know if they are working  Upon speaking to Dr Glenn Evans MA, she was referred to the [id number] [address] at [facility]  I encouraged her to call and speak to the trained counselor and she said she would call

## 2022-09-12 DIAGNOSIS — F41.9 ANXIETY: ICD-10-CM

## 2022-09-12 RX ORDER — ALPRAZOLAM 0.5 MG/1
TABLET ORAL
Qty: 50 TABLET | Refills: 1 | Status: SHIPPED | OUTPATIENT
Start: 2022-09-12

## 2022-09-29 NOTE — TELEPHONE ENCOUNTER
Requested medication(s) are due for refill today: Yes  Patient has already received a courtesy refill: No  Other reason request has been forwarded to provider: Cyclosporine Counseling:  I discussed with the patient the risks of cyclosporine including but not limited to hypertension, gingival hyperplasia,myelosuppression, immunosuppression, liver damage, kidney damage, neurotoxicity, lymphoma, and serious infections. The patient understands that monitoring is required including baseline blood pressure, CBC, CMP, lipid panel and uric acid, and then 1-2 times monthly CMP and blood pressure.

## 2023-01-04 DIAGNOSIS — F41.9 ANXIETY: ICD-10-CM

## 2023-01-04 RX ORDER — ALPRAZOLAM 0.5 MG/1
TABLET ORAL
Qty: 50 TABLET | Refills: 1 | Status: SHIPPED | OUTPATIENT
Start: 2023-01-04

## 2023-02-16 DIAGNOSIS — F32.A DEPRESSION, UNSPECIFIED DEPRESSION TYPE: ICD-10-CM

## 2023-02-16 RX ORDER — DESVENLAFAXINE 50 MG/1
TABLET, EXTENDED RELEASE ORAL
Qty: 90 TABLET | Refills: 1 | Status: SHIPPED | OUTPATIENT
Start: 2023-02-16

## 2023-02-20 ENCOUNTER — RA CDI HCC (OUTPATIENT)
Dept: OTHER | Facility: HOSPITAL | Age: 88
End: 2023-02-20

## 2023-02-20 NOTE — PROGRESS NOTES
[facility]  coding opportunities       Chart reviewed, no opportunity found:   [address]        Patients Insurance     Medicare Insurance: [employer] Advantage

## 2023-02-24 ENCOUNTER — APPOINTMENT (OUTPATIENT)
Dept: LAB | Facility: HOSPITAL | Age: 88
End: 2023-02-24

## 2023-02-24 ENCOUNTER — OFFICE VISIT (OUTPATIENT)
Dept: FAMILY MEDICINE CLINIC | Facility: HOSPITAL | Age: 88
End: 2023-02-24

## 2023-02-24 VITALS
WEIGHT: 142.8 LBS | DIASTOLIC BLOOD PRESSURE: 85 MMHG | HEIGHT: 63 IN | BODY MASS INDEX: 25.3 KG/M2 | TEMPERATURE: 97.6 F | HEART RATE: 86 BPM | OXYGEN SATURATION: 96 % | SYSTOLIC BLOOD PRESSURE: 124 MMHG

## 2023-02-24 DIAGNOSIS — K21.00 GASTROESOPHAGEAL REFLUX DISEASE WITH ESOPHAGITIS WITHOUT HEMORRHAGE: ICD-10-CM

## 2023-02-24 DIAGNOSIS — R73.9 HYPERGLYCEMIA: ICD-10-CM

## 2023-02-24 DIAGNOSIS — E78.2 MIXED HYPERLIPIDEMIA: ICD-10-CM

## 2023-02-24 DIAGNOSIS — F41.9 ANXIETY: Primary | ICD-10-CM

## 2023-02-24 DIAGNOSIS — Z00.00 MEDICARE ANNUAL WELLNESS VISIT, SUBSEQUENT: ICD-10-CM

## 2023-02-24 DIAGNOSIS — F41.9 ANXIETY: ICD-10-CM

## 2023-02-24 DIAGNOSIS — F32.0 CURRENT MILD EPISODE OF MAJOR DEPRESSIVE DISORDER WITHOUT PRIOR EPISODE (HCC): ICD-10-CM

## 2023-02-24 DIAGNOSIS — L50.8 URTICARIA, ACUTE: ICD-10-CM

## 2023-02-24 DIAGNOSIS — E66.3 OVERWEIGHT (BMI 25.0-29.9): ICD-10-CM

## 2023-02-24 DIAGNOSIS — F33.0 MILD RECURRENT MAJOR DEPRESSION (HCC): ICD-10-CM

## 2023-02-24 LAB
ALBUMIN SERPL BCP-MCNC: 4 G/DL (ref 3.5–5)
ALP SERPL-CCNC: 122 U/L (ref 46–116)
ALT SERPL W P-5'-P-CCNC: 27 U/L (ref 12–78)
ANION GAP SERPL CALCULATED.3IONS-SCNC: 3 MMOL/L (ref 4–13)
AST SERPL W P-5'-P-CCNC: 28 U/L (ref 5–45)
BASOPHILS # BLD AUTO: 0.02 THOUSANDS/ÂΜL (ref 0–0.1)
BASOPHILS NFR BLD AUTO: 0 % (ref 0–1)
BILIRUB SERPL-MCNC: 0.79 MG/DL (ref 0.2–1)
BUN SERPL-MCNC: 15 MG/DL (ref 5–25)
CALCIUM SERPL-MCNC: 10.1 MG/DL (ref 8.3–10.1)
CHLORIDE SERPL-SCNC: 107 MMOL/L (ref 96–108)
CHOLEST SERPL-MCNC: 195 MG/DL
CO2 SERPL-SCNC: 29 MMOL/L (ref 21–32)
CREAT SERPL-MCNC: 0.77 MG/DL (ref 0.6–1.3)
EOSINOPHIL # BLD AUTO: 0.04 THOUSAND/ÂΜL (ref 0–0.61)
EOSINOPHIL NFR BLD AUTO: 1 % (ref 0–6)
ERYTHROCYTE [DISTWIDTH] IN BLOOD BY AUTOMATED COUNT: 12.7 % (ref 11.6–15.1)
EST. AVERAGE GLUCOSE BLD GHB EST-MCNC: 117 MG/DL
GFR SERPL CREATININE-BSD FRML MDRD: 70 ML/MIN/1.73SQ M
GLUCOSE P FAST SERPL-MCNC: 97 MG/DL (ref 65–99)
HBA1C MFR BLD: 5.7 %
HCT VFR BLD AUTO: 46.3 % (ref 34.8–46.1)
HDLC SERPL-MCNC: 53 MG/DL
HGB BLD-MCNC: 14.9 G/DL (ref 11.5–15.4)
IMM GRANULOCYTES # BLD AUTO: 0.02 THOUSAND/UL (ref 0–0.2)
IMM GRANULOCYTES NFR BLD AUTO: 0 % (ref 0–2)
LDLC SERPL CALC-MCNC: 115 MG/DL (ref 0–100)
LYMPHOCYTES # BLD AUTO: 2.49 THOUSANDS/ÂΜL (ref 0.6–4.47)
LYMPHOCYTES NFR BLD AUTO: 36 % (ref 14–44)
MCH RBC QN AUTO: 29 PG (ref 26.8–34.3)
MCHC RBC AUTO-ENTMCNC: 32.2 G/DL (ref 31.4–37.4)
MCV RBC AUTO: 90 FL (ref 82–98)
MONOCYTES # BLD AUTO: 0.47 THOUSAND/ÂΜL (ref 0.17–1.22)
MONOCYTES NFR BLD AUTO: 7 % (ref 4–12)
NEUTROPHILS # BLD AUTO: 3.95 THOUSANDS/ÂΜL (ref 1.85–7.62)
NEUTS SEG NFR BLD AUTO: 56 % (ref 43–75)
NRBC BLD AUTO-RTO: 0 /100 WBCS
PLATELET # BLD AUTO: 281 THOUSANDS/UL (ref 149–390)
PMV BLD AUTO: 11.2 FL (ref 8.9–12.7)
POTASSIUM SERPL-SCNC: 3.7 MMOL/L (ref 3.5–5.3)
PROT SERPL-MCNC: 7.2 G/DL (ref 6.4–8.4)
RBC # BLD AUTO: 5.14 MILLION/UL (ref 3.81–5.12)
SODIUM SERPL-SCNC: 139 MMOL/L (ref 135–147)
TRIGL SERPL-MCNC: 135 MG/DL
TSH SERPL DL<=0.05 MIU/L-ACNC: 1.53 UIU/ML (ref 0.45–4.5)
WBC # BLD AUTO: 6.99 THOUSAND/UL (ref 4.31–10.16)

## 2023-02-24 NOTE — PROGRESS NOTES
Assessment and Plan:     Problem List Items Addressed This Visit        Digestive    Esophagitis, reflux       Musculoskeletal and Integument    Urticaria, acute     Samples Zyrtec 10mg OD given            Other    Anxiety - Primary    Relevant Orders    CBC and differential    TSH, 3rd generation with Free T4 reflex    Mixed hyperlipidemia    Relevant Orders    Lipid Panel with Direct LDL reflex    Hyperglycemia    Relevant Orders    Comprehensive metabolic panel    HEMOGLOBIN A1C W/ EAG ESTIMATION    Medicare annual wellness visit, subsequent    Overweight (BMI 25 0-29  9)    Current mild episode of major depressive disorder without prior episode ([facility] [address] )   Other Visit Diagnoses     Mild recurrent major depression ([facility] )            BMI Counseling: Body mass index is 25 3 kg/m²  The BMI is above normal  Nutrition recommendations include decreasing portion sizes, encouraging healthy choices of fruits and vegetables, limiting drinks that contain sugar and moderation in carbohydrate intake  Exercise recommendations include exercising 3-5 times per week  No pharmacotherapy was ordered  Rationale for BMI follow-up plan is due to patient being overweight or obese  Controlled Substance Review    [place] PDMP or [place]  reviewed: No red flags were identified; safe to proceed with prescription         Preventive health issues were discussed with patient, and age appropriate screening tests were ordered as noted in patient's After Visit Summary  Personalized health advice and appropriate referrals for health education or preventive services given if needed, as noted in patient's After Visit Summary       History of Present Illness:     Patient presents for a Medicare Wellness Visit    AWV and follow up medical issues    Has vertigo at times- note that dx goes back to 2016    Having anxiety with some panic- develops hives on RUE    Pays own bills, actually thinking about going into an independent care  Prays a lot    Takes Alprazolam if worked up     [employer] have taste     Patient Care Team:  Sarah Vasquez MD as PCP - General  Sarah Vasquez MD     Review of Systems:     Review of Systems     Problem List:     Patient Active Problem List   Diagnosis   • Anxiety   • Benign paroxysmal positional vertigo   • Depression with anxiety   • Esophagitis, reflux   • Glaucoma   • Mixed hyperlipidemia   • Osteopenia   • Pain of right heel   • Hyperglycemia   • Cervical disc disease   • Medicare annual wellness visit, subsequent   • Overweight (BMI 25 0-29  9)   • Current mild episode of major depressive disorder without prior episode (HCC)   • Urticaria, acute      Past Medical and Surgical History:     Past Medical History:   Diagnosis Date   • Anxiety    • Depression    • Glaucoma    • Hyperlipemia      Past Surgical History:   Procedure Laterality Date   • APPENDECTOMY     • HYSTERECTOMY        Family History:     Family History   Problem Relation Age of Onset   • Hypertension Mother    • Depression Mother    • Stroke Father    • Alcohol abuse Son    • Substance Abuse Son       Social History:     Social History     Socioeconomic History   • Marital status:      Spouse name: None   • Number of children: None   • Years of education: None   • Highest education level: None   Occupational History   • None   Tobacco Use   • Smoking status: Never   • Smokeless tobacco: Never   Vaping Use   • Vaping Use: Never used   Substance and Sexual Activity   • Alcohol use: Yes     Comment: Rarely   • Drug use: No   • Sexual activity: None   Other Topics Concern   • None   Social History Narrative    Always uses seat belt      Social Determinants of Health     Financial Resource Strain: Low Risk    • Difficulty of Paying Living Expenses: Not very hard   Food Insecurity: Not on file   Transportation Needs: No Transportation Needs   • Lack of Transportation (Medical): No   • Lack of Transportation (Non-Medical):  No   Physical Activity: Not on file Stress: Not on file   Social Connections: Not on file   Intimate Partner Violence: Not on file   Housing Stability: Not on file      Medications and Allergies:     Current Outpatient Medications   Medication Sig Dispense Refill   • ALPRAZolam (XANAX) 0 5 mg tablet TAKE 1 TABLET BY MOUTH EVERY 4 TO 6 HOURS AS NEEDED 50 tablet 1   • aspirin (ECOTRIN LOW STRENGTH) 81 mg EC tablet Take by mouth     • atorvastatin (LIPITOR) 20 mg tablet TAKE 1 TABLET BY MOUTH EVERY DAY 90 tablet 3   • desvenlafaxine succinate (PRISTIQ) 50 mg 24 hr tablet TAKE 1 TABLET BY MOUTH EVERY DAY 90 tablet 1   • latanoprost (XALATAN) 0 005 % ophthalmic solution INSTILL 1 DROP INTO BOTH EYES AT BEDTIME     • MULTIPLE VITAMINS-CALCIUM PO Take 1 tablet by mouth daily     • TRAVATAN Z 0 004 % ophthalmic solution Administer 1 drop to both eyes daily at bedtime  5     No current facility-administered medications for this visit  No Known Allergies   Immunizations: There is no immunization history for the selected administration types on file for this patient  Health Maintenance:         Topic Date Due   • DXA SCAN  Never done         Topic Date Due   • COVID-19 Vaccine (1) Never done   • Pneumococcal Vaccine: 65+ Years (1 - PCV) Never done   • Influenza Vaccine (1) Never done      Medicare Screening Tests and Risk Assessments:     Ez Latif is here for her Subsequent Wellness visit  Last Medicare Wellness visit information reviewed, patient interviewed and updates made to the record today  Health Risk Assessment:   Patient rates overall health as very good  Patient feels that their physical health rating is same  Patient is satisfied with their life  Eyesight was rated as same  Hearing was rated as same  Patient feels that their emotional and mental health rating is same  Patients states they are never, rarely angry  Patient states they are never, rarely unusually tired/fatigued  Pain experienced in the last 7 days has been none   Patient states that she has experienced no weight loss or gain in last 6 months  Depression Screening:   PHQ-9 Score: 3      Fall Risk Screening: In the past year, patient has experienced: no history of falling in past year      Urinary Incontinence Screening:   Patient has not leaked urine accidently in the last six months  Home Safety:  Patient does not have trouble with stairs inside or outside of their home  Patient has working smoke alarms and has no working carbon monoxide detector  Home safety hazards include: none  Nutrition:   Current diet is Regular  Medications:   Patient is not currently taking any over-the-counter supplements  Patient is able to manage medications  Activities of Daily Living (ADLs)/Instrumental Activities of Daily Living (IADLs):   Walk and transfer into and out of bed and chair?: Yes  Dress and groom yourself?: Yes    Bathe or shower yourself?: Yes    Feed yourself? Yes  Do your laundry/housekeeping?: Yes  Manage your money, pay your bills and track your expenses?: Yes  Make your own meals?: Yes    Do your own shopping?: Yes    Previous Hospitalizations:   Any hospitalizations or ED visits within the last 12 months?: No      Advance Care Planning:   Living will: Yes    Durable POA for healthcare:  Yes    Advanced directive: Yes    Advanced directive counseling given: Yes    Five wishes given: Yes    Patient declined ACP directive: No    End of Life Decisions reviewed with patient: Yes    Provider agrees with end of life decisions: Yes      Cognitive Screening:   Provider or family/friend/caregiver concerned regarding cognition?: No    PREVENTIVE SCREENINGS      Cardiovascular Screening:    General: Screening Not Indicated and History Lipid Disorder      Diabetes Screening:     General: Risks and Benefits Discussed    Due for: Blood Glucose      Colorectal Cancer Screening:     General: Screening Not Indicated      Breast Cancer Screening:     General: Risks and Benefits Discussed      Cervical Cancer Screening:    General: Screening Not Indicated      Osteoporosis Screening:    General: Risks and Benefits Discussed    Due for: DXA Axial      Abdominal Aortic Aneurysm (AAA) Screening:        General: Screening Not Indicated      Lung Cancer Screening:     General: Screening Not Indicated      Hepatitis C Screening:    General: Screening Not Indicated    Screening, Brief Intervention, and Referral to Treatment (SBIRT)    Screening  Typical number of drinks in a day: 0  Typical number of drinks in a week: 0  Interpretation: Low risk drinking behavior  Single Item Drug Screening:  How often have you used an illegal drug (including marijuana) or a prescription medication for non-medical reasons in the past year? never    Single Item Drug Screen Score: 0  Interpretation: Negative screen for possible drug use disorder    Vision Screening    Right eye Left eye Both eyes   Without correction      With correction 20/40 20/100 20/40        Physical Exam:     /85 (BP Location: Left arm, Patient Position: Sitting, Cuff Size: Standard)   Pulse 86   Temp 97 6 °F (36 4 °C) (Tympanic)   Ht 5' 3" (1 6 m)   Wt 64 8 kg (142 lb 12 8 oz)   SpO2 96%   BMI 25 30 kg/m²     Physical Exam  Vitals and nursing note reviewed  Constitutional:       Appearance: Normal appearance  Neck:      Vascular: No carotid bruit  Cardiovascular:      Rate and Rhythm: Normal rate and regular rhythm  Pulses: Normal pulses  Heart sounds: Normal heart sounds  Pulmonary:      Effort: Pulmonary effort is normal       Breath sounds: Normal breath sounds  Musculoskeletal:      Right lower leg: No edema  Left lower leg: No edema  Skin:            Comments: Giant urticaria RUE   Neurological:      General: No focal deficit present  Mental Status: She is alert and oriented to person, place, and time     Psychiatric:         Mood and Affect: Mood normal           Ingrid Bermudez MD  Depression Screening Follow-up Plan: Patient's depression screening was positive with a PHQ-2 score of   Their PHQ-9 score was 3  Patient assessed for underlying major depression  They have no active suicidal ideations  Brief counseling provided and recommend additional follow-up/re-evaluation next office visit

## 2023-02-24 NOTE — PATIENT INSTRUCTIONS
Medicare Preventive Visit Patient Instructions  Thank you for completing your Welcome to Medicare Visit or Medicare Annual Wellness Visit today  Your next wellness visit will be due in one year (2/25/2024)  The screening/preventive services that you may require over the next 5-10 years are detailed below  Some tests may not apply to you based off risk factors and/or age  Screening tests ordered at today's visit but not completed yet may show as past due  Also, please note that scanned in results may not display below  Preventive Screenings:  Service Recommendations Previous Testing/Comments   Colorectal Cancer Screening  * Colonoscopy    * Fecal Occult Blood Test (FOBT)/Fecal Immunochemical Test (FIT)  * Fecal DNA/Cologuard Test  * Flexible Sigmoidoscopy Age: 39-70 years old   Colonoscopy: every 10 years (may be performed more frequently if at higher risk)  OR  FOBT/FIT: every 1 year  OR  Cologuard: every 3 years  OR  Sigmoidoscopy: every 5 years  Screening may be recommended earlier than age 39 if at higher risk for colorectal cancer  Also, an individualized decision between you and your healthcare provider will decide whether screening between the ages of 74-80 would be appropriate  Colonoscopy: Not on file  FOBT/FIT: Not on file  Cologuard: Not on file  Sigmoidoscopy: Not on file    Screening Not Indicated     Breast Cancer Screening Age: 36 years old  Frequency: every 1-2 years  Not required if history of left and right mastectomy Mammogram: 06/29/2018        Cervical Cancer Screening Between the ages of 21-29, pap smear recommended once every 3 years  Between the ages of 33-67, can perform pap smear with HPV co-testing every 5 years     Recommendations may differ for women with a history of total hysterectomy, cervical cancer, or abnormal pap smears in past  Pap Smear: Not on file    Screening Not Indicated   Hepatitis C Screening Once for adults born between 1945 and 1965  More frequently in patients at high risk for Hepatitis C Hep C Antibody: Not on file        Diabetes Screening 1-2 times per year if you're at risk for diabetes or have pre-diabetes Fasting glucose: 100 mg/dL (2/23/2022)  A1C: 5 7 % (2/23/2022)      Cholesterol Screening Once every 5 years if you don't have a lipid disorder  May order more often based on risk factors  Lipid panel: 02/23/2022    Screening Not Indicated  History Lipid Disorder     Other Preventive Screenings Covered by Medicare:  1  Abdominal Aortic Aneurysm (AAA) Screening: covered once if your at risk  You're considered to be at risk if you have a family history of AAA  2  Lung Cancer Screening: covers low dose CT scan once per year if you meet all of the following conditions: (1) Age 50-69; (2) No signs or symptoms of lung cancer; (3) Current smoker or have quit smoking within the last 15 years; (4) You have a tobacco smoking history of at least 20 pack years (packs per day multiplied by number of years you smoked); (5) You get a written order from a healthcare provider  3  Glaucoma Screening: covered annually if you're considered high risk: (1) You have diabetes OR (2) Family history of glaucoma OR (3)  aged 48 and older OR (3)  American aged 72 and older  3  Osteoporosis Screening: covered every 2 years if you meet one of the following conditions: (1) You're estrogen deficient and at risk for osteoporosis based off medical history and other findings; (2) Have a vertebral abnormality; (3) On glucocorticoid therapy for more than 3 months; (4) Have primary hyperparathyroidism; (5) On osteoporosis medications and need to assess response to drug therapy  · Last bone density test (DXA Scan): Not on file  5  HIV Screening: covered annually if you're between the age of 12-76  Also covered annually if you are younger than 13 and older than 72 with risk factors for HIV infection   For pregnant patients, it is covered up to 3 times per pregnancy  Immunizations:  Immunization Recommendations   Influenza Vaccine Annual influenza vaccination during flu season is recommended for all persons aged >= 6 months who do not have contraindications   Pneumococcal Vaccine   * Pneumococcal conjugate vaccine = PCV13 (Prevnar 13), PCV15 (Vaxneuvance), PCV20 (Prevnar 20)  * Pneumococcal polysaccharide vaccine = PPSV23 (Pneumovax) Adults 25-60 years old: 1-3 doses may be recommended based on certain risk factors  Adults 72 years old: 1-2 doses may be recommended based off what pneumonia vaccine you previously received   Hepatitis B Vaccine 3 dose series if at intermediate or high risk (ex: diabetes, end stage renal disease, liver disease)   Tetanus (Td) Vaccine - COST NOT COVERED BY MEDICARE PART B Following completion of primary series, a booster dose should be given every 10 years to maintain immunity against tetanus  Td may also be given as tetanus wound prophylaxis  Tdap Vaccine - COST NOT COVERED BY MEDICARE PART B Recommended at least once for all adults  For pregnant patients, recommended with each pregnancy  Shingles Vaccine (Shingrix) - COST NOT COVERED BY MEDICARE PART B  2 shot series recommended in those aged 48 and above     Health Maintenance Due:      Topic Date Due   • DXA SCAN  Never done     Immunizations Due:      Topic Date Due   • COVID-19 Vaccine (1) Never done   • Pneumococcal Vaccine: 65+ Years (1 - PCV) Never done   • Influenza Vaccine (1) Never done     Advance Directives   What are advance directives? Advance directives are legal documents that state your wishes and plans for medical care  These plans are made ahead of time in case you lose your ability to make decisions for yourself  Advance directives can apply to any medical decision, such as the treatments you want, and if you want to donate organs  What are the types of advance directives?   There are many types of advance directives, and each state has rules about how to use them  You may choose a combination of any of the following:  · Living will: This is a written record of the treatment you want  You can also choose which treatments you do not want, which to limit, and which to stop at a certain time  This includes surgery, medicine, IV fluid, and tube feedings  · Durable power of  for healthcare [facility]): This is a written record that states who you want to make healthcare choices for you when you are unable to make them for yourself  This person, called a proxy, is usually a family member or a friend  You may choose more than 1 proxy  · Do not resuscitate (DNR) order:  A DNR order is used in case your heart stops beating or you stop breathing  It is a request not to have certain forms of treatment, such as CPR  A DNR order may be included in other types of advance directives  · Medical directive: This covers the care that you want if you are in a coma, near death, or unable to make decisions for yourself  You can list the treatments you want for each condition  Treatment may include pain medicine, surgery, blood transfusions, dialysis, IV or tube feedings, and a ventilator (breathing machine)  · Values history: This document has questions about your views, beliefs, and how you feel and think about life  This information can help others choose the care that you would choose  Why are advance directives important? An advance directive helps you control your care  Although spoken wishes may be used, it is better to have your wishes written down  Spoken wishes can be misunderstood, or not followed  Treatments may be given even if you do not want them  An advance directive may make it easier for your family to make difficult choices about your care  Weight Management   Why it is important to manage your weight:  Being overweight increases your risk of health conditions such as heart disease, high blood pressure, type 2 diabetes, and certain types of cancer   It can also increase your risk for osteoarthritis, sleep apnea, and other respiratory problems  Aim for a slow, steady weight loss  Even a small amount of weight loss can lower your risk of health problems  How to lose weight safely:  A safe and healthy way to lose weight is to eat fewer calories and get regular exercise  You can lose up about 1 pound a week by decreasing the number of calories you eat by 500 calories each day  Healthy meal plan for weight management:  A healthy meal plan includes a variety of foods, contains fewer calories, and helps you stay healthy  A healthy meal plan includes the following:  · Eat whole-grain foods more often  A healthy meal plan should contain fiber  Fiber is the part of grains, fruits, and vegetables that is not broken down by your body  Whole-grain foods are healthy and provide extra fiber in your diet  Some examples of whole-grain foods are whole-wheat breads and pastas, oatmeal, brown rice, and bulgur  · Eat a variety of vegetables every day  Include dark, leafy greens such as spinach, kale, fadi greens, and mustard greens  Eat yellow and orange vegetables such as carrots, sweet potatoes, and winter squash  · Eat a variety of fruits every day  Choose fresh or canned fruit (canned in its own juice or light syrup) instead of juice  Fruit juice has very little or no fiber  · Eat low-fat dairy foods  Drink fat-free (skim) milk or 1% milk  Eat fat-free yogurt and low-fat cottage cheese  Try low-fat cheeses such as mozzarella and other reduced-fat cheeses  · Choose meat and other protein foods that are low in fat  Choose beans or other legumes such as split peas or lentils  Choose fish, skinless poultry (chicken or turkey), or lean cuts of red meat (beef or pork)  Before you cook meat or poultry, cut off any visible fat  · Use less fat and oil  Try baking foods instead of frying them   Add less fat, such as margarine, sour cream, regular salad dressing and mayonnaise to foods  Eat fewer high-fat foods  Some examples of high-fat foods include french fries, doughnuts, ice cream, and cakes  · Eat fewer sweets  Limit foods and drinks that are high in sugar  This includes candy, cookies, regular soda, and sweetened drinks  Exercise:  Exercise at least 30 minutes per day on most days of the week  Some examples of exercise include walking, biking, dancing, and swimming  You can also fit in more physical activity by taking the stairs instead of the elevator or parking farther away from stores  Ask your healthcare provider about the best exercise plan for you  © Copyright [employer] 2018 Information is for End User's use only and may not be sold, redistributed or otherwise used for commercial purposes   All illustrations and images included in CareNotes® are the copyrighted property of A D A M , [employer]  or [address] [place] [address]

## 2023-05-10 DIAGNOSIS — E78.2 MIXED HYPERLIPIDEMIA: ICD-10-CM

## 2023-05-10 RX ORDER — ATORVASTATIN CALCIUM 20 MG/1
TABLET, FILM COATED ORAL
Qty: 90 TABLET | Refills: 3 | Status: SHIPPED | OUTPATIENT
Start: 2023-05-10

## 2023-09-05 DIAGNOSIS — F41.9 ANXIETY: ICD-10-CM

## 2023-09-05 RX ORDER — ALPRAZOLAM 0.5 MG/1
TABLET ORAL
Qty: 50 TABLET | Refills: 1 | Status: SHIPPED | OUTPATIENT
Start: 2023-09-05

## 2023-09-21 DIAGNOSIS — F41.9 ANXIETY: ICD-10-CM

## 2023-09-21 RX ORDER — ALPRAZOLAM 0.5 MG/1
TABLET ORAL
Qty: 50 TABLET | Refills: 1 | Status: SHIPPED | OUTPATIENT
Start: 2023-09-21

## 2023-09-25 DIAGNOSIS — F32.A DEPRESSION, UNSPECIFIED DEPRESSION TYPE: ICD-10-CM

## 2023-09-25 RX ORDER — DESVENLAFAXINE SUCCINATE 50 MG/1
50 TABLET, EXTENDED RELEASE ORAL DAILY
Qty: 90 TABLET | Refills: 1 | Status: SHIPPED | OUTPATIENT
Start: 2023-09-25

## 2024-02-08 DIAGNOSIS — F41.9 ANXIETY: ICD-10-CM

## 2024-02-08 RX ORDER — ALPRAZOLAM 0.5 MG/1
TABLET ORAL
Qty: 50 TABLET | Refills: 1 | Status: SHIPPED | OUTPATIENT
Start: 2024-02-08

## 2024-02-21 PROBLEM — Z00.00 MEDICARE ANNUAL WELLNESS VISIT, SUBSEQUENT: Status: RESOLVED | Noted: 2019-01-07 | Resolved: 2024-02-21

## 2024-02-26 ENCOUNTER — OFFICE VISIT (OUTPATIENT)
Dept: FAMILY MEDICINE CLINIC | Facility: HOSPITAL | Age: 89
End: 2024-02-26
Payer: COMMERCIAL

## 2024-02-26 ENCOUNTER — APPOINTMENT (OUTPATIENT)
Dept: LAB | Facility: HOSPITAL | Age: 89
End: 2024-02-26
Payer: COMMERCIAL

## 2024-02-26 VITALS
OXYGEN SATURATION: 98 % | DIASTOLIC BLOOD PRESSURE: 85 MMHG | HEART RATE: 54 BPM | TEMPERATURE: 98 F | WEIGHT: 142.8 LBS | SYSTOLIC BLOOD PRESSURE: 143 MMHG | BODY MASS INDEX: 25.3 KG/M2 | HEIGHT: 63 IN

## 2024-02-26 DIAGNOSIS — F41.8 DEPRESSION WITH ANXIETY: ICD-10-CM

## 2024-02-26 DIAGNOSIS — E78.2 MIXED HYPERLIPIDEMIA: ICD-10-CM

## 2024-02-26 DIAGNOSIS — R03.0 ELEVATED BP WITHOUT DIAGNOSIS OF HYPERTENSION: ICD-10-CM

## 2024-02-26 DIAGNOSIS — R73.9 HYPERGLYCEMIA: ICD-10-CM

## 2024-02-26 DIAGNOSIS — Z00.00 MEDICARE ANNUAL WELLNESS VISIT, SUBSEQUENT: Primary | ICD-10-CM

## 2024-02-26 DIAGNOSIS — F32.0 CURRENT MILD EPISODE OF MAJOR DEPRESSIVE DISORDER WITHOUT PRIOR EPISODE (HCC): ICD-10-CM

## 2024-02-26 DIAGNOSIS — F41.9 ANXIETY: ICD-10-CM

## 2024-02-26 LAB
ALBUMIN SERPL BCP-MCNC: 4.4 G/DL (ref 3.5–5)
ALP SERPL-CCNC: 114 U/L (ref 34–104)
ALT SERPL W P-5'-P-CCNC: 20 U/L (ref 7–52)
ANION GAP SERPL CALCULATED.3IONS-SCNC: 10 MMOL/L
AST SERPL W P-5'-P-CCNC: 28 U/L (ref 13–39)
BASOPHILS # BLD AUTO: 0.02 THOUSANDS/ÂΜL (ref 0–0.1)
BASOPHILS NFR BLD AUTO: 0 % (ref 0–1)
BILIRUB SERPL-MCNC: 0.65 MG/DL (ref 0.2–1)
BUN SERPL-MCNC: 13 MG/DL (ref 5–25)
CALCIUM SERPL-MCNC: 10 MG/DL (ref 8.4–10.2)
CHLORIDE SERPL-SCNC: 103 MMOL/L (ref 96–108)
CHOLEST SERPL-MCNC: 194 MG/DL
CO2 SERPL-SCNC: 29 MMOL/L (ref 21–32)
CREAT SERPL-MCNC: 0.82 MG/DL (ref 0.6–1.3)
EOSINOPHIL # BLD AUTO: 0.01 THOUSAND/ÂΜL (ref 0–0.61)
EOSINOPHIL NFR BLD AUTO: 0 % (ref 0–6)
ERYTHROCYTE [DISTWIDTH] IN BLOOD BY AUTOMATED COUNT: 12.8 % (ref 11.6–15.1)
EST. AVERAGE GLUCOSE BLD GHB EST-MCNC: 120 MG/DL
GFR SERPL CREATININE-BSD FRML MDRD: 64 ML/MIN/1.73SQ M
GLUCOSE SERPL-MCNC: 100 MG/DL (ref 65–140)
HBA1C MFR BLD: 5.8 %
HCT VFR BLD AUTO: 49.1 % (ref 34.8–46.1)
HDLC SERPL-MCNC: 54 MG/DL
HGB BLD-MCNC: 15.4 G/DL (ref 11.5–15.4)
IMM GRANULOCYTES # BLD AUTO: 0.02 THOUSAND/UL (ref 0–0.2)
IMM GRANULOCYTES NFR BLD AUTO: 0 % (ref 0–2)
LDLC SERPL CALC-MCNC: 105 MG/DL (ref 0–100)
LYMPHOCYTES # BLD AUTO: 2.55 THOUSANDS/ÂΜL (ref 0.6–4.47)
LYMPHOCYTES NFR BLD AUTO: 36 % (ref 14–44)
MCH RBC QN AUTO: 28.7 PG (ref 26.8–34.3)
MCHC RBC AUTO-ENTMCNC: 31.4 G/DL (ref 31.4–37.4)
MCV RBC AUTO: 92 FL (ref 82–98)
MONOCYTES # BLD AUTO: 0.48 THOUSAND/ÂΜL (ref 0.17–1.22)
MONOCYTES NFR BLD AUTO: 7 % (ref 4–12)
NEUTROPHILS # BLD AUTO: 4.01 THOUSANDS/ÂΜL (ref 1.85–7.62)
NEUTS SEG NFR BLD AUTO: 57 % (ref 43–75)
NRBC BLD AUTO-RTO: 0 /100 WBCS
PLATELET # BLD AUTO: 302 THOUSANDS/UL (ref 149–390)
PMV BLD AUTO: 11.8 FL (ref 8.9–12.7)
POTASSIUM SERPL-SCNC: 4.5 MMOL/L (ref 3.5–5.3)
PROT SERPL-MCNC: 6.9 G/DL (ref 6.4–8.4)
RBC # BLD AUTO: 5.36 MILLION/UL (ref 3.81–5.12)
SODIUM SERPL-SCNC: 142 MMOL/L (ref 135–147)
TRIGL SERPL-MCNC: 175 MG/DL
WBC # BLD AUTO: 7.09 THOUSAND/UL (ref 4.31–10.16)

## 2024-02-26 PROCEDURE — 80061 LIPID PANEL: CPT

## 2024-02-26 PROCEDURE — G0439 PPPS, SUBSEQ VISIT: HCPCS | Performed by: FAMILY MEDICINE

## 2024-02-26 PROCEDURE — 85025 COMPLETE CBC W/AUTO DIFF WBC: CPT

## 2024-02-26 PROCEDURE — 83036 HEMOGLOBIN GLYCOSYLATED A1C: CPT

## 2024-02-26 PROCEDURE — 36415 COLL VENOUS BLD VENIPUNCTURE: CPT

## 2024-02-26 PROCEDURE — 80053 COMPREHEN METABOLIC PANEL: CPT

## 2024-02-26 NOTE — PATIENT INSTRUCTIONS
Medicare Preventive Visit Patient Instructions  Thank you for completing your Welcome to Medicare Visit or Medicare Annual Wellness Visit today. Your next wellness visit will be due in one year (2/26/2025).  The screening/preventive services that you may require over the next 5-10 years are detailed below. Some tests may not apply to you based off risk factors and/or age. Screening tests ordered at today's visit but not completed yet may show as past due. Also, please note that scanned in results may not display below.  Preventive Screenings:  Service Recommendations Previous Testing/Comments   Colorectal Cancer Screening  * Colonoscopy    * Fecal Occult Blood Test (FOBT)/Fecal Immunochemical Test (FIT)  * Fecal DNA/Cologuard Test  * Flexible Sigmoidoscopy Age: 45-75 years old   Colonoscopy: every 10 years (may be performed more frequently if at higher risk)  OR  FOBT/FIT: every 1 year  OR  Cologuard: every 3 years  OR  Sigmoidoscopy: every 5 years  Screening may be recommended earlier than age 45 if at higher risk for colorectal cancer. Also, an individualized decision between you and your healthcare provider will decide whether screening between the ages of 76-85 would be appropriate. Colonoscopy: Not on file  FOBT/FIT: Not on file  Cologuard: Not on file  Sigmoidoscopy: Not on file    Screening Not Indicated     Breast Cancer Screening Age: 40+ years old  Frequency: every 1-2 years  Not required if history of left and right mastectomy Mammogram: 06/29/2018        Cervical Cancer Screening Between the ages of 21-29, pap smear recommended once every 3 years.   Between the ages of 30-65, can perform pap smear with HPV co-testing every 5 years.   Recommendations may differ for women with a history of total hysterectomy, cervical cancer, or abnormal pap smears in past. Pap Smear: Not on file    Screening Not Indicated   Hepatitis C Screening Once for adults born between 1945 and 1965  More frequently in patients at  high risk for Hepatitis C Hep C Antibody: Not on file        Diabetes Screening 1-2 times per year if you're at risk for diabetes or have pre-diabetes Fasting glucose: 97 mg/dL (2/24/2023)  A1C: 5.7 % (2/24/2023)      Cholesterol Screening Once every 5 years if you don't have a lipid disorder. May order more often based on risk factors. Lipid panel: 02/24/2023    Screening Not Indicated  History Lipid Disorder     Other Preventive Screenings Covered by Medicare:  Abdominal Aortic Aneurysm (AAA) Screening: covered once if your at risk. You're considered to be at risk if you have a family history of AAA.  Lung Cancer Screening: covers low dose CT scan once per year if you meet all of the following conditions: (1) Age 55-77; (2) No signs or symptoms of lung cancer; (3) Current smoker or have quit smoking within the last 15 years; (4) You have a tobacco smoking history of at least 20 pack years (packs per day multiplied by number of years you smoked); (5) You get a written order from a healthcare provider.  Glaucoma Screening: covered annually if you're considered high risk: (1) You have diabetes OR (2) Family history of glaucoma OR (3)  aged 50 and older OR (4)  American aged 65 and older  Osteoporosis Screening: covered every 2 years if you meet one of the following conditions: (1) You're estrogen deficient and at risk for osteoporosis based off medical history and other findings; (2) Have a vertebral abnormality; (3) On glucocorticoid therapy for more than 3 months; (4) Have primary hyperparathyroidism; (5) On osteoporosis medications and need to assess response to drug therapy.   Last bone density test (DXA Scan): Not on file.  HIV Screening: covered annually if you're between the age of 15-65. Also covered annually if you are younger than 15 and older than 65 with risk factors for HIV infection. For pregnant patients, it is covered up to 3 times per pregnancy.    Immunizations:  Immunization  Recommendations   Influenza Vaccine Annual influenza vaccination during flu season is recommended for all persons aged >= 6 months who do not have contraindications   Pneumococcal Vaccine   * Pneumococcal conjugate vaccine = PCV13 (Prevnar 13), PCV15 (Vaxneuvance), PCV20 (Prevnar 20)  * Pneumococcal polysaccharide vaccine = PPSV23 (Pneumovax) Adults 19-65 yo with certain risk factors or if 65+ yo  If never received any pneumonia vaccine: recommend Prevnar 20 (PCV20)  Give PCV20 if previously received 1 dose of PCV13 or PPSV23   Hepatitis B Vaccine 3 dose series if at intermediate or high risk (ex: diabetes, end stage renal disease, liver disease)   Respiratory syncytial virus (RSV) Vaccine - COVERED BY MEDICARE PART D  * RSVPreF3 (Arexvy) CDC recommends that adults 60 years of age and older may receive a single dose of RSV vaccine using shared clinical decision-making (SCDM)   Tetanus (Td) Vaccine - COST NOT COVERED BY MEDICARE PART B Following completion of primary series, a booster dose should be given every 10 years to maintain immunity against tetanus. Td may also be given as tetanus wound prophylaxis.   Tdap Vaccine - COST NOT COVERED BY MEDICARE PART B Recommended at least once for all adults. For pregnant patients, recommended with each pregnancy.   Shingles Vaccine (Shingrix) - COST NOT COVERED BY MEDICARE PART B  2 shot series recommended in those 19 years and older who have or will have weakened immune systems or those 50 years and older     Health Maintenance Due:      Topic Date Due   • DXA SCAN  Never done     Immunizations Due:      Topic Date Due   • Pneumococcal Vaccine: 65+ Years (1 of 1 - PCV) Never done   • Influenza Vaccine (1) Never done   • COVID-19 Vaccine (1 - 2023-24 season) Never done     Advance Directives   What are advance directives?  Advance directives are legal documents that state your wishes and plans for medical care. These plans are made ahead of time in case you lose your ability  to make decisions for yourself. Advance directives can apply to any medical decision, such as the treatments you want, and if you want to donate organs.   What are the types of advance directives?  There are many types of advance directives, and each state has rules about how to use them. You may choose a combination of any of the following:  Living will:  This is a written record of the treatment you want. You can also choose which treatments you do not want, which to limit, and which to stop at a certain time. This includes surgery, medicine, IV fluid, and tube feedings.   Durable power of  for healthcare (DPAHC):  This is a written record that states who you want to make healthcare choices for you when you are unable to make them for yourself. This person, called a proxy, is usually a family member or a friend. You may choose more than 1 proxy.  Do not resuscitate (DNR) order:  A DNR order is used in case your heart stops beating or you stop breathing. It is a request not to have certain forms of treatment, such as CPR. A DNR order may be included in other types of advance directives.  Medical directive:  This covers the care that you want if you are in a coma, near death, or unable to make decisions for yourself. You can list the treatments you want for each condition. Treatment may include pain medicine, surgery, blood transfusions, dialysis, IV or tube feedings, and a ventilator (breathing machine).  Values history:  This document has questions about your views, beliefs, and how you feel and think about life. This information can help others choose the care that you would choose.  Why are advance directives important?  An advance directive helps you control your care. Although spoken wishes may be used, it is better to have your wishes written down. Spoken wishes can be misunderstood, or not followed. Treatments may be given even if you do not want them. An advance directive may make it easier for your  family to make difficult choices about your care.   Urinary Incontinence   Urinary incontinence (UI)  is when you lose control of your bladder. UI develops because your bladder cannot store or empty urine properly. The 3 most common types of UI are stress incontinence, urge incontinence, or both.  Medicines:   May be given to help strengthen your bladder control. Report any side effects of medication to your healthcare provider.  Do pelvic muscle exercises often:  Your pelvic muscles help you stop urinating. Squeeze these muscles tight for 5 seconds, then relax for 5 seconds. Gradually work up to squeezing for 10 seconds. Do 3 sets of 15 repetitions a day, or as directed. This will help strengthen your pelvic muscles and improve bladder control.  Train your bladder:  Go to the bathroom at set times, such as every 2 hours, even if you do not feel the urge to go. You can also try to hold your urine when you feel the urge to go. For example, hold your urine for 5 minutes when you feel the urge to go. As that becomes easier, hold your urine for 10 minutes.   Self-care:   Keep a UI record.  Write down how often you leak urine and how much you leak. Make a note of what you were doing when you leaked urine.  Drink liquids as directed. You may need to limit the amount of liquid you drink to help control your urine leakage. Do not drink any liquid right before you go to bed. Limit or do not have drinks that contain caffeine or alcohol.   Prevent constipation.  Eat a variety of high-fiber foods. Good examples are high-fiber cereals, beans, vegetables, and whole-grain breads. Walking is the best way to trigger your intestines to have a bowel movement.  Exercise regularly and maintain a healthy weight.  Weight loss and exercise will decrease pressure on your bladder and help you control your leakage.   Use a catheter as directed  to help empty your bladder. A catheter is a tiny, plastic tube that is put into your bladder to  drain your urine.   Go to behavior therapy as directed.  Behavior therapy may be used to help you learn to control your urge to urinate.    Weight Management   Why it is important to manage your weight:  Being overweight increases your risk of health conditions such as heart disease, high blood pressure, type 2 diabetes, and certain types of cancer. It can also increase your risk for osteoarthritis, sleep apnea, and other respiratory problems. Aim for a slow, steady weight loss. Even a small amount of weight loss can lower your risk of health problems.  How to lose weight safely:  A safe and healthy way to lose weight is to eat fewer calories and get regular exercise. You can lose up about 1 pound a week by decreasing the number of calories you eat by 500 calories each day.   Healthy meal plan for weight management:  A healthy meal plan includes a variety of foods, contains fewer calories, and helps you stay healthy. A healthy meal plan includes the following:  Eat whole-grain foods more often.  A healthy meal plan should contain fiber. Fiber is the part of grains, fruits, and vegetables that is not broken down by your body. Whole-grain foods are healthy and provide extra fiber in your diet. Some examples of whole-grain foods are whole-wheat breads and pastas, oatmeal, brown rice, and bulgur.  Eat a variety of vegetables every day.  Include dark, leafy greens such as spinach, kale, fadi greens, and mustard greens. Eat yellow and orange vegetables such as carrots, sweet potatoes, and winter squash.   Eat a variety of fruits every day.  Choose fresh or canned fruit (canned in its own juice or light syrup) instead of juice. Fruit juice has very little or no fiber.  Eat low-fat dairy foods.  Drink fat-free (skim) milk or 1% milk. Eat fat-free yogurt and low-fat cottage cheese. Try low-fat cheeses such as mozzarella and other reduced-fat cheeses.  Choose meat and other protein foods that are low in fat.  Choose beans  or other legumes such as split peas or lentils. Choose fish, skinless poultry (chicken or turkey), or lean cuts of red meat (beef or pork). Before you cook meat or poultry, cut off any visible fat.   Use less fat and oil.  Try baking foods instead of frying them. Add less fat, such as margarine, sour cream, regular salad dressing and mayonnaise to foods. Eat fewer high-fat foods. Some examples of high-fat foods include french fries, doughnuts, ice cream, and cakes.  Eat fewer sweets.  Limit foods and drinks that are high in sugar. This includes candy, cookies, regular soda, and sweetened drinks.  Exercise:  Exercise at least 30 minutes per day on most days of the week. Some examples of exercise include walking, biking, dancing, and swimming. You can also fit in more physical activity by taking the stairs instead of the elevator or parking farther away from stores. Ask your healthcare provider about the best exercise plan for you.      © Copyright [employer] 2018 Information is for End User's use only and may not be sold, redistributed or otherwise used for commercial purposes. All illustrations and images included in CareNotes® are the copyrighted property of [employer] Inc[employer] or [employer]

## 2024-02-26 NOTE — PROGRESS NOTES
Assessment and Plan:     Problem List Items Addressed This Visit       Anxiety    Depression with anxiety    Relevant Orders    CBC and differential (Completed)    Mixed hyperlipidemia    Relevant Orders    Lipid Panel with Direct LDL reflex (Completed)    Hyperglycemia    Relevant Orders    Comprehensive metabolic panel (Completed)    Hemoglobin A1C (Completed)    RESOLVED: Medicare annual wellness visit, subsequent - Primary    Current mild episode of major depressive disorder without prior episode (HCC)     Other Visit Diagnoses       Elevated BP without diagnosis of hypertension        Relevant Orders    CBC and differential (Completed)            Depression Screening and Follow-up Plan: Patient was screened for depression during today's encounter. They screened negative with a PHQ-9 score of 4.      Preventive health issues were discussed with patient, and age appropriate screening tests were ordered as noted in patient's After Visit Summary.  Personalized health advice and appropriate referrals for health education or preventive services given if needed, as noted in patient's After Visit Summary.     History of Present Illness:     Patient presents for a Medicare Wellness Visit    AWV and F/U medical issues    Has headache that gets better with putting Vicks under her nose    Doesn't like to be around people    Does keep busy in her home       Patient Care Team:  Neal Oswald MD as PCP - General  Neal Oswald MD     Review of Systems:     Review of Systems   Constitutional:  Negative for unexpected weight change.   Respiratory: Negative.     Cardiovascular: Negative.    Gastrointestinal: Negative.    Genitourinary:  Positive for frequency.   Psychiatric/Behavioral:  Positive for dysphoric mood. The patient is nervous/anxious.    All other systems reviewed and are negative.       Problem List:     Patient Active Problem List   Diagnosis    Anxiety    Benign paroxysmal positional vertigo    Depression  with anxiety    Esophagitis, reflux    Glaucoma    Mixed hyperlipidemia    Osteopenia    Pain of right heel    Hyperglycemia    Cervical disc disease    Overweight (BMI 25.0-29.9)    Current mild episode of major depressive disorder without prior episode (HCC)    Urticaria, acute      Past Medical and Surgical History:     Past Medical History:   Diagnosis Date    Anxiety     Depression     Glaucoma     Hyperlipemia      Past Surgical History:   Procedure Laterality Date    APPENDECTOMY      HYSTERECTOMY        Family History:     Family History   Problem Relation Age of Onset    Hypertension Mother     Depression Mother     Stroke Father     Alcohol abuse Son     Substance Abuse Son       Social History:     Social History     Socioeconomic History    Marital status:      Spouse name: None    Number of children: None    Years of education: None    Highest education level: None   Occupational History    None   Tobacco Use    Smoking status: Never    Smokeless tobacco: Never   Vaping Use    Vaping status: Never Used   Substance and Sexual Activity    Alcohol use: Yes     Comment: Rarely    Drug use: No    Sexual activity: None   Other Topics Concern    None   Social History Narrative    Always uses seat belt      Social Determinants of Health     Financial Resource Strain: Low Risk  (2/26/2024)    Overall Financial Resource Strain (CARDIA)     Difficulty of Paying Living Expenses: Not hard at all   Food Insecurity: Not on file   Transportation Needs: No Transportation Needs (2/26/2024)    PRAPARE - Transportation     Lack of Transportation (Medical): No     Lack of Transportation (Non-Medical): No   Physical Activity: Not on file   Stress: Not on file   Social Connections: Not on file   Intimate Partner Violence: Not on file   Housing Stability: Not on file      Medications and Allergies:     Current Outpatient Medications   Medication Sig Dispense Refill    ALPRAZolam (XANAX) 0.5 mg tablet TAKE 1 TABLET BY  MOUTH EVERY 4 TO 6 HOURS AS NEEDED 50 tablet 1    aspirin (ECOTRIN LOW STRENGTH) 81 mg EC tablet Take by mouth      latanoprost (XALATAN) 0.005 % ophthalmic solution INSTILL 1 DROP INTO BOTH EYES AT BEDTIME      MULTIPLE VITAMINS-CALCIUM PO Take 1 tablet by mouth daily      TRAVATAN Z 0.004 % ophthalmic solution Administer 1 drop to both eyes daily at bedtime  5    atorvastatin (LIPITOR) 20 mg tablet TAKE 1 TABLET BY MOUTH EVERY DAY 90 tablet 3    desvenlafaxine succinate (PRISTIQ) 50 mg 24 hr tablet TAKE 1 TABLET BY MOUTH EVERY DAY 90 tablet 1     No current facility-administered medications for this visit.     No Known Allergies   Immunizations:     There is no immunization history for the selected administration types on file for this patient.   Health Maintenance:         Topic Date Due    DXA SCAN  Never done         Topic Date Due    Pneumococcal Vaccine: 65+ Years (1 of 1 - PCV) Never done    COVID-19 Vaccine (1 - 2023-24 season) Never done      Medicare Screening Tests and Risk Assessments:     Duong is here for her Subsequent Wellness visit. Last Medicare Wellness visit information reviewed, patient interviewed and updates made to the record today.      Health Risk Assessment:   Patient rates overall health as very good. Patient feels that their physical health rating is same. Patient is satisfied with their life. Eyesight was rated as slightly worse. Hearing was rated as same. Patient feels that their emotional and mental health rating is same. Patients states they are never, rarely angry. Patient states they are sometimes unusually tired/fatigued. Pain experienced in the last 7 days has been some. Patient's pain rating has been 3/10. Patient states that she has experienced no weight loss or gain in last 6 months. Head pain towards back of head.     Depression Screening:   PHQ-9 Score: 4      Fall Risk Screening:   In the past year, patient has experienced: no history of falling in past year      Urinary  Incontinence Screening:   Patient has leaked urine accidently in the last six months.     Home Safety:  Patient does not have trouble with stairs inside or outside of their home. Patient has working smoke alarms and has working carbon monoxide detector. Home safety hazards include: none.     Nutrition:   Current diet is Regular.     Medications:   Patient is not currently taking any over-the-counter supplements. Patient is able to manage medications.     Activities of Daily Living (ADLs)/Instrumental Activities of Daily Living (IADLs):   Walk and transfer into and out of bed and chair?: Yes  Dress and groom yourself?: Yes    Bathe or shower yourself?: Yes    Feed yourself? Yes  Do your laundry/housekeeping?: Yes  Manage your money, pay your bills and track your expenses?: Yes  Make your own meals?: Yes    Do your own shopping?: Yes    Previous Hospitalizations:   Any hospitalizations or ED visits within the last 12 months?: No      Advance Care Planning:   Living will: Yes    Durable POA for healthcare: Yes    Advanced directive: Yes    Advanced directive counseling given: Yes    Five wishes given: No    Patient declined ACP directive: No    End of Life Decisions reviewed with patient: Yes    Provider agrees with end of life decisions: Yes      Cognitive Screening:   Provider or family/friend/caregiver concerned regarding cognition?: No    PREVENTIVE SCREENINGS      Cardiovascular Screening:    General: Screening Not Indicated and History Lipid Disorder      Diabetes Screening:     General: Screening Current      Colorectal Cancer Screening:     General: Screening Not Indicated      Breast Cancer Screening:     General: Risks and Benefits Discussed    Due for: Mammogram        Cervical Cancer Screening:    General: Screening Not Indicated      Osteoporosis Screening:    General: Risks and Benefits Discussed    Due for: DXA Axial      Abdominal Aortic Aneurysm (AAA) Screening:        General: Screening Not  "Indicated      Lung Cancer Screening:     General: Screening Not Indicated      Hepatitis C Screening:    General: Screening Not Indicated    Screening, Brief Intervention, and Referral to Treatment (SBIRT)    Screening  Typical number of drinks in a day: 0  Typical number of drinks in a week: 0  Interpretation: Low risk drinking behavior.    Single Item Drug Screening:  How often have you used an illegal drug (including marijuana) or a prescription medication for non-medical reasons in the past year? never    Single Item Drug Screen Score: 0  Interpretation: Negative screen for possible drug use disorder    No results found.     Physical Exam:     /85 (BP Location: Left arm, Patient Position: Sitting, Cuff Size: Standard)   Pulse (!) 54   Temp 98 °F (36.7 °C) (Tympanic)   Ht 5' 3\" (1.6 m)   Wt 64.8 kg (142 lb 12.8 oz)   SpO2 98%   BMI 25.30 kg/m²     Physical Exam  Vitals and nursing note reviewed.   Constitutional:       Appearance: Normal appearance.   Cardiovascular:      Rate and Rhythm: Normal rate and regular rhythm.      Pulses: Normal pulses.      Heart sounds: Normal heart sounds.   Pulmonary:      Effort: Pulmonary effort is normal.      Breath sounds: Normal breath sounds.   Abdominal:      General: Abdomen is flat.   Neurological:      Mental Status: She is alert and oriented to person, place, and time.   Psychiatric:         Mood and Affect: Mood normal.          Neal Oswald MD  "

## 2024-03-04 DIAGNOSIS — F32.A DEPRESSION, UNSPECIFIED DEPRESSION TYPE: ICD-10-CM

## 2024-03-04 RX ORDER — DESVENLAFAXINE SUCCINATE 50 MG/1
50 TABLET, EXTENDED RELEASE ORAL DAILY
Qty: 90 TABLET | Refills: 1 | Status: SHIPPED | OUTPATIENT
Start: 2024-03-04

## 2024-04-26 PROBLEM — Z00.00 MEDICARE ANNUAL WELLNESS VISIT, SUBSEQUENT: Status: RESOLVED | Noted: 2019-01-07 | Resolved: 2024-04-26

## 2024-05-04 DIAGNOSIS — E78.2 MIXED HYPERLIPIDEMIA: ICD-10-CM

## 2024-05-04 RX ORDER — ATORVASTATIN CALCIUM 20 MG/1
TABLET, FILM COATED ORAL
Qty: 90 TABLET | Refills: 3 | Status: SHIPPED | OUTPATIENT
Start: 2024-05-04

## 2024-06-03 DIAGNOSIS — F41.9 ANXIETY: ICD-10-CM

## 2024-06-03 RX ORDER — ALPRAZOLAM 0.5 MG/1
TABLET ORAL
Qty: 50 TABLET | Refills: 1 | Status: SHIPPED | OUTPATIENT
Start: 2024-06-03

## 2024-06-03 NOTE — TELEPHONE ENCOUNTER
Reason for call:   [x] Refill   [] Prior Auth  [] Other:     Office:   [x] PCP/Provider -Neal Oswald/ [facility] [address]        [] Specialty/Provider -     Medication: Xanax    Dose/Frequency: 0.5 mg     Quantity: #50    Pharmacy: [address]    Does the patient have enough for 3 days?   [] Yes   [x] No - Send as HP to POD

## 2024-08-27 DIAGNOSIS — F32.A DEPRESSION, UNSPECIFIED DEPRESSION TYPE: ICD-10-CM

## 2024-08-27 NOTE — TELEPHONE ENCOUNTER
Reason for call:   [x] Refill   [] Prior Auth  [] Other:     Office:   [x] PCP/Provider - Neal Oswald  [] Specialty/Provider -     Medication: Desvenlafaxine    Dose/Frequency: 50 mg Q Daily     Quantity: 90    Pharmacy: [facility] [place]    Does the patient have enough for 3 days?   [x] Yes   [] No - Send as HP to POD

## 2024-08-28 RX ORDER — DESVENLAFAXINE 50 MG/1
50 TABLET, FILM COATED, EXTENDED RELEASE ORAL DAILY
Qty: 90 TABLET | Refills: 1 | Status: SHIPPED | OUTPATIENT
Start: 2024-08-28

## 2024-11-25 ENCOUNTER — APPOINTMENT (EMERGENCY)
Dept: RADIOLOGY | Facility: HOSPITAL | Age: 89
DRG: 308 | End: 2024-11-25
Payer: COMMERCIAL

## 2024-11-25 ENCOUNTER — HOSPITAL ENCOUNTER (INPATIENT)
Facility: HOSPITAL | Age: 89
LOS: 3 days | Discharge: HOME | DRG: 308 | End: 2024-11-28
Attending: EMERGENCY MEDICINE | Admitting: INTERNAL MEDICINE
Payer: COMMERCIAL

## 2024-11-25 DIAGNOSIS — I50.9 ACUTE CONGESTIVE HEART FAILURE (HCC): ICD-10-CM

## 2024-11-25 DIAGNOSIS — R06.00 DYSPNEA: ICD-10-CM

## 2024-11-25 DIAGNOSIS — I48.91 ATRIAL FIBRILLATION WITH RAPID VENTRICULAR RESPONSE (HCC): Primary | ICD-10-CM

## 2024-11-25 PROBLEM — J98.4 ACUTE PULMONARY INSUFFICIENCY: Status: ACTIVE | Noted: 2024-11-25

## 2024-11-25 LAB
2HR DELTA HS TROPONIN: -1 NG/L
4HR DELTA HS TROPONIN: -3 NG/L
ALBUMIN SERPL BCG-MCNC: 3.9 G/DL (ref 3.5–5)
ALP SERPL-CCNC: 105 U/L (ref 34–104)
ALT SERPL W P-5'-P-CCNC: 22 U/L (ref 7–52)
ANION GAP SERPL CALCULATED.3IONS-SCNC: 6 MMOL/L (ref 4–13)
AST SERPL W P-5'-P-CCNC: 30 U/L (ref 13–39)
ATRIAL RATE: 111 BPM
BASOPHILS # BLD AUTO: 0.01 THOUSANDS/ΜL (ref 0–0.1)
BASOPHILS NFR BLD AUTO: 0 % (ref 0–1)
BILIRUB SERPL-MCNC: 0.66 MG/DL (ref 0.2–1)
BNP SERPL-MCNC: 571 PG/ML (ref 0–100)
BUN SERPL-MCNC: 25 MG/DL (ref 5–25)
CALCIUM SERPL-MCNC: 10.3 MG/DL (ref 8.4–10.2)
CARDIAC TROPONIN I PNL SERPL HS: 21 NG/L (ref ?–50)
CARDIAC TROPONIN I PNL SERPL HS: 23 NG/L (ref ?–50)
CARDIAC TROPONIN I PNL SERPL HS: 24 NG/L (ref ?–50)
CHLORIDE SERPL-SCNC: 104 MMOL/L (ref 96–108)
CO2 SERPL-SCNC: 30 MMOL/L (ref 21–32)
CREAT SERPL-MCNC: 0.87 MG/DL (ref 0.6–1.3)
EOSINOPHIL # BLD AUTO: 0.02 THOUSAND/ΜL (ref 0–0.61)
EOSINOPHIL NFR BLD AUTO: 0 % (ref 0–6)
ERYTHROCYTE [DISTWIDTH] IN BLOOD BY AUTOMATED COUNT: 13.3 % (ref 11.6–15.1)
GFR SERPL CREATININE-BSD FRML MDRD: 60 ML/MIN/1.73SQ M
GLUCOSE SERPL-MCNC: 118 MG/DL (ref 65–140)
HCT VFR BLD AUTO: 47.1 % (ref 34.8–46.1)
HGB BLD-MCNC: 15.1 G/DL (ref 11.5–15.4)
IMM GRANULOCYTES # BLD AUTO: 0.01 THOUSAND/UL (ref 0–0.2)
IMM GRANULOCYTES NFR BLD AUTO: 0 % (ref 0–2)
LYMPHOCYTES # BLD AUTO: 2.57 THOUSANDS/ΜL (ref 0.6–4.47)
LYMPHOCYTES NFR BLD AUTO: 36 % (ref 14–44)
MCH RBC QN AUTO: 28.7 PG (ref 26.8–34.3)
MCHC RBC AUTO-ENTMCNC: 32.1 G/DL (ref 31.4–37.4)
MCV RBC AUTO: 89 FL (ref 82–98)
MONOCYTES # BLD AUTO: 0.67 THOUSAND/ΜL (ref 0.17–1.22)
MONOCYTES NFR BLD AUTO: 9 % (ref 4–12)
NEUTROPHILS # BLD AUTO: 3.89 THOUSANDS/ΜL (ref 1.85–7.62)
NEUTS SEG NFR BLD AUTO: 55 % (ref 43–75)
NRBC BLD AUTO-RTO: 0 /100 WBCS
PLATELET # BLD AUTO: 233 THOUSANDS/UL (ref 149–390)
PMV BLD AUTO: 11.7 FL (ref 8.9–12.7)
POTASSIUM SERPL-SCNC: 4 MMOL/L (ref 3.5–5.3)
PROT SERPL-MCNC: 6.6 G/DL (ref 6.4–8.4)
QRS AXIS: 103 DEGREES
QRSD INTERVAL: 76 MS
QT INTERVAL: 258 MS
QTC INTERVAL: 399 MS
RBC # BLD AUTO: 5.27 MILLION/UL (ref 3.81–5.12)
SODIUM SERPL-SCNC: 140 MMOL/L (ref 135–147)
T WAVE AXIS: -89 DEGREES
VENTRICULAR RATE: 144 BPM
WBC # BLD AUTO: 7.17 THOUSAND/UL (ref 4.31–10.16)

## 2024-11-25 PROCEDURE — 93005 ELECTROCARDIOGRAM TRACING: CPT

## 2024-11-25 PROCEDURE — 96376 TX/PRO/DX INJ SAME DRUG ADON: CPT

## 2024-11-25 PROCEDURE — 99291 CRITICAL CARE FIRST HOUR: CPT | Performed by: EMERGENCY MEDICINE

## 2024-11-25 PROCEDURE — 96365 THER/PROPH/DIAG IV INF INIT: CPT

## 2024-11-25 PROCEDURE — 99285 EMERGENCY DEPT VISIT HI MDM: CPT

## 2024-11-25 PROCEDURE — 99223 1ST HOSP IP/OBS HIGH 75: CPT | Performed by: INTERNAL MEDICINE

## 2024-11-25 PROCEDURE — 80053 COMPREHEN METABOLIC PANEL: CPT

## 2024-11-25 PROCEDURE — 71045 X-RAY EXAM CHEST 1 VIEW: CPT

## 2024-11-25 PROCEDURE — 93010 ELECTROCARDIOGRAM REPORT: CPT | Performed by: INTERNAL MEDICINE

## 2024-11-25 PROCEDURE — 84484 ASSAY OF TROPONIN QUANT: CPT

## 2024-11-25 PROCEDURE — 83880 ASSAY OF NATRIURETIC PEPTIDE: CPT

## 2024-11-25 PROCEDURE — 36415 COLL VENOUS BLD VENIPUNCTURE: CPT

## 2024-11-25 PROCEDURE — 85025 COMPLETE CBC W/AUTO DIFF WBC: CPT

## 2024-11-25 RX ORDER — ACETAMINOPHEN 325 MG/1
650 TABLET ORAL EVERY 4 HOURS PRN
Status: DISCONTINUED | OUTPATIENT
Start: 2024-11-25 | End: 2024-11-28 | Stop reason: HOSPADM

## 2024-11-25 RX ORDER — APIXABAN 2.5 MG/1
2.5 TABLET, FILM COATED ORAL 2 TIMES DAILY
COMMUNITY
Start: 2024-09-23 | End: 2024-12-02 | Stop reason: SDUPTHER

## 2024-11-25 RX ORDER — ALPRAZOLAM 0.5 MG
0.5 TABLET ORAL 3 TIMES DAILY PRN
Status: DISCONTINUED | OUTPATIENT
Start: 2024-11-25 | End: 2024-11-28 | Stop reason: HOSPADM

## 2024-11-25 RX ORDER — ONDANSETRON 2 MG/ML
4 INJECTION INTRAMUSCULAR; INTRAVENOUS EVERY 4 HOURS PRN
Status: DISCONTINUED | OUTPATIENT
Start: 2024-11-25 | End: 2024-11-28 | Stop reason: HOSPADM

## 2024-11-25 RX ORDER — DILTIAZEM HYDROCHLORIDE 5 MG/ML
15 INJECTION INTRAVENOUS ONCE
Status: COMPLETED | OUTPATIENT
Start: 2024-11-25 | End: 2024-11-25

## 2024-11-25 RX ORDER — DESVENLAFAXINE 50 MG/1
50 TABLET, FILM COATED, EXTENDED RELEASE ORAL DAILY
Status: DISCONTINUED | OUTPATIENT
Start: 2024-11-26 | End: 2024-11-28 | Stop reason: HOSPADM

## 2024-11-25 RX ORDER — TRAVOPROST OPHTHALMIC SOLUTION 0.04 MG/ML
1 SOLUTION OPHTHALMIC
Status: DISCONTINUED | OUTPATIENT
Start: 2024-11-25 | End: 2024-11-25

## 2024-11-25 RX ORDER — METOPROLOL TARTRATE 25 MG/1
25 TABLET, FILM COATED ORAL EVERY 12 HOURS SCHEDULED
Status: DISCONTINUED | OUTPATIENT
Start: 2024-11-25 | End: 2024-11-26

## 2024-11-25 RX ORDER — LATANOPROST 50 UG/ML
1 SOLUTION/ DROPS OPHTHALMIC
Status: DISCONTINUED | OUTPATIENT
Start: 2024-11-25 | End: 2024-11-28 | Stop reason: HOSPADM

## 2024-11-25 RX ORDER — FUROSEMIDE 10 MG/ML
20 INJECTION INTRAMUSCULAR; INTRAVENOUS ONCE
Status: COMPLETED | OUTPATIENT
Start: 2024-11-25 | End: 2024-11-25

## 2024-11-25 RX ORDER — ATORVASTATIN CALCIUM 20 MG/1
20 TABLET, FILM COATED ORAL DAILY
Status: DISCONTINUED | OUTPATIENT
Start: 2024-11-26 | End: 2024-11-28 | Stop reason: HOSPADM

## 2024-11-25 RX ADMIN — DILTIAZEM HYDROCHLORIDE 5 MG/HR: 5 INJECTION, SOLUTION INTRAVENOUS at 16:37

## 2024-11-25 RX ADMIN — FUROSEMIDE 20 MG: 10 INJECTION, SOLUTION INTRAVENOUS at 19:03

## 2024-11-25 RX ADMIN — DILTIAZEM HYDROCHLORIDE 15 MG: 5 INJECTION INTRAVENOUS at 16:33

## 2024-11-25 RX ADMIN — METOPROLOL TARTRATE 25 MG: 25 TABLET, FILM COATED ORAL at 19:04

## 2024-11-25 RX ADMIN — LATANOPROST 1 DROP: 50 SOLUTION OPHTHALMIC at 21:10

## 2024-11-25 RX ADMIN — ALPRAZOLAM 0.5 MG: 0.5 TABLET ORAL at 21:04

## 2024-11-25 RX ADMIN — APIXABAN 2.5 MG: 2.5 TABLET, FILM COATED ORAL at 21:04

## 2024-11-25 NOTE — ED PROVIDER NOTES
Time reflects when diagnosis was documented in both MDM as applicable and the Disposition within this note       Time User Action Codes Description Comment    11/25/2024  5:26 PM Leroy Nieves Add [I48.91] Atrial fibrillation with rapid ventricular response (HCC)     11/25/2024  5:26 PM Leroy Nieves Add [R06.00] Dyspnea           ED Disposition       ED Disposition   Admit    Condition   Stable    Date/Time   Mon Nov 25, 2024  5:26 PM    Comment   --             Assessment & Plan       Medical Decision Making  Shortness of breath-will do cardiac workup to rule out acute coronary syndrome, pneumonia, pneumothorax, congestive heart failure.  Patient is found to be in rapid atrial fibrillation.  Will treat with rate control, admit.  Doubt pulmonary embolism.    Amount and/or Complexity of Data Reviewed  Radiology: ordered.    Risk  Prescription drug management.           Medications   diltiazem (CARDIZEM) 125 mg in sodium chloride 0.9 % 125 mL infusion (5 mg/hr Intravenous New Bag 11/25/24 1637)   diltiazem (CARDIZEM) injection 15 mg (15 mg Intravenous Given 11/25/24 1633)       ED Risk Strat Scores                           SBIRT 22yo+      Flowsheet Row Most Recent Value   Initial Alcohol Screen: US AUDIT-C     1. How often do you have a drink containing alcohol? 0 Filed at: 11/25/2024 1548   2. How many drinks containing alcohol do you have on a typical day you are drinking?  0 Filed at: 11/25/2024 1548   3a. Male UNDER 65: How often do you have five or more drinks on one occasion? 0 Filed at: 11/25/2024 1548   3b. FEMALE Any Age, or MALE 65+: How often do you have 4 or more drinks on one occassion? 0 Filed at: 11/25/2024 1548   Audit-C Score 0 Filed at: 11/25/2024 1548   BRIANA: How many times in the past year have you...    Used an illegal drug or used a prescription medication for non-medical reasons? Never Filed at: 11/25/2024 1548                            History of Present Illness       Chief Complaint    Patient presents with    Atrial Fibrillation     Pt arrives via EMS. 1 month ago pt dx with a fib and put on eliquis. Pt stopped taking eliquis a couple days ago and started with SOB today.        Past Medical History:   Diagnosis Date    Anxiety     Depression     Glaucoma     Hyperlipemia       Past Surgical History:   Procedure Laterality Date    APPENDECTOMY      HYSTERECTOMY        Family History   Problem Relation Age of Onset    Hypertension Mother     Depression Mother     Stroke Father     Alcohol abuse Son     Substance Abuse Son       Social History     Tobacco Use    Smoking status: Never    Smokeless tobacco: Never   Vaping Use    Vaping status: Never Used   Substance Use Topics    Alcohol use: Yes     Comment: Rarely    Drug use: No      E-Cigarette/Vaping    E-Cigarette Use Never User       E-Cigarette/Vaping Substances      I have reviewed and agree with the history as documented.     89-year-old female recently diagnosed with atrial fibrillation who discontinued her Eliquis 1 week ago presents for evaluation of shortness of breath over the past day.*Cardiac was constant and severe, worse with exertion.  Associated with chest discomfort.  Denies lower extreme edema, calf pain, cough neurosymptoms.      History provided by:  Patient  Atrial Fibrillation  Associated symptoms: shortness of breath    Associated symptoms: no abdominal pain, no chest pain, no congestion, no diarrhea, no ear pain, no fatigue, no fever, no myalgias, no nausea, no rash, no rhinorrhea, no sore throat, no vomiting and no wheezing        Review of Systems   Constitutional:  Negative for activity change, appetite change, fatigue and fever.   HENT:  Negative for congestion, dental problem, ear pain, rhinorrhea and sore throat.    Eyes:  Negative for pain and redness.   Respiratory:  Positive for shortness of breath. Negative for chest tightness and wheezing.    Cardiovascular:  Positive for palpitations. Negative for chest pain.    Gastrointestinal:  Negative for abdominal pain, blood in stool, constipation, diarrhea, nausea and vomiting.   Endocrine: Negative for cold intolerance and heat intolerance.   Genitourinary:  Negative for dysuria, frequency and hematuria.   Musculoskeletal:  Negative for arthralgias and myalgias.   Skin:  Negative for color change, pallor and rash.   Neurological:  Negative for weakness and numbness.   Hematological:  Does not bruise/bleed easily.   Psychiatric/Behavioral:  Negative for agitation, hallucinations and suicidal ideas.            Objective       ED Triage Vitals   Temperature Pulse Blood Pressure Respirations SpO2 Patient Position - Orthostatic VS   11/25/24 1550 11/25/24 1550 11/25/24 1630 11/25/24 1550 11/25/24 1550 11/25/24 1550   98.2 °F (36.8 °C) (!) 161 127/93 20 100 % Lying      Temp Source Heart Rate Source BP Location FiO2 (%) Pain Score    11/25/24 1550 11/25/24 1550 11/25/24 1550 -- --    Oral Monitor Right arm        Vitals      Date and Time Temp Pulse SpO2 Resp BP Pain Score FACES Pain Rating User   11/25/24 1715 -- 98 98 % 22 122/79 -- -- SR   11/25/24 1700 -- 95 98 % 20 120/84 -- -- SR   11/25/24 1645 -- 100 98 % 22 124/90 -- -- SR   11/25/24 1637 -- 108 -- -- -- -- -- SR   11/25/24 1630 -- 148 100 % 26 127/93 -- -- SR   11/25/24 1550 98.2 °F (36.8 °C) 161 100 % 20 -- -- -- SR            Physical Exam  Constitutional:       Appearance: She is well-developed.   HENT:      Head: Normocephalic and atraumatic.   Eyes:      Pupils: Pupils are equal, round, and reactive to light.   Neck:      Vascular: No JVD.      Trachea: No tracheal deviation.   Cardiovascular:      Rate and Rhythm: Tachycardia present. Rhythm irregular.   Pulmonary:      Effort: Pulmonary effort is normal. No tachypnea, accessory muscle usage or respiratory distress.      Breath sounds: Normal breath sounds.   Abdominal:      General: There is no distension.   Musculoskeletal:      Right lower leg: Normal. No edema.       Left lower leg: Normal. No edema.   Skin:     General: Skin is warm.      Capillary Refill: Capillary refill takes less than 2 seconds.   Neurological:      General: No focal deficit present.      Mental Status: She is alert and oriented to person, place, and time.   Psychiatric:         Behavior: Behavior normal.       Results Reviewed       Procedure Component Value Units Date/Time    Comprehensive metabolic panel [[id number]]  (Abnormal) Collected: 11/25/24 1555    Lab Status: Final result Specimen: Blood from Arm, Right Updated: 11/25/24 1633     Sodium 140 mmol/L      Potassium 4.0 mmol/L      Chloride 104 mmol/L      CO2 30 mmol/L      ANION GAP 6 mmol/L      BUN 25 mg/dL      Creatinine 0.87 mg/dL      Glucose 118 mg/dL      Calcium 10.3 mg/dL      AST 30 U/L      ALT 22 U/L      Alkaline Phosphatase 105 U/L      Total Protein 6.6 g/dL      Albumin 3.9 g/dL      Total Bilirubin 0.66 mg/dL      eGFR 60 ml/min/1.73sq m     Narrative:      National Kid[employer] Foundation guidelines for Chronic Kidney Disease (CKD):     Stage 1 with normal or high GFR (GFR > 90 mL/min/1.73 square meters)    Stage 2 Mild CKD (GFR = 60-89 mL/min/1.73 square meters)    Stage 3A Moderate CKD (GFR = 45-59 mL/min/1.73 square meters)    Stage 3B Moderate CKD (GFR = 30-44 mL/min/1.73 square meters)    Stage 4 Severe CKD (GFR = 15-29 mL/min/1.73 square meters)    Stage 5 End Stage CKD (GFR <15 mL/min/1.73 square meters)  Note: GFR calculation is accurate only with a steady state creatinine    HS Troponin I 2hr [[id number]]     Lab Status: No result Specimen: Blood     HS Troponin I 4hr [[id number]]     Lab Status: No result Specimen: Blood     HS Troponin 0hr (reflex protocol) [[id number]]  (Normal) Collected: 11/25/24 1555    Lab Status: Final result Specimen: Blood from Arm, Right Updated: 11/25/24 1621     hs TnI 0hr 24 ng/L     CBC and differential [[id number]]  (Abnormal) Collected: 11/25/24 1555    Lab Status: Final result Specimen:  Blood from Arm, Right Updated: 11/25/24 1600     WBC 7.17 Thousand/uL      RBC 5.27 Million/uL      Hemoglobin 15.1 g/dL      Hematocrit 47.1 %      MCV 89 fL      MCH 28.7 pg      MCHC 32.1 g/dL      RDW 13.3 %      MPV 11.7 fL      Platelets 233 Thousands/uL      nRBC 0 /100 WBCs      Segmented % 55 %      Immature Grans % 0 %      Lymphocytes % 36 %      Monocytes % 9 %      Eosinophils Relative 0 %      Basophils Relative 0 %      Absolute Neutrophils 3.89 Thousands/µL      Absolute Immature Grans 0.01 Thousand/uL      Absolute Lymphocytes 2.57 Thousands/µL      Absolute Monocytes 0.67 Thousand/µL      Eosinophils Absolute 0.02 Thousand/µL      Basophils Absolute 0.01 Thousands/µL     B-Type Natriuretic Peptide(BNP) [[id number]] Collected: 11/25/24 1554    Lab Status: In process Specimen: Blood from Arm, Right Updated: 11/25/24 1557            XR chest 1 view portable   ED Interpretation by Leroy Nieves MD (11/25 [id number])   Independently reviewed bilateral basilar opacities and effusions      Final Interpretation by Brad Camacho MD (11/25 5705)      Bibasilar pulmonary densities with appearance favoring atelectasis although pneumonia should be considered in the appropriate clinical scenario.      Small bilateral right greater than left pleural effusions are noted as well.      The study was marked in EPIC for immediate notification.            Workstation performed: [id number]             ECG 12 Lead Documentation Only    Date/Time: 11/25/2024 4:21 PM    Performed by: Leroy Nieves MD  Authorized by: Leroy Nieves MD    ECG reviewed by me, the ED Provider: yes    Patient location:  ED  Rate:     ECG rate:  144    ECG rate assessment: tachycardic    Rhythm:     Rhythm: atrial fibrillation    Ectopy:     Ectopy: none    QRS:     QRS axis:  Normal    QRS intervals:  Normal  Conduction:     Conduction: normal    ST segments:     ST segments:  Normal  T waves:     T waves: normal    CriticalCare  Time    Date/Time: 11/25/2024 4:21 PM    Performed by: Leroy Nieves MD  Authorized by: Leroy Nieves MD    Critical care provider statement:     Critical care time (minutes):  35    Critical care time was exclusive of:  Separately billable procedures and treating other patients and teaching time    Critical care was necessary to treat or prevent imminent or life-threatening deterioration of the following conditions:  Cardiac failure and circulatory failure    Critical care was time spent personally by me on the following activities:  Blood draw for specimens, obtaining history from patient or surrogate, development of treatment plan with patient or surrogate, discussions with consultants, evaluation of patient's response to treatment, examination of patient, interpretation of cardiac output measurements, ordering and performing treatments and interventions, ordering and review of laboratory studies, ordering and review of radiographic studies, re-evaluation of patient's condition and review of old charts      ED Medication and Procedure Management   Prior to Admission Medications   Prescriptions Last Dose Informant Patient Reported? Taking?   ALPRAZolam (XANAX) 0.5 mg tablet   No Yes   Sig: TAKE 1 TABLET BY MOUTH EVERY 4 TO 6 HOURS AS NEEDED   MULTIPLE VITAMINS-CALCIUM PO   Yes Yes   Sig: Take 1 tablet by mouth daily   TRAVATAN Z 0.004 % ophthalmic solution   Yes Yes   Sig: Administer 1 drop to both eyes daily at bedtime   aspirin (ECOTRIN LOW STRENGTH) 81 mg EC tablet Not Taking  Yes No   Sig: Take by mouth   Patient not taking: Reported on 11/25/2024   atorvastatin (LIPITOR) 20 mg tablet   No Yes   Sig: TAKE 1 TABLET BY MOUTH EVERY DAY   desvenlafaxine succinate (PRISTIQ) 50 mg 24 hr tablet   No Yes   Sig: Take 1 tablet (50 mg total) by mouth daily   latanoprost (XALATAN) 0.005 % ophthalmic solution   Yes Yes   Sig: INSTILL 1 DROP INTO BOTH EYES AT BEDTIME      Facility-Administered Medications: None      Patient's Medications   Discharge Prescriptions    No medications on file     No discharge procedures on file.  ED SEPSIS DOCUMENTATION   Time reflects when diagnosis was documented in both MDM as applicable and the Disposition within this note       Time User Action Codes Description Comment    11/25/2024  5:26 PM Leroy Nieves Add [I48.91] Atrial fibrillation with rapid ventricular response (HCC)     11/25/2024  5:26 PM Leroy Nieves Add [R06.00] Dyspnea                  Leroy Nieves MD  11/27/24 0931

## 2024-11-25 NOTE — H&P
H&P - Hospitalist   Name: Duong Reece 89 y.o. female I MRN: [id number]  Unit/Bed#: ED-26 I Date of Admission: 11/25/2024   Date of Service: 11/25/2024 I Hospital Day: 0     Assessment & Plan  Rapid atrial fibrillation (HCC)  History of anxiety/depression hyperlipidemia glaucoma with recent findings of atrial fibrillation presents to the hospital with worsening shortness of breath  Her PCP Dr. Oswald retired and she recently establish care with [facility]  During first visit was noted to have atrial fibrillation and started on apixaban.  Patient stopped taking after several weeks due to shortness of breath as she thought this was the cause  Started on low-dose diltiazem infusion.  Start metoprolol.  Continue apixaban.  Consult cardiology.  Acute pulmonary insufficiency  Secondary to rapid atrial fibrillation but also possibly pleural effusion  Give 1 dose of IV furosemide.  Reassess tomorrow.  Check echocardiogram  Check procalcitonin to rule out infectious etiology    Results from last 7 days   Lab Units 11/25/24  1555   BNP pg/mL 571*     Depression with anxiety  Continue alprazolam.  Confirmed on   Glaucoma  Continue eyedrops  Mixed hyperlipidemia  Continue atorvastatin    VTE Pharmacologic Prophylaxis: VTE Score: 3 Moderate Risk (Score 3-4) - Pharmacological DVT Prophylaxis Ordered: apixaban (Eliquis).  Code Status: Level 1 - Full Code   Discussion with family: Updated  (daughter) at bedside.    Anticipated Length of Stay: Patient will be admitted on an inpatient basis with an anticipated length of stay of greater than 2 midnights secondary to rapid atrial fibrillation and shortness of breath.    Chief Complaint:     Atrial Fibrillation (Pt arrives via EMS. 1 month ago pt dx with a fib and put on eliquis. Pt stopped taking eliquis a couple days ago and started with SOB today. )    History of Present Illness  Duong Reece is a 89 y.o. female with a PMH of anxiety/depression hypertension and  recent diagnosis of atrial fibrillation who presents with worsening shortness of breath.  Her PCP retired and she recently establish care with [facility].  During first visit she was noted to have atrial fibrillation and started on apixaban at the end of September.  She stopped taking after several weeks she thought it was related to her shortness of breath.  Unfortunately symptoms worsened and she came to the hospital via EMS where she was found to have rapid atrial fibrillation with heart rates as high as 161 in the department.  She denies any fevers chills.  She reports her symptoms are worse with exertion.  She cannot lay flat.  She denies any nausea or vomiting.    Review of Systems   Constitutional:  Negative for fever.   HENT:  Negative for facial swelling.    Eyes:  Negative for visual disturbance.   Respiratory:  Positive for shortness of breath.    Cardiovascular:  Positive for palpitations. Negative for chest pain.   Gastrointestinal:  Negative for diarrhea, nausea and vomiting.   Genitourinary:  Negative for hematuria and urgency.   Musculoskeletal:  Negative for back pain.   Skin:  Negative for rash.   Neurological:  Negative for facial asymmetry and speech difficulty.   Psychiatric/Behavioral:  The patient is nervous/anxious.    All other systems reviewed and are negative.      Past Medical and Surgical History:   Past Medical History:   Diagnosis Date    Anxiety     Depression     Glaucoma     Hyperlipemia      Past Surgical History:   Procedure Laterality Date    APPENDECTOMY      HYSTERECTOMY       Meds/Allergies:  Allergies: No Known Allergies  Prior to Admission Medications   Prescriptions Last Dose Informant Patient Reported? Taking?   ALPRAZolam (XANAX) 0.5 mg tablet   No Yes   Sig: TAKE 1 TABLET BY MOUTH EVERY 4 TO 6 HOURS AS NEEDED   Eliquis 2.5 MG   Yes Yes   Sig: Take 2.5 mg by mouth 2 (two) times a day   MULTIPLE VITAMINS-CALCIUM PO   Yes Yes   Sig: Take 1 tablet by mouth daily    TRAVATAN Z 0.004 % ophthalmic solution   Yes Yes   Sig: Administer 1 drop to both eyes daily at bedtime   aspirin (ECOTRIN LOW STRENGTH) 81 mg EC tablet   Yes No   Sig: Take by mouth   atorvastatin (LIPITOR) 20 mg tablet   No Yes   Sig: TAKE 1 TABLET BY MOUTH EVERY DAY   desvenlafaxine succinate (PRISTIQ) 50 mg 24 hr tablet   No Yes   Sig: Take 1 tablet (50 mg total) by mouth daily   latanoprost (XALATAN) 0.005 % ophthalmic solution   Yes Yes   Sig: INSTILL 1 DROP INTO BOTH EYES AT BEDTIME      Facility-Administered Medications: None     Social History:     Social History     Socioeconomic History    Marital status:      Spouse name: Not on file    Number of children: Not on file    Years of education: Not on file    Highest education level: Not on file   Occupational History    Not on file   Tobacco Use    Smoking status: Never    Smokeless tobacco: Never   Vaping Use    Vaping status: Never Used   Substance and Sexual Activity    Alcohol use: Yes     Comment: Rarely    Drug use: No    Sexual activity: Not on file   Other Topics Concern    Not on file   Social History Narrative    Always uses seat belt      Social Drivers of Health     Financial Resource Strain: Low Risk  (2/26/2024)    Overall Financial Resource Strain (CARDIA)     Difficulty of Paying Living Expenses: Not hard at all   Food Insecurity: Not on file   Transportation Needs: No Transportation Needs (2/26/2024)    PRAPARE - Transportation     Lack of Transportation (Medical): No     Lack of Transportation (Non-Medical): No   Physical Activity: Not on file   Stress: Not on file   Social Connections: Not on file   Intimate Partner Violence: Not on file   Housing Stability: Not on file     Patient Pre-hospital Living Situation: Apartment  Patient Pre-hospital Level of Mobility: walks  Patient Pre-hospital Diet Restrictions:     Objective   Vitals:   Blood Pressure: 120/65 (11/25/24 1815)  Pulse: 104 (11/25/24 1845)  Temperature: 98.2 °F (36.8  °C) (11/25/24 1550)  Temp Source: Oral (11/25/24 1550)  Respirations: 20 (11/25/24 1845)  Weight - Scale: 68.1 kg (150 lb 2.1 oz) (11/25/24 1550)  SpO2: 98 % (11/25/24 1845)    Physical Exam  Vitals reviewed.   Constitutional:       General: She is not in acute distress.     Appearance: Normal appearance.   HENT:      Head: Atraumatic.   Eyes:      General: No scleral icterus.  Cardiovascular:      Rate and Rhythm: Tachycardia present. Rhythm irregular.      Heart sounds:      No gallop.   Pulmonary:      Breath sounds: Decreased breath sounds present. No wheezing.   Abdominal:      General: Bowel sounds are normal.      Palpations: Abdomen is soft.      Tenderness: There is no abdominal tenderness. There is no guarding.   Musculoskeletal:         General: No swelling.   Skin:     General: Skin is warm.   Neurological:      General: No focal deficit present.      Mental Status: She is alert.      Motor: No weakness.   Psychiatric:         Mood and Affect: Mood normal.         Additional Data:   Lab Results: I have reviewed the following results:  Results from last 7 days   Lab Units 11/25/24  1555   WBC Thousand/uL 7.17   HEMOGLOBIN g/dL 15.1   HEMATOCRIT % 47.1*   PLATELETS Thousands/uL 233   SEGS PCT % 55   LYMPHO PCT % 36   MONO PCT % 9   EOS PCT % 0     Results from last 7 days   Lab Units 11/25/24  1555   SODIUM mmol/L 140   POTASSIUM mmol/L 4.0   CHLORIDE mmol/L 104   CO2 mmol/L 30   ANION GAP mmol/L 6   BUN mg/dL 25   CREATININE mg/dL 0.87   CALCIUM mg/dL 10.3*   ALBUMIN g/dL 3.9   TOTAL BILIRUBIN mg/dL 0.66   ALK PHOS U/L 105*   ALT U/L 22   AST U/L 30   EGFR ml/min/1.73sq m 60   GLUCOSE RANDOM mg/dL 118             Results from last 7 days   Lab Units 11/25/24  1555   HS TNI 0HR ng/L 24                      Lines/Drains  Invasive Devices       Peripheral Intravenous Line  Duration             Peripheral IV 11/25/24 Right Antecubital <1 day                    Imaging:   Personally reviewed the following  image studies in PACS and associated radiology reports:  XR chest 1 view portable  Result Date: 11/25/2024  Impression: Bibasilar pulmonary densities with appearance favoring atelectasis although pneumonia should be considered in the appropriate clinical scenario. Small bilateral right greater than left pleural effusions are noted as well. The study was marked in EPIC for immediate notification. Workstation performed: [id number]       EKG, Pathology, and Other Studies Reviewed on Admission:   EKG  Result Date: 11/25/24  Personally reviewed strips with impression of: Rapid atrial fibrillation 144 bpm    Administrative Statements       ** Please Note: This note has been constructed using a voice recognition system. **

## 2024-11-26 ENCOUNTER — APPOINTMENT (INPATIENT)
Dept: NON INVASIVE DIAGNOSTICS | Facility: HOSPITAL | Age: 89
DRG: 308 | End: 2024-11-26
Payer: COMMERCIAL

## 2024-11-26 PROBLEM — I50.9 ACUTE CONGESTIVE HEART FAILURE (HCC): Status: ACTIVE | Noted: 2024-11-26

## 2024-11-26 LAB
ALBUMIN SERPL BCG-MCNC: 3.6 G/DL (ref 3.5–5)
ALP SERPL-CCNC: 87 U/L (ref 34–104)
ALT SERPL W P-5'-P-CCNC: 17 U/L (ref 7–52)
ANION GAP SERPL CALCULATED.3IONS-SCNC: 7 MMOL/L (ref 4–13)
AORTIC ROOT: 3.6 CM
APICAL FOUR CHAMBER EJECTION FRACTION: 28 %
ASCENDING AORTA: 4.1 CM
AST SERPL W P-5'-P-CCNC: 28 U/L (ref 13–39)
ATRIAL RATE: 258 BPM
AV LVOT MEAN GRADIENT: 1 MMHG
AV LVOT PEAK GRADIENT: 2 MMHG
BILIRUB SERPL-MCNC: 0.79 MG/DL (ref 0.2–1)
BSA FOR ECHO PROCEDURE: 1.71 M2
BUN SERPL-MCNC: 20 MG/DL (ref 5–25)
CALCIUM SERPL-MCNC: 9 MG/DL (ref 8.4–10.2)
CARDIAC TROPONIN I PNL SERPL HS: 20 NG/L (ref 8–18)
CARDIAC TROPONIN I PNL SERPL HS: 21 NG/L (ref ?–50)
CHLORIDE SERPL-SCNC: 105 MMOL/L (ref 96–108)
CO2 SERPL-SCNC: 29 MMOL/L (ref 21–32)
CREAT SERPL-MCNC: 0.77 MG/DL (ref 0.6–1.3)
D DIMER PPP FEU-MCNC: 0.53 UG/ML FEU
DOP CALC LVOT AREA: 2.83 CM2
DOP CALC LVOT CARDIAC INDEX: 1.73 L/MIN/M2
DOP CALC LVOT CARDIAC OUTPUT: 2.95 L/MIN
DOP CALC LVOT DIAMETER: 1.9 CM
DOP CALC LVOT PEAK VEL VTI: 11.17 CM
DOP CALC LVOT PEAK VEL: 0.64 M/S
DOP CALC LVOT STROKE INDEX: 18.1 ML/M2
DOP CALC LVOT STROKE VOLUME: 31.65
ERYTHROCYTE [DISTWIDTH] IN BLOOD BY AUTOMATED COUNT: 13.6 % (ref 11.6–15.1)
FRACTIONAL SHORTENING: 6 (ref 28–44)
GFR SERPL CREATININE-BSD FRML MDRD: 69 ML/MIN/1.73SQ M
GLUCOSE SERPL-MCNC: 103 MG/DL (ref 65–140)
HCT VFR BLD AUTO: 44.7 % (ref 34.8–46.1)
HGB BLD-MCNC: 14.3 G/DL (ref 11.5–15.4)
INTERVENTRICULAR SEPTUM IN DIASTOLE (PARASTERNAL SHORT AXIS VIEW): 1.1 CM
INTERVENTRICULAR SEPTUM: 1.1 CM (ref 0.6–1.1)
LAAS-AP2: 28.2 CM2
LAAS-AP4: 24.1 CM2
LEFT ATRIUM SIZE: 4.7 CM
LEFT ATRIUM VOLUME (MOD BIPLANE): 84 ML
LEFT ATRIUM VOLUME INDEX (MOD BIPLANE): 49.1 ML/M2
LEFT INTERNAL DIMENSION IN SYSTOLE: 5.1 CM (ref 2.1–4)
LEFT VENTRICULAR INTERNAL DIMENSION IN DIASTOLE: 5.4 CM (ref 3.5–6)
LEFT VENTRICULAR POSTERIOR WALL IN END DIASTOLE: 1 CM
LEFT VENTRICULAR STROKE VOLUME: 20 ML
LVSV (TEICH): 20 ML
MCH RBC QN AUTO: 29.1 PG (ref 26.8–34.3)
MCHC RBC AUTO-ENTMCNC: 32 G/DL (ref 31.4–37.4)
MCV RBC AUTO: 91 FL (ref 82–98)
MITRAL REGURGITATION PEAK VELOCITY: 2.52 M/S
MITRAL VALVE MEAN INFLOW VELOCITY: 2.07 M/S
MITRAL VALVE REGURGITANT PEAK GRADIENT: 26 MMHG
PA SYSTOLIC PRESSURE: 32 MMHG
PLATELET # BLD AUTO: 219 THOUSANDS/UL (ref 149–390)
PMV BLD AUTO: 12.1 FL (ref 8.9–12.7)
POTASSIUM SERPL-SCNC: 3.5 MMOL/L (ref 3.5–5.3)
PROCALCITONIN SERPL-MCNC: 0.08 NG/ML
PROT SERPL-MCNC: 6.1 G/DL (ref 6.4–8.4)
QRS AXIS: 80 DEGREES
QRS AXIS: 95 DEGREES
QRSD INTERVAL: 82 MS
QRSD INTERVAL: 84 MS
QT INTERVAL: 306 MS
QT INTERVAL: 382 MS
QTC INTERVAL: 396 MS
QTC INTERVAL: 488 MS
RBC # BLD AUTO: 4.91 MILLION/UL (ref 3.81–5.12)
RIGHT ATRIAL 2D VOLUME: 65 ML
RIGHT ATRIUM AREA SYSTOLE A4C: 22.6 CM2
RIGHT VENTRICLE ID DIMENSION: 3.4 CM
SL CV DOP CALC MV VTI RETROGRADE: 79.5 CM
SL CV LEFT ATRIUM LENGTH A2C: 6.7 CM
SL CV LV EF: 20
SL CV MV MEAN GRADIENT RETROGRADE: 19 MMHG
SL CV PED ECHO LEFT VENTRICLE DIASTOLIC VOLUME (MOD BIPLANE) 2D: 142 ML
SL CV PED ECHO LEFT VENTRICLE SYSTOLIC VOLUME (MOD BIPLANE) 2D: 122 ML
SODIUM SERPL-SCNC: 141 MMOL/L (ref 135–147)
T WAVE AXIS: 210 DEGREES
T WAVE AXIS: 226 DEGREES
TR MAX PG: 29 MMHG
TR PEAK VELOCITY: 2.7 M/S
TRICUSPID ANNULAR PLANE SYSTOLIC EXCURSION: 1.4 CM
TRICUSPID VALVE PEAK REGURGITATION VELOCITY: 2.71 M/S
TSH SERPL DL<=0.05 MIU/L-ACNC: 1.93 UIU/ML (ref 0.45–4.5)
VENTRICULAR RATE: 101 BPM
VENTRICULAR RATE: 98 BPM
WBC # BLD AUTO: 5.53 THOUSAND/UL (ref 4.31–10.16)

## 2024-11-26 PROCEDURE — 93306 TTE W/DOPPLER COMPLETE: CPT

## 2024-11-26 PROCEDURE — B245ZZ4 ULTRASONOGRAPHY OF LEFT HEART, TRANSESOPHAGEAL: ICD-10-PCS | Performed by: STUDENT IN AN ORGANIZED HEALTH CARE EDUCATION/TRAINING PROGRAM

## 2024-11-26 PROCEDURE — 85027 COMPLETE CBC AUTOMATED: CPT | Performed by: INTERNAL MEDICINE

## 2024-11-26 PROCEDURE — 97166 OT EVAL MOD COMPLEX 45 MIN: CPT

## 2024-11-26 PROCEDURE — 84484 ASSAY OF TROPONIN QUANT: CPT | Performed by: INTERNAL MEDICINE

## 2024-11-26 PROCEDURE — 84443 ASSAY THYROID STIM HORMONE: CPT | Performed by: INTERNAL MEDICINE

## 2024-11-26 PROCEDURE — 80053 COMPREHEN METABOLIC PANEL: CPT | Performed by: INTERNAL MEDICINE

## 2024-11-26 PROCEDURE — 93005 ELECTROCARDIOGRAM TRACING: CPT

## 2024-11-26 PROCEDURE — 85379 FIBRIN DEGRADATION QUANT: CPT | Performed by: INTERNAL MEDICINE

## 2024-11-26 PROCEDURE — 84484 ASSAY OF TROPONIN QUANT: CPT

## 2024-11-26 PROCEDURE — 84145 PROCALCITONIN (PCT): CPT | Performed by: INTERNAL MEDICINE

## 2024-11-26 PROCEDURE — 5A2204Z RESTORATION OF CARDIAC RHYTHM, SINGLE: ICD-10-PCS | Performed by: STUDENT IN AN ORGANIZED HEALTH CARE EDUCATION/TRAINING PROGRAM

## 2024-11-26 PROCEDURE — 97162 PT EVAL MOD COMPLEX 30 MIN: CPT

## 2024-11-26 PROCEDURE — 93010 ELECTROCARDIOGRAM REPORT: CPT

## 2024-11-26 PROCEDURE — 99222 1ST HOSP IP/OBS MODERATE 55: CPT | Performed by: INTERNAL MEDICINE

## 2024-11-26 PROCEDURE — 93306 TTE W/DOPPLER COMPLETE: CPT | Performed by: INTERNAL MEDICINE

## 2024-11-26 PROCEDURE — 99232 SBSQ HOSP IP/OBS MODERATE 35: CPT | Performed by: PHYSICIAN ASSISTANT

## 2024-11-26 RX ORDER — METOPROLOL SUCCINATE 50 MG/1
50 TABLET, EXTENDED RELEASE ORAL EVERY 12 HOURS
Status: DISCONTINUED | OUTPATIENT
Start: 2024-11-26 | End: 2024-11-28 | Stop reason: HOSPADM

## 2024-11-26 RX ORDER — METOPROLOL SUCCINATE 25 MG/1
25 TABLET, EXTENDED RELEASE ORAL ONCE
Status: COMPLETED | OUTPATIENT
Start: 2024-11-26 | End: 2024-11-26

## 2024-11-26 RX ADMIN — METOPROLOL SUCCINATE 50 MG: 50 TABLET, EXTENDED RELEASE ORAL at 20:32

## 2024-11-26 RX ADMIN — ONDANSETRON 4 MG: 2 INJECTION INTRAMUSCULAR; INTRAVENOUS at 18:57

## 2024-11-26 RX ADMIN — METOPROLOL SUCCINATE 25 MG: 25 TABLET, EXTENDED RELEASE ORAL at 09:38

## 2024-11-26 RX ADMIN — LATANOPROST 1 DROP: 50 SOLUTION OPHTHALMIC at 20:32

## 2024-11-26 RX ADMIN — APIXABAN 2.5 MG: 2.5 TABLET, FILM COATED ORAL at 08:29

## 2024-11-26 RX ADMIN — ALPRAZOLAM 0.5 MG: 0.5 TABLET ORAL at 18:57

## 2024-11-26 RX ADMIN — METOPROLOL TARTRATE 25 MG: 25 TABLET, FILM COATED ORAL at 08:29

## 2024-11-26 RX ADMIN — Medication 1 TABLET: at 08:29

## 2024-11-26 RX ADMIN — APIXABAN 5 MG: 5 TABLET, FILM COATED ORAL at 20:32

## 2024-11-26 RX ADMIN — ATORVASTATIN CALCIUM 20 MG: 20 TABLET, FILM COATED ORAL at 08:29

## 2024-11-26 RX ADMIN — DESVENLAFAXINE 50 MG: 50 TABLET, EXTENDED RELEASE ORAL at 08:29

## 2024-11-26 NOTE — PLAN OF CARE
Problem: OCCUPATIONAL THERAPY ADULT  Goal: Performs self-care activities at highest level of function for planned discharge setting.  See evaluation for individualized goals.  Description: Treatment Interventions: ADL retraining, UE strengthening/ROM, Functional transfer training, Endurance training, Patient/family training, Neuromuscular reeducation, Compensatory technique education, Energy conservation, Activityengagement          See flowsheet documentation for full assessment, interventions and recommendations.   Note: Limitation: Decreased ADL status, Decreased UE strength, Decreased Safe judgement during ADL, Decreased endurance, Decreased self-care trans, Decreased high-level ADLs  Prognosis: Good  Assessment: Duong Reece is a 89 y.o. female seen for OT evaluation s/p admit to [facility] on 11/25/2024 w/ Rapid atrial fibrillation (HCC).  Comorbidities affecting pt's functional performance at time of assessment include:   depression, anxiety, glaucoma, CHF, osteopenia, BPPV . Pt with active OT orders and activity orders for Up and OOB as tolerated. Personal factors affecting pt at time of IE include:limited home support, difficulty performing ADLs, difficulty performing IADLs, and difficulty performing transfers/mobility. Prior to admission, pt lives alone in a single level apt with 0 JOSE ANTONIO- elevator access. I with ADLS, IADLs and mobility with no device. 0 falls. Drives. Upon evaluation: Pt currently requires supervision for UB ADLs, supervision for LB ADLs, supervision for toileting, Nati for bed mobility, supervision for functional transfers, and supervision mobility 2* the following deficits impacting occupational performance:weakness, decreased strength , decreased balance, and decreased activity tolerance. Pt to benefit from continued skilled OT tx while in the hospital to address deficits as defined above and maximize level of functional independence w ADL's and functional mobility. Occupational Performance  areas to address include: grooming, bathing/shower, toilet hygiene, dressing, functional mobility, and clothing management. From OT standpoint, recommendation at time of d/c would be level 3 resources. OT to follow pt on caseload 1-3x/wk.     Rehab Resource Intensity Level, OT: III (Minimum Resource Intensity)

## 2024-11-26 NOTE — ASSESSMENT & PLAN NOTE
Wt Readings from Last 3 Encounters:   11/26/24 68 kg (149 lb 14.6 oz)   02/26/24 64.8 kg (142 lb 12.8 oz)   02/24/23 64.8 kg (142 lb 12.8 oz)     Secondary to A-fib RVR  Echo pending  CXR with possible effusion, will repeat and give additional diuretics if needed

## 2024-11-26 NOTE — PLAN OF CARE

## 2024-11-26 NOTE — ASSESSMENT & PLAN NOTE
Secondary to rapid atrial fibrillation but also possibly pleural effusion  Give 1 dose of IV furosemide.  Reassess tomorrow.  Check echocardiogram  Check procalcitonin to rule out infectious etiology    Results from last 7 days   Lab Units 11/25/24  1555   BNP pg/mL 571*

## 2024-11-26 NOTE — PROGRESS NOTES
Progress Note - Hospitalist   Name: Duong Reece 89 y.o. female I MRN: [id number]  Unit/Bed#: E4 -01 I Date of Admission: 11/25/2024   Date of Service: 11/26/2024 I Hospital Day: 1    Assessment & Plan  Rapid atrial fibrillation (HCC)  History of anxiety/depression hyperlipidemia glaucoma with recent findings of atrial fibrillation presents to the hospital with worsening shortness of breath  Her PCP Dr. Oswald retired and she recently establish care with [facility]  During first visit was noted to have atrial fibrillation and started on apixaban.  Patient stopped taking after several weeks due to shortness of breath as she thought this was the cause  Started on low-dose diltiazem infusion on low-dose p.o. metoprolol.  Wean Cardizem, increase metoprolol.  Continue apixaban.  Cardiology following, plan for ROSA/CV tomorrow  Acute pulmonary insufficiency  Secondary to rapid atrial fibrillation but also possibly pleural effusion  Give 1 dose of IV furosemide.  Give additional Lasix  Echo pending    Results from last 7 days   Lab Units 11/25/24  1555   BNP pg/mL 571*     Depression with anxiety  Continue alprazolam.  Confirmed on   Glaucoma  Continue eyedrops  Mixed hyperlipidemia  Continue atorvastatin  Acute congestive heart failure (HCC)  Wt Readings from Last 3 Encounters:   11/26/24 68 kg (149 lb 14.6 oz)   02/26/24 64.8 kg (142 lb 12.8 oz)   02/24/23 64.8 kg (142 lb 12.8 oz)     Secondary to A-fib RVR  Echo pending  CXR with possible effusion, will repeat and give additional diuretics if needed    VTE Pharmacologic Prophylaxis: VTE Score: 3 Moderate Risk (Score 3-4) - Pharmacological DVT Prophylaxis Ordered: apixaban (Eliquis).    Mobility:   Basic Mobility Inpatient Raw Score: 20  JH-HLM Goal: 6: Walk 10 steps or more  JH-HLM Achieved: 7: Walk 25 feet or more  JH-HLM Goal achieved. Continue to encourage appropriate mobility.    Patient Centered Rounds: I performed bedside rounds with nursing staff  today.   Discussions with Specialists or Other Care Team Provider: Cardiology    Education and Discussions with Family / Patient: Updated  (daughter) via phone.    Current Length of Stay: 1 day(s)  Current Patient Status: Inpatient   Certification Statement: The patient will continue to require additional inpatient hospital stay due to A-fib RVR with planned cardioversion tomorrow  Discharge Plan: Anticipate discharge in 24-48 hrs to home.    Code Status: Level 1 - Full Code    Subjective   No acute events overnight.  Reports her breathing is improved.    Objective :  Temp:  [97.5 °F (36.4 °C)-98.7 °F (37.1 °C)] 98.7 °F (37.1 °C)  HR:  [] 97  BP: (111-162)/() 111/88  Resp:  [16-26] 19  SpO2:  [92 %-100 %] 99 %  O2 Device: None (Room air)  Nasal Cannula O2 Flow Rate (L/min):  [2 L/min] 2 L/min    Body mass index is 26.56 kg/m².     Input and Output Summary (last 24 hours):     Intake/Output Summary (Last 24 hours) at 11/26/2024 1308  Last data filed at 11/26/2024 0829  Gross per 24 hour   Intake 232.5 ml   Output 775 ml   Net -542.5 ml       Physical Exam  Vitals and nursing note reviewed.   Constitutional:       Appearance: Normal appearance.   HENT:      Head: Normocephalic and atraumatic.      Mouth/Throat:      Mouth: Mucous membranes are moist.      Pharynx: Oropharynx is clear. No oropharyngeal exudate.   Eyes:      Extraocular Movements: Extraocular movements intact.   Cardiovascular:      Rate and Rhythm: Normal rate. Rhythm irregular.      Pulses: Normal pulses.      Heart sounds: Normal heart sounds. No murmur heard.     No friction rub. No gallop.   Pulmonary:      Effort: Pulmonary effort is normal. No respiratory distress.      Breath sounds: Normal breath sounds. No stridor. No wheezing or rales.   Abdominal:      General: Abdomen is flat. Bowel sounds are normal. There is no distension.      Palpations: Abdomen is soft.      Tenderness: There is no abdominal tenderness.    Musculoskeletal:      Right lower leg: No edema.      Left lower leg: No edema.   Skin:     General: Skin is warm and dry.      Findings: Bruising present.   Neurological:      General: No focal deficit present.      Mental Status: She is alert and oriented to person, place, and time.           Lines/Drains:        Telemetry:  Telemetry Orders (From admission, onward)               24 Hour Telemetry Monitoring  (ED Bridging Orders Panel)  Continuous x 24 Hours (Telem)        Question:  Reason for 24 Hour Telemetry  Answer:  Arrhythmias requiring acute medical intervention / PPM or ICD malfunction                     Telemetry Reviewed: Atrial fibrillation. HR averaging 100  Indication for Continued Telemetry Use: Arrthymias requiring medical therapy               Lab Results: I have reviewed the following results:   Results from last 7 days   Lab Units 11/26/24  0340 11/25/24  1555   WBC Thousand/uL 5.53 7.17   HEMOGLOBIN g/dL 14.3 15.1   HEMATOCRIT % 44.7 47.1*   PLATELETS Thousands/uL 219 233   SEGS PCT %  --  55   LYMPHO PCT %  --  36   MONO PCT %  --  9   EOS PCT %  --  0     Results from last 7 days   Lab Units 11/26/24  0610   SODIUM mmol/L 141   POTASSIUM mmol/L 3.5   CHLORIDE mmol/L 105   CO2 mmol/L 29   BUN mg/dL 20   CREATININE mg/dL 0.77   ANION GAP mmol/L 7   CALCIUM mg/dL 9.0   ALBUMIN g/dL 3.6   TOTAL BILIRUBIN mg/dL 0.79   ALK PHOS U/L 87   ALT U/L 17   AST U/L 28   GLUCOSE RANDOM mg/dL 103                 Results from last 7 days   Lab Units 11/26/24  0340   PROCALCITONIN ng/ml 0.08       Recent Cultures (last 7 days):         Imaging Results Review: No pertinent imaging studies reviewed.  Other Study Results Review: No additional pertinent studies reviewed.    Last 24 Hours Medication List:     Current Facility-Administered Medications:     acetaminophen (TYLENOL) tablet 650 mg, Q4H PRN    ALPRAZolam (XANAX) tablet 0.5 mg, TID PRN    apixaban (ELIQUIS) tablet 5 mg, BID    atorvastatin (LIPITOR)  tablet 20 mg, Daily    desvenlafaxine succinate (PRISTIQ) 24 hr tablet 50 mg, Daily    diltiazem (CARDIZEM) 125 mg in sodium chloride 0.9 % 125 mL infusion, Titrated, Last Rate: 5 mg/hr (11/26/24 0804)    latanoprost (XALATAN) 0.005 % ophthalmic solution 1 drop, HS    metoprolol succinate (TOPROL-XL) 24 hr tablet 50 mg, Q12H    multivitamin-minerals (CENTRUM) tablet 1 tablet, Daily    ondansetron (ZOFRAN) injection 4 mg, Q4H PRN    Administrative Statements   Today, Patient Was Seen By: Brad Blanco PA-C      **Please Note: This note may have been constructed using a voice recognition system.**

## 2024-11-26 NOTE — OCCUPATIONAL THERAPY NOTE
Occupational Therapy Evaluation     Patient Name: Duong Reece  Today's Date: 11/26/2024  Problem List  Principal Problem:    Rapid atrial fibrillation (HCC)  Active Problems:    Depression with anxiety    Glaucoma    Mixed hyperlipidemia    Acute pulmonary insufficiency    Acute congestive heart failure (HCC)    Past Medical History  Past Medical History:   Diagnosis Date    Anxiety     Atrial fibrillation (HCC)     Depression     Glaucoma     Hyperlipemia      Past Surgical History  Past Surgical History:   Procedure Laterality Date    APPENDECTOMY      HYSTERECTOMY             11/26/24 1052   OT Last Visit   OT Visit Date 11/26/24   Note Type   Note type Evaluation   Additional Comments greeted in supine and agreeable.   Pain Assessment   Pain Assessment Tool 0-10   Pain Score No Pain   Restrictions/Precautions   Weight Bearing Precautions Per Order No   Other Precautions Chair Alarm;Bed Alarm;Cognitive;Multiple lines;Telemetry;Fall Risk   Home Living   Type of Home Apartment   Home Layout One level;Elevator   Bathroom Shower/Tub Tub/shower unit   Bathroom Toilet Standard   Bathroom Equipment Grab bars in shower;Grab bars around toilet   Home Equipment Cane   Prior Function   Level of Cloud Independent with ADLs;Independent with functional mobility   Lives With Alone   Receives Help From Family   IADLs Independent with driving;Independent with medication management;Family/Friend/Other provides meals  (housing facility provides paid meals)   Falls in the last 6 months 0   Vocational Retired   ADL   Where Assessed Edge of bed   Eating Assistance 6  Modified independent   Grooming Assistance 6  Modified Independent   UB Bathing Assistance 6  Modified Independent   LB Bathing Assistance 5  Supervision/Setup   UB Dressing Assistance 5  Supervision/Setup   LB Dressing Assistance 5  Supervision/Setup   Toileting Assistance  5  Supervision/Setup   Bed Mobility   Supine to Sit 6  Modified independent    Additional items HOB elevated;Bedrails   Sit to Supine 6  Modified independent   Additional items HOB elevated;Bedrails   Transfers   Sit to Stand 5  Supervision   Stand to Sit 5  Supervision   Additional Comments cues for safety, pacing and best tech.   Functional Mobility   Functional Mobility 5  Supervision   Additional Comments Ax1, HHA, no device. functional distances  (limited by SOB)   Balance   Static Sitting Good   Dynamic Sitting Fair +   Static Standing Fair -   Dynamic Standing Fair -   Ambulatory Poor +   Activity Tolerance   Activity Tolerance Patient limited by fatigue   Medical Staff Made Aware PT Josefina   Nurse Made Aware RN   RUE Assessment   RUE Assessment WFL   LUE Assessment   LUE Assessment WFL   Hand Function   Gross Motor Coordination Functional   Fine Motor Coordination Functional   Psychosocial   Psychosocial (WDL) WDL   Cognition   Overall Cognitive Status WFL   Arousal/Participation Cooperative   Attention Within functional limits   Orientation Level Oriented X4   Memory Within functional limits   Following Commands Follows one step commands without difficulty   Assessment   Limitation Decreased ADL status;Decreased UE strength;Decreased Safe judgement during ADL;Decreased endurance;Decreased self-care trans;Decreased high-level ADLs   Prognosis Good   Assessment Duong Reece is a 89 y.o. female seen for OT evaluation s/p admit to [facility] on 11/25/2024 w/ Rapid atrial fibrillation (HCC).  Comorbidities affecting pt's functional performance at time of assessment include:   depression, anxiety, glaucoma, CHF, osteopenia, BPPV . Pt with active OT orders and activity orders for Up and OOB as tolerated. Personal factors affecting pt at time of IE include:limited home support, difficulty performing ADLs, difficulty performing IADLs, and difficulty performing transfers/mobility. Prior to admission, pt lives alone in a single level apt with 0 JOSE ANTONIO- elevator access. I with ADLS, IADLs and mobility  with no device. 0 falls. Drives. Upon evaluation: Pt currently requires supervision for UB ADLs, supervision for LB ADLs, supervision for toileting, Nati for bed mobility, supervision for functional transfers, and supervision mobility 2* the following deficits impacting occupational performance:weakness, decreased strength , decreased balance, and decreased activity tolerance. Pt to benefit from continued skilled OT tx while in the hospital to address deficits as defined above and maximize level of functional independence w ADL's and functional mobility. Occupational Performance areas to address include: grooming, bathing/shower, toilet hygiene, dressing, functional mobility, and clothing management. From OT standpoint, recommendation at time of d/c would be level 3 resources. OT to follow pt on caseload 1-3x/wk.   Goals   STG Time Frame 3-5   Short Term Goal #1 Pt will improve activity tolerance to G for min 30 min txment sessions for increase engagement in functional tasks   Short Term Goal #2 Pt will complete toileting w/ mod I w/ G hygiene/thoroughness using DME as needed   Short Term Goal  Pt will complete LB dressing/self care w/ mod I using adaptive device and DME as needed   LTG Time Frame 10-14   Long Term Goal #1 Pt will improve functional transfers/ mobility to Mod I on/off all surfaces using DME as needed w/ G balance/safety   Long Term Goal #2 Pt will participate in simulated IADL management task to increase independence to Mod I w/ G safety and endurance   Long Term Goal Pt will demonstrate G carryover of pt/caregiver education and training as appropriate w/o cues w/ good tolerance to increase safety during functional tasks   Plan   Treatment Interventions ADL retraining;UE strengthening/ROM;Functional transfer training;Endurance training;Patient/family training;Neuromuscular reeducation;Compensatory technique education;Energy conservation;Activityengagement   Goal Expiration Date 12/10/24   OT  Treatment Day 0   OT Frequency 1-2x/wk;2-3x/wk   Discharge Recommendation   Rehab Resource Intensity Level, OT III (Minimum Resource Intensity)   AM-PAC Daily Activity Inpatient   Lower Body Dressing 3   Bathing 3   Toileting 3   Upper Body Dressing 4   Grooming 4   Eating 4   Daily Activity Raw Score 21   Daily Activity Standardized Score (Calc for Raw Score >=11) 44.27   AM-PAC Applied Cognition Inpatient   Following a Speech/Presentation 4   Understanding Ordinary Conversation 3   Taking Medications 3   Remembering Where Things Are Placed or Put Away 3   Remembering List of 4-5 Errands 3   Taking Care of Complicated Tasks 3   Applied Cognition Raw Score 19   Applied Cognition Standardized Score 39.77   Vani Acosta, OT

## 2024-11-26 NOTE — QUICK NOTE
The patient was reevaluated because she developed chest and epigastric discomfort and thought that she was going to die.  She said that her pain was in the epigastrium and over to the left at the bottom of her rib cage.  She denies shortness of breath.  She had no diaphoresis.  The pain did not radiate to her back, arms, or throat.  During my evaluation, the patient did belch twice.  After this, she had considerable relief.    Vital signs were stable.  Lungs were clear.  Cardiac exam revealed an irregular rhythm with adequate rate control.  The abdomen was soft with active bowel sounds.  There was mild epigastric and left upper quadrant tenderness.  There was no mass, rebound, organomegaly.  Extremities reveal no clubbing, cyanosis, or edema.  No calf tenderness was present.    EKG was repeated.  The patient remains in atrial fibrillation.  No acute ischemic changes were noted.    Troponin was 20.  D-dimer was 0.53.  Considering the patient's age, this D-dimer level is essentially normal.  In any case she is already therapeutically anticoagulated.    We will continue the patient's current therapy and continue to monitor her carefully.

## 2024-11-26 NOTE — PLAN OF CARE
Problem: PHYSICAL THERAPY ADULT  Goal: Performs mobility at highest level of function for planned discharge setting.  See evaluation for individualized goals.  Description: Treatment/Interventions: Functional transfer training, LE strengthening/ROM, Elevations, Therapeutic exercise, Endurance training, Patient/family training, Bed mobility, Equipment eval/education, Gait training, Continued evaluation, Spoke to nursing, OT          See flowsheet documentation for full assessment, interventions and recommendations.  Note: Prognosis: Good  Problem List: Impaired balance, Decreased endurance, Impaired judgement, Decreased safety awareness  Assessment: Duong Reece is a 89 y.o. female admitted to [facility] on 11/25/2024 for Rapid atrial fibrillation (HCC). PT was consulted and pt was seen on 11/26/2024 for mobility assessment and d/c planning. Pt presents w low fall risk, multiple lines. At baseline is indep for ADLs and ambulation without an AD. Pt is currently functioning at a mod I bed mobility, S for transfers and ambulation. Pt demonstrated ability to ambulate unlimited household distances. She declined further ambulation dt complaints of SOB. No s/s of dyspnea and HR wnl. Mobility likely limited by effort as well. Did note x2 episodes of LOB during ambulation requiring physical assist to correct balance. Would consider trial of SPC next session if pt agreeable. If improvement in sx next session can consider dc to restorative services.  Barriers to Discharge: None     Rehab Resource Intensity Level, PT: III (Minimum Resource Intensity) (can consider OPPT for higher level balance if interested)    See flowsheet documentation for full assessment.

## 2024-11-26 NOTE — ASSESSMENT & PLAN NOTE
Recent diagnosis; poor subjective sense of rate/rhythm  Anticoagulation prior to admission: none (was not long ago prescribed Eliquis but stopped by pt prior to admission)  AV blocking Rx prior to admission: None

## 2024-11-26 NOTE — UTILIZATION REVIEW
Initial Clinical Review    Admission: Date/Time/Statement:   Admission Orders (From admission, onward)       Ordered        11/25/24 1732  INPATIENT ADMISSION  Once                          Orders Placed This Encounter   Procedures    INPATIENT ADMISSION     Standing Status:   Standing     Number of Occurrences:   1     Level of Care:   Med Surg [16]     Estimated length of stay:   More than 2 Midnights     Certification:   I certify that inpatient services are medically necessary for this patient for a duration of greater than two midnights. See H&P and MD Progress Notes for additional information about the patient's course of treatment.     ED Arrival Information       Expected   -    Arrival   11/25/2024 15:44    Acuity   Emergent              Means of arrival   Stretcher    Escorted by   [facility]    Service   Hospitalist    Admission type   Emergency              Arrival complaint   Shortness of breath             Chief Complaint   Patient presents with    Atrial Fibrillation     Pt arrives via EMS. 1 month ago pt dx with a fib and put on eliquis. Pt stopped taking eliquis a couple days ago and started with SOB today.        Initial Presentation: 89 y.o. female who presented by EMS to [facility] ED. Admitted as Inpatient for evaluation and treatment of atrial fibrillation. PMHx: afib, glaucoma, HLD, psychiatric disorder. Presented w/ worsening SOB. Recently changed PCP, noted to have have afib and started on apixaban at the end of September. Pt stopped taking it after several weeks, thinking it was related to her SOB. Symptoms worsened. On arrival noted afib w/ rates to 161. Notes symptoms worse with exertion and unable to lay flat. EXAM: irregular tachycardic rhythm, decreased breath sounds. EKG rapid afib 144 bpm. CXR shows atelectasis vs possible pneumonia; b/l pleural effusions. PLAN: start on low-dose diltiazem gtt, start metoprolol, continue apixaban, IV lasix x1, echo, check procal,  continue PTA meds. Telemetry, DW, I&O, Trend labs, replete electrolytes as needed. Cardiology consulted.     Anticipated Length of Stay/Certification Statement: Patient will be admitted on an inpatient basis with an anticipated length of stay of greater than 2 midnights secondary to rapid atrial fibrillation and shortness of breath.       Date: 11/26/24   Day 2: Pt reports breathing feels improved. Exam: irregular rhythm, generalized bruising. Telemetry: afib w/ average . Plan: continue telemetry, continue current meds, wean cardizem gtt, give additional lasix, echo pending, supportive care. DW, I&O.     Cardiology: Pt presented in afib w/ RVR. Dyspnea likely s/t diastolic HF. Remains tachycardic, change Lopressor to Toprol-XL at increased dose. Wean cardizem gtt as able. Continue Eliquis - dose adjusted to 5 mg BID, check echo. Pending response to this treatment plan, pt may require ROSA guided cardioversion.     ED Treatment-Medication Administration from 11/25/2024 1544 to 11/25/2024 1929         Date/Time Order Dose Route Action     11/25/2024 1633 diltiazem (CARDIZEM) injection 15 mg 15 mg Intravenous Given     11/25/2024 1637 diltiazem (CARDIZEM) 125 mg in sodium chloride 0.9 % 125 mL infusion 5 mg/hr Intravenous New Bag     11/25/2024 1903 diltiazem (CARDIZEM) 125 mg in sodium chloride 0.9 % 125 mL infusion 7.5 mg/hr Intravenous Rate/Dose Change     11/25/2024 1903 furosemide (LASIX) injection 20 mg 20 mg Intravenous Given     11/25/2024 1904 metoprolol tartrate (LOPRESSOR) tablet 25 mg 25 mg Oral Given            Scheduled Medications:  apixaban, 5 mg, Oral, BID  atorvastatin, 20 mg, Oral, Daily  desvenlafaxine succinate, 50 mg, Oral, Daily  latanoprost, 1 drop, Both Eyes, HS  metoprolol succinate, 50 mg, Oral, Q12H  multivitamin-minerals, 1 tablet, Oral, Daily    Continuous IV Infusions:  diltiazem, 1-15 mg/hr, Intravenous, Titrated    PRN Meds:  acetaminophen, 650 mg, Oral, Q4H PRN  ALPRAZolam, 0.5  mg, Oral, TID PRN; 11/25 x1  ondansetron, 4 mg, Intravenous, Q4H PRN    apixaban (ELIQUIS) tablet 2.5 mg  Dose: 2.5 mg  Freq: 2 times daily Route: PO  Start: 11/25/24 2100 End: 11/26/24 0922  metoprolol succinate (TOPROL-XL) 24 hr tablet 25 mg  Dose: 25 mg  Freq: Once Route: PO  Indications of Use: ATRIAL FIBRILLATION  Start: 11/26/24 0930 End: 11/26/24 0938  metoprolol tartrate (LOPRESSOR) tablet 25 mg  Dose: 25 mg  Freq: Every 12 hours scheduled Route: PO  Start: 11/25/24 1900 End: 11/26/24 0921      ED Triage Vitals   Temperature Pulse Respirations Blood Pressure SpO2 Pain Score   11/25/24 1550 11/25/24 1550 11/25/24 1550 11/25/24 1630 11/25/24 1550 11/25/24 1948   98.2 °F (36.8 °C) (!) 161 20 127/93 100 % No Pain     Weight (last 2 days)       Date/Time Weight    11/26/24 0542 68 (149.91)    11/25/24 1939 68.1 (150.13)    11/25/24 1550 68.1 (150.13)            Vital Signs (last 3 days)       Date/Time Temp Pulse Resp BP MAP (mmHg) SpO2 Calculated FIO2 (%) - Nasal Cannula Nasal Cannula O2 Flow Rate (L/min) O2 Device Adrian Coma Scale Score Pain    11/26/24 0804 -- 112 -- -- -- -- -- -- -- -- --    11/26/24 0747 -- -- -- -- -- -- -- -- -- 15 --    11/26/24 0744 -- -- -- -- -- -- -- -- -- -- 3    11/26/24 0743 97.8 °F (36.6 °C) 100 21 147/79 94 94 % -- -- None (Room air) -- --    11/26/24 0312 98 °F (36.7 °C) 105 20 127/73 91 93 % -- -- None (Room air) -- --    11/26/24 0214 97.5 °F (36.4 °C) 99 20 126/79 -- 96 % -- -- None (Room air) -- 6    11/25/24 2255 97.6 °F (36.4 °C) 74 16 112/81 93 94 % -- -- None (Room air) -- --    11/25/24 1948 -- -- -- -- -- -- -- -- -- 15 No Pain    11/25/24 1939 97.8 °F (36.6 °C) 62 16 112/96 101 92 % -- -- None (Room air) -- --    11/25/24 1915 -- 99 16 127/76 93 98 % 28 2 L/min Nasal cannula -- --    11/25/24 1900 -- 105 18 132/103 -- 98 % -- -- None (Room air) -- --    11/25/24 1845 -- 104 20 -- -- 98 % -- -- -- -- --    11/25/24 1815 -- 100 18 120/65 89 97 % -- -- None (Room air)  -- --    11/25/24 1745 -- 96 20 162/80 104 96 % -- -- None (Room air) -- --    11/25/24 1715 -- 98 22 122/79 93 98 % -- -- None (Room air) -- --    11/25/24 1700 -- 95 20 120/84 98 98 % -- -- None (Room air) -- --    11/25/24 1645 -- 100 22 124/90 99 98 % -- -- None (Room air) -- --    11/25/24 1637 -- 108 -- -- -- -- -- -- -- -- --    11/25/24 1630 -- 148 26 127/93 103 100 % -- -- None (Room air) -- --    11/25/24 1556 -- -- -- -- -- -- -- -- -- 15 --    11/25/24 1550 98.2 °F (36.8 °C) 161 20 -- -- 100 % -- -- None (Room air) -- --              Pertinent Labs/Diagnostic Test Results:   Radiology:  XR chest 1 view portable   ED Interpretation by Leroy Nieves MD (11/25 1725)   Independently reviewed bilateral basilar opacities and effusions      Final Interpretation by Brad Camacho MD (11/25 1703)      Bibasilar pulmonary densities with appearance favoring atelectasis although pneumonia should be considered in the appropriate clinical scenario.      Small bilateral right greater than left pleural effusions are noted as well.      The study was marked in EPIC for immediate notification.            Workstation performed: [id number]           Cardiology:  No orders to display             Results from last 7 days   Lab Units 11/26/24  0340 11/25/24  1555   WBC Thousand/uL 5.53 7.17   HEMOGLOBIN g/dL 14.3 15.1   HEMATOCRIT % 44.7 47.1*   PLATELETS Thousands/uL 219 233   TOTAL NEUT ABS Thousands/µL  --  3.89         Results from last 7 days   Lab Units 11/26/24  0610 11/25/24  1555   SODIUM mmol/L 141 140   POTASSIUM mmol/L 3.5 4.0   CHLORIDE mmol/L 105 104   CO2 mmol/L 29 30   ANION GAP mmol/L 7 6   BUN mg/dL 20 25   CREATININE mg/dL 0.77 0.87   EGFR ml/min/1.73sq m 69 60   CALCIUM mg/dL 9.0 10.3*     Results from last 7 days   Lab Units 11/26/24  0610 11/25/24  1555   AST U/L 28 30   ALT U/L 17 22   ALK PHOS U/L 87 105*   TOTAL PROTEIN g/dL 6.1* 6.6   ALBUMIN g/dL 3.6 3.9   TOTAL BILIRUBIN mg/dL 0.79 0.66          Results from last 7 days   Lab Units 11/26/24  0610 11/25/24  1555   GLUCOSE RANDOM mg/dL 103 118     Results from last 7 days   Lab Units 11/26/24  0340 11/25/24  1958 11/25/24  1815 11/25/24  1555   HS TNI 0HR ng/L 21  --   --  24   HS TNI 2HR ng/L  --   --  23  --    HSTNI D2 ng/L  --   --  -1  --    HS TNI 4HR ng/L  --  21  --   --    HSTNI D4 ng/L  --  -3  --   --      Results from last 7 days   Lab Units 11/26/24  0340   TSH 3RD GENERATON uIU/mL 1.934     Results from last 7 days   Lab Units 11/26/24  0340   PROCALCITONIN ng/ml 0.08     Results from last 7 days   Lab Units 11/25/24  1555   BNP pg/mL 571*         Past Medical History:   Diagnosis Date    Anxiety     Atrial fibrillation (HCC)     Depression     Glaucoma     Hyperlipemia      Present on Admission:   Depression with anxiety   Glaucoma   Mixed hyperlipidemia   Acute congestive heart failure (HCC)      Admitting Diagnosis: Atrial fibrillation (HCC) [I48.91]  Dyspnea [R06.00]  Atrial fibrillation with rapid ventricular response (HCC) [I48.91]  Age/Sex: 89 y.o. female    Network Utilization Review Department  ATTENTION: Please call with any questions or concerns to [phone] and carefully listen to the prompts so that you are directed to the right person. All voicemails are confidential.   For Discharge needs, contact Care Management DC Support Team at [phone] opt. 2  Send all requests for admission clinical reviews, approved or denied determinations and any other requests to dedicated fax number below belonging to the campus where the patient is receiving treatment. List of dedicated fax numbers for the Facilities:  FACILITY NAME UR FAX NUMBER   ADMISSION DENIALS (Administrative/Medical Necessity) [phone]   DISCHARGE SUPPORT TEAM (NETWORK) [phone]   PARENT CHILD HEALTH (Maternity/NICU/Pediatrics) [phone]   [facility] [phone]   [facility] [phone]   [facility]  [facility] [phone]   [facility] [phone]   [facility] [phone]   [facility] [phone]   [facility] [phone]   [facility] [phone]   [facility] [phone]   [facility] [phone]   [facility] [phone]   [facility] [phone]

## 2024-11-26 NOTE — CONSULTS
Consultation - Cardiology   Name: Duong Reece 89 y.o. female I MRN: [id number]  Unit/Bed#: E4 -01 I Date of Admission: 11/25/2024   Date of Service: 11/26/2024 I Hospital Day: 1   Inpatient consult to Cardiology  Consult performed by: Garrett Gonsalves PA-C  Consult ordered by: Kevin Elder DO      Physician Requesting Evaluation: Sohail Castorena MD   Reason for Evaluation / Principal Problem: atrial fibrillation    Assessment & Plan  Acute pulmonary insufficiency  Chief complaint -likely due to new heart failure with pleural effusion and likely precipitated by tachyarrhythmia  Acute congestive heart failure (HCC)  New Dx - unspecified type; probable diastolic    Rapid atrial fibrillation (HCC)  Recent diagnosis; poor subjective sense of rate/rhythm  Anticoagulation prior to admission: none (was not long ago prescribed Eliquis but stopped by pt prior to admission)  AV blocking Rx prior to admission: None  Mixed hyperlipidemia  Atorvastatin 20 mg  Glaucoma    Depression with anxiety        Discussion/plan  # Patient with recent diagnosis of atrial fibrillation presents in atrial fibrillation with a rapid ventricular rate with complaints of dyspnea likely due to diastolic heart failure    Still tachycardic will replace Lopressor with Toprol-XL at an increased dose  Wean from Cardizem drip as able  Initiated on Eliquis at arrival ~~> with age greater than 80 but creatinine not more than 1.5 and weight not less than 60 kg appropriate Eliquis dosing would be 5 mg twice daily; dose adjusted  Check echocardiogram  Guided by response to the above therapy consideration can be given to ROSA guided cardioversion        History Of Present Illness:  Duong Reece is an 89-year-old who reports historically modest medical history and whose primary care physician had retired earlier this year.  A month or 2 ago seeking refills of chronic medications the patient had an initial encounter to establish care with a new provider -[place]  [facility].  At her visit she was noted to have atrial fibrillation.  She has poor subjective recognition of her rate and rhythm and was otherwise feeling well at the time.  Ms. Reece indicates that at discovery she was referred to emergency department but refused to do so.  She was referred to cardiology but never went, and was initiated on Eliquis which she took for several weeks but then, because of symptoms of dyspnea which she stopped taking it thinking it was caused by the medicine.     Yesterday, 11/25/2024, because of continued problems with effort dyspnea and development of some orthopnea she called an ambulance and was brought to the hospital.  She presented in atrial fibrillation with elevated ventricular rate, was admitted, and our consultation requested.    Since arrival she was initiated on a Cardizem drip, Lopressor, Eliquis, and given a dose of IV Lasix.  At the time my visit she reports feeling comfortable, still unaware of her rate and rhythm.  Normal range TSH at 1.93 with normal high-sensitivity troponin levels    Past Medical History:        Past Medical History:   Diagnosis Date    Anxiety     Atrial fibrillation (HCC)     Depression     Glaucoma     Hyperlipemia       Past Surgical History:   Procedure Laterality Date    APPENDECTOMY      HYSTERECTOMY          Allergy:        No Known Allergies    Medications:       Prior to Admission medications    Medication Sig Start Date End Date Taking? Authorizing Provider   ALPRAZolam (XANAX) 0.5 mg tablet TAKE 1 TABLET BY MOUTH EVERY 4 TO 6 HOURS AS NEEDED 6/3/24  Yes Neal Oswald MD   aspirin (ECOTRIN LOW STRENGTH) 81 mg EC tablet Take by mouth   Yes Historical Provider, MD   atorvastatin (LIPITOR) 20 mg tablet TAKE 1 TABLET BY MOUTH EVERY DAY 5/4/24  Yes Neal Oswald MD   desvenlafaxine succinate (PRISTIQ) 50 mg 24 hr tablet Take 1 tablet (50 mg total) by mouth daily 8/28/24  Yes Neal Oswald MD   Eliquis 2.5 MG Take 2.5 mg by mouth 2  (two) times a day 9/23/24  Yes Historical Provider, MD   latanoprost (XALATAN) 0.005 % ophthalmic solution INSTILL 1 DROP INTO BOTH EYES AT BEDTIME 1/2/22  Yes Historical Provider, MD   MULTIPLE VITAMINS-CALCIUM PO Take 1 tablet by mouth daily   Yes Historical Provider, MD   TRAVATAN Z 0.004 % ophthalmic solution Administer 1 drop to both eyes daily at bedtime 2/3/18  Yes Historical Provider, MD       Family History:     Family History   Problem Relation Age of Onset    Hypertension Mother     Depression Mother     Stroke Father     Alcohol abuse Son     Substance Abuse Son         Social History:       Social History     Socioeconomic History    Marital status:      Spouse name: None    Number of children: None    Years of education: None    Highest education level: None   Occupational History    None   Tobacco Use    Smoking status: Never    Smokeless tobacco: Never   Vaping Use    Vaping status: Never Used   Substance and Sexual Activity    Alcohol use: Yes     Comment: Rarely    Drug use: No    Sexual activity: None   Other Topics Concern    None   Social History Narrative    Always uses seat belt      Social Drivers of Health     Financial Resource Strain: Low Risk  (2/26/2024)    Overall Financial Resource Strain (CARDIA)     Difficulty of Paying Living Expenses: Not hard at all   Food Insecurity: No Food Insecurity (11/25/2024)    Nursing - Inadequate Food Risk Classification     Worried About Running Out of Food in the Last Year: Not on file     Ran Out of Food in the Last Year: Not on file     Ran Out of Food in the Last Year: 1   Transportation Needs: No Transportation Needs (11/25/2024)    Nursing - Transportation Risk Classification     Lack of Transportation: Not on file     Lack of Transportation: 2   Physical Activity: Not on file   Stress: Not on file   Social Connections: Not on file   Intimate Partner Violence: Unknown (11/25/2024)    Nursing IPS     Feels Physically and Emotionally Safe:  Not on file     Physically Hurt by Someone: Not on file     Humiliated or Emotionally Abused by Someone: Not on file     Physically Hurt by Someone: 2     Hurt or Threatened by Someone: 2   Housing Stability: Unknown (2024)    Nursing: Inadequate Housing Risk Classification     Has Housing: Not on file     Worried About Losing Housing: Not on file     Unable to Get Utilities: Not on file     Unable to Pay for Housing in the Last Year: 2     Has Housin       ROS:  Symptoms per HPI  Frequent bouts of anxiety, depression  The remainder of the review of systems is negative    Exam:  General: Alert comfortable appearing.  At times some pressured speech but overall normally conversant   head: Normocephalic, atraumatic.  Eyes:  EOMI. Pupils - equal, round, reactive to accomodation.  No icterus.  Normal Conjunctiva.   Oropharynx: Moist without lesion  Neck:  No gross bruit, JVD, thyromegaly, or lymphadenopathy  Heart:  Regular with controlled rate.  No rub nor pathologic murmur  Lungs:  Clear without rales/rhonchi/wheeze  Abdomen:  Soft and nontender with normal bowel sounds. No organomegaly or mass  Lower Limbs:  No edema  Pulses:  RLE - DP:  1+                LLE - DP:  1+  Musculoskeletal: Independent movement of limbs observed, Formal ROM and strength eval not performed  Neurologic:    Oriented to: person, place, situation.     Cranial Nerves: grossly intact - vision, smell, taste, and hearing were not tested.     Motor function: grossly normal, symmetric   Sensation: Was not tested      Vitals:    24 0312 24 0542 24 0743 24 0804   BP: 127/73  147/79    BP Location: Left arm  Left arm    Pulse: 105  100 (!) 112   Resp: 20  21    Temp: 98 °F (36.7 °C)  97.8 °F (36.6 °C)    TempSrc: Temporal  Temporal    SpO2: 93%  94%    Weight:  68 kg (149 lb 14.6 oz)     Height:               DATA:      ECG:                       -----------------------------------------------------------------------------------------------------------------------------------------------  Telemetry:   Fibrillation ventricular rate trend              -----------------------------------------------------------------------------------------------------------------------------------------------  Weights:    Wt Readings from Last 20 Encounters:   11/26/24 68 kg (149 lb 14.6 oz)   02/26/24 64.8 kg (142 lb 12.8 oz)   02/24/23 64.8 kg (142 lb 12.8 oz)   02/23/22 65 kg (143 lb 3.2 oz)   02/22/21 67.8 kg (149 lb 6.4 oz)   02/17/20 69.9 kg (154 lb)   11/05/19 71.2 kg (157 lb)   07/09/19 71.2 kg (157 lb)   01/07/19 70 kg (154 lb 6.4 oz)   06/25/18 69.9 kg (154 lb)   02/16/18 70.5 kg (155 lb 6.4 oz)   12/20/17 69.7 kg (153 lb 9.6 oz)   06/21/17 68.5 kg (151 lb)   12/21/16 68 kg (150 lb)   06/15/16 67.9 kg (149 lb 12.8 oz)   05/11/16 69.4 kg (153 lb)   12/07/15 70.3 kg (155 lb)   06/05/15 68.6 kg (151 lb 2.9 oz)   12/01/14 72.3 kg (159 lb 6.4 oz)   05/23/14 69.4 kg (153 lb)   , Body mass index is 26.56 kg/m².         Lab Studies:               Results from last 7 days   Lab Units 11/26/24  0340 11/25/24  1555   WBC Thousand/uL 5.53 7.17   HEMOGLOBIN g/dL 14.3 15.1   HEMATOCRIT % 44.7 47.1*   PLATELETS Thousands/uL 219 233   ,   Results from last 7 days   Lab Units 11/26/24  0610 11/25/24  1555   POTASSIUM mmol/L 3.5 4.0   CHLORIDE mmol/L 105 104   CO2 mmol/L 29 30   BUN mg/dL 20 25   CREATININE mg/dL 0.77 0.87   CALCIUM mg/dL 9.0 10.3*   ALK PHOS U/L 87 105*   ALT U/L 17 22   AST U/L 28 30

## 2024-11-26 NOTE — ASSESSMENT & PLAN NOTE
History of anxiety/depression hyperlipidemia glaucoma with recent findings of atrial fibrillation presents to the hospital with worsening shortness of breath  Her PCP Dr. Oswald retired and she recently establish care with [facility]  During first visit was noted to have atrial fibrillation and started on apixaban.  Patient stopped taking after several weeks due to shortness of breath as she thought this was the cause  Started on low-dose diltiazem infusion.  Start metoprolol.  Continue apixaban.  Consult cardiology.

## 2024-11-26 NOTE — ASSESSMENT & PLAN NOTE
History of anxiety/depression hyperlipidemia glaucoma with recent findings of atrial fibrillation presents to the hospital with worsening shortness of breath  Her PCP Dr. Oswald retired and she recently establish care with [facility]  During first visit was noted to have atrial fibrillation and started on apixaban.  Patient stopped taking after several weeks due to shortness of breath as she thought this was the cause  Started on low-dose diltiazem infusion on low-dose p.o. metoprolol.  Wean Cardizem, increase metoprolol.  Continue apixaban.  Cardiology following, plan for ROSA/CV tomorrow

## 2024-11-26 NOTE — CASE MANAGEMENT
Case Management Assessment & Discharge Planning Note    Patient name Duong Reece  Location East 4 /E4 -* MRN [id number]  : 1935 Date 2024       Current Admission Date: 2024  Current Admission Diagnosis:Rapid atrial fibrillation (HCC)   Patient Active Problem List    Diagnosis Date Noted Date Diagnosed    Acute congestive heart failure (HCC) 2024     Rapid atrial fibrillation (HCC) 2024     Acute pulmonary insufficiency 2024     Urticaria, acute 2023     Current mild episode of major depressive disorder without prior episode (HCC) 2020     Overweight (BMI 25.0-29.9) 2019     Hyperglycemia 2018     Cervical disc disease 2018     Pain of right heel 2017     Benign paroxysmal positional vertigo 06/15/2016     Depression with anxiety 2015     Osteopenia 2014     Glaucoma 2013     Esophagitis, reflux 2012     Mixed hyperlipidemia 2012     Anxiety 2012       LOS (days): 1  Geometric Mean LOS (GMLOS) (days): 2.3  Days to GMLOS:1.6     OBJECTIVE:    Risk of Unplanned Readmission Score: 8.57         Current admission status: Inpatient       Preferred Pharmacy:   [facility]/pharmacy #[id number] - [place] - [address] [place] [address]  [place] [address]  Phone: [phone] Fax: [phone]    Primary Care Provider: Duong Oliveros    Primary Insurance: [facility]  Secondary Insurance:     ASSESSMENT:  Active Health Care Proxies    There are no active Health Care Proxies on file.       Advance Directives  Does patient have a Health Care POA?: Yes  Does patient have Advance Directives?: Yes  Advance Directives: Living will, Power of  for health care  Primary Contact: Nelly Patterson (Child)   [phone] (M         Readmission Root Cause  30 Day Readmission: No    Patient Information  Admitted from:: Home  Mental Status: Alert  During Assessment patient was accompanied by: Not  accompanied during assessment  Assessment information provided by:: Patient  Primary Caregiver: Self  Support Systems: Self, Daughter  County of Residence: [place]  What city do you live in?: [place]  Home entry access options. Select all that apply.: Elevator  Type of Current Residence: Apartment  Floor Level: 5  Upon entering residence, is there a bedroom on the main floor (no further steps)?: Yes  Upon entering residence, is there a bathroom on the main floor (no further steps)?: Yes  Living Arrangements: Lives Alone  Is patient a ?: No    Activities of Daily Living Prior to Admission  Functional Status: Independent  Completes ADLs independently?: Yes  Ambulates independently?: Yes  Does patient use assisted devices?: No  Does patient currently own DME?: Yes  What DME does the patient currently own?: Straight Cane  Does patient have a history of Outpatient Therapy (PT/OT)?: Yes  Does the patient have a history of Short-Term Rehab?: No  Does patient have a history of HHC?: No  Does patient currently have HHC?: No         Patient Information Continued  Income Source: Pension/USP  Does patient have prescription coverage?: Yes  Does patient receive dialysis treatments?: No  Does patient have a history of substance abuse?: No  Does patient have a history of Mental Health Diagnosis?: Yes (Anxiety and depression)  Is patient receiving treatment for mental health?: Yes (Medication management through PCP)  Has patient received inpatient treatment related to mental health in the last 2 years?: No         Means of Transportation  Means of Transport to Appts:: Drives Self      Social Determinants of Health (SDOH)      Flowsheet Row Most Recent Value   Housing Stability    In the last 12 months, was there a time when you were not able to pay the mortgage or rent on time? N   In the past 12 months, how many times have you moved where you were living? 0   At any time in the past 12 months, were you homeless or  living in a shelter (including now)? N   Transportation Needs    In the past 12 months, has lack of transportation kept you from medical appointments or from getting medications? no   In the past 12 months, has lack of transportation kept you from meetings, work, or from getting things needed for daily living? No   Food Insecurity    Within the past 12 months, you worried that your food would run out before you got the money to buy more. Never true   Within the past 12 months, the food you bought just didn't last and you didn't have money to get more. Never true   Utilities    In the past 12 months has the electric, gas, oil, or water company threatened to shut off services in your home? No            DISCHARGE DETAILS:    Discharge planning discussed with:: Patient and son  Freedom of Choice: Yes  Comments - Freedom of Choice: Pt hopeful to DC home  CM contacted family/caregiver?: Yes  Were Treatment Team discharge recommendations reviewed with patient/caregiver?: Yes  Did patient/caregiver verbalize understanding of patient care needs?: Yes  Were patient/caregiver advised of the risks associated with not following Treatment Team discharge recommendations?: Yes    Contacts  Patient Contacts: Son at bedside  Relationship to Patient:: Family  Contact Method: In Person  Reason/Outcome: Continuity of Care    Requested Home Health Care         Is the patient interested in HHC at discharge?: No    DME Referral Provided  Referral made for DME?: No         Would you like to participate in our [facility] service program?  : No - Declined    Treatment Team Recommendation: Home  Discharge Destination Plan:: Home  Transport at Discharge : Family                    CM met with patient and son at bedside to introduce self and role with DC planning.  Patient resides alone in an apartment with elevator access.  Patient was independent PTA, she drives and utilizes a can as needed.  Patient reports that family will transport  her home at time of DC.  Patient does not anticipate any CM needs at this time, CM department remains available.

## 2024-11-26 NOTE — PHYSICAL THERAPY NOTE
PHYSICAL THERAPY EVALUATION          Patient Name: Duong Reece  Today's Date: 11/26/2024 11/26/24 1104   PT Last Visit   PT Visit Date 11/26/24   Note Type   Note type Evaluation   Pain Assessment   Pain Assessment Tool 0-10   Pain Score No Pain   Restrictions/Precautions   Other Precautions Chair Alarm;Bed Alarm;Cognitive;Multiple lines;Telemetry;Fall Risk   Home Living   Type of Home Apartment   Home Layout One level;Elevator   Bathroom Shower/Tub Tub/shower unit   Bathroom Toilet Standard   Bathroom Equipment Grab bars in shower;Grab bars around toilet   Home Equipment Cane   Additional Comments per patient from [facility]?   Prior Function   Level of [place] Independent with ADLs;Independent with functional mobility   Lives With Alone   IADLs Family/Friend/Other provides meals;Independent with driving;Independent with medication management  (apt facility provides meals 5 days/wk)   Falls in the last 6 months 0   Vocational Retired   Comments per patient, indep ADLs and ambulation without an AD.   General   Additional Pertinent History pt admitted 11/25/24 for rapid afib. up and oob orders. PMHx significant for depression, anxiety, glaucoma, CHF, osteopenia, BPPV   Cognition   Overall Cognitive Status WFL   Arousal/Participation Cooperative   Orientation Level Oriented X4   Following Commands Follows one step commands without difficulty   Bed Mobility   Supine to Sit 6  Modified independent   Additional items HOB elevated;Increased time required   Sit to Supine 6  Modified independent   Additional items HOB elevated;Increased time required   Transfers   Sit to Stand 5  Supervision   Stand to Sit 5  Supervision   Ambulation/Elevation   Gait pattern WNL;Excessively slow   Gait Assistance 5  Supervision   Additional items Assist x 1   Assistive Device None   Distance 90'   Balance   Static Standing Fair -   Dynamic Standing Fair -   Ambulatory Poor +  (x2 episodes  LOB)   Endurance Deficit   Endurance Deficit Yes   Endurance Deficit Description complaints of SOB during amb. self limited mobility dt sx. HR 80s bpm at rest and 97 bpm post amb   Activity Tolerance   Activity Tolerance Patient limited by fatigue   Medical Staff Made Aware Zakia OT   Nurse Made Aware yes   Assessment   Prognosis Good   Problem List Impaired balance;Decreased endurance;Impaired judgement;Decreased safety awareness   Assessment Duong Reece is a 89 y.o. female admitted to [facility] on 11/25/2024 for Rapid atrial fibrillation (HCC). PT was consulted and pt was seen on 11/26/2024 for mobility assessment and d/c planning. Pt presents w low fall risk, multiple lines. At baseline is indep for ADLs and ambulation without an AD. Pt is currently functioning at a mod I bed mobility, S for transfers and ambulation. Pt demonstrated ability to ambulate unlimited household distances. She declined further ambulation dt complaints of SOB. No s/s of dyspnea and HR wnl. Mobility likely limited by effort as well. Did note x2 episodes of LOB during ambulation requiring physical assist to correct balance. Would consider trial of SPC next session if pt agreeable. If improvement in sx next session can consider dc to restorative services.   Barriers to Discharge None   Goals   Patient Goals feel better   STG Expiration Date 12/03/24   Short Term Goal #1 1) Pt will perform bed mobility indep demonstrating appropriate technique 100% of the time in order to improve function. 2) Perform all transfers with Nati demonstrating safe and appropriate technique 100% of the time in order to improve ability to negotiate safely in home environment. 3) Amb with least restrictive AD > 200'x1 with mod I in order to demonstrate ability to negotiate in home environment.4)  Improve overall strength and balance 1/2 grade in order to optimize ability to perform functional tasks and reduce fall risk.5) Increase activity tolerance to 25 minutes in  order to improve endurance to functional tasks.6) Negotiate stairs using most appropriate technique and S in order to be able to negotiate safely in home environment- does not impact dc. 7) Improve am-pac by 2.   Plan   Treatment/Interventions Functional transfer training;LE strengthening/ROM;Elevations;Therapeutic exercise;Endurance training;Patient/family training;Bed mobility;Equipment eval/education;Gait training;Continued evaluation;Spoke to nursing;OT   PT Frequency 1-2x/wk   Discharge Recommendation   Rehab Resource Intensity Level, PT III (Minimum Resource Intensity)  (can consider OPPT for higher level balance if interested)   AM-PAC Basic Mobility Inpatient   Turning in Flat Bed Without Bedrails 4   Lying on Back to Sitting on Edge of Flat Bed Without Bedrails 4   Moving Bed to Chair 3   Standing Up From Chair Using Arms 3   Walk in Room 3   Climb 3-5 Stairs With Railing 3   Basic Mobility Inpatient Raw Score 20   Basic Mobility Standardized Score 43.99   [facility] Highest Level Of Mobility   JH-HLM Goal 6: Walk 10 steps or more   JH-HLM Achieved 7: Walk 25 feet or more   End of Consult   Patient Position at End of Consult Supine;Bed/Chair alarm activated;All needs within reach;Other (comment)  (PCA present)   History: co - morbidities including age, current experience including fall risk, multiple lines  Exam: impairments in systems including multiple body structures involved; neuromuscular (balance, gait, transfers), cardiopulmonary (vitals), cognition; activity limitations  (difficulties executing an action); participation restrictions (problems associated w involvement in life situations), AM-PAC  Clinical: stable/unpredictable  Complexity: moderate    Josefina Ybarra, PT

## 2024-11-26 NOTE — ASSESSMENT & PLAN NOTE
Chief complaint -likely due to new heart failure with pleural effusion and likely precipitated by tachyarrhythmia

## 2024-11-26 NOTE — PLAN OF CARE

## 2024-11-27 ENCOUNTER — ANESTHESIA (INPATIENT)
Dept: NON INVASIVE DIAGNOSTICS | Facility: HOSPITAL | Age: 89
DRG: 308 | End: 2024-11-27
Payer: COMMERCIAL

## 2024-11-27 ENCOUNTER — APPOINTMENT (INPATIENT)
Dept: NON INVASIVE DIAGNOSTICS | Facility: HOSPITAL | Age: 89
DRG: 308 | End: 2024-11-27
Attending: INTERNAL MEDICINE
Payer: COMMERCIAL

## 2024-11-27 PROBLEM — Z90.710 HISTORY OF HYSTERECTOMY: Status: ACTIVE | Noted: 2024-11-27

## 2024-11-27 LAB
ANION GAP SERPL CALCULATED.3IONS-SCNC: 7 MMOL/L (ref 4–13)
ATRIAL RATE: 72 BPM
ATRIAL RATE: 72 BPM
ATRIAL RATE: 94 BPM
BUN SERPL-MCNC: 16 MG/DL (ref 5–25)
CALCIUM SERPL-MCNC: 8.3 MG/DL (ref 8.4–10.2)
CHLORIDE SERPL-SCNC: 103 MMOL/L (ref 96–108)
CO2 SERPL-SCNC: 28 MMOL/L (ref 21–32)
CREAT SERPL-MCNC: 0.75 MG/DL (ref 0.6–1.3)
ERYTHROCYTE [DISTWIDTH] IN BLOOD BY AUTOMATED COUNT: 13.3 % (ref 11.6–15.1)
GFR SERPL CREATININE-BSD FRML MDRD: 71 ML/MIN/1.73SQ M
GLUCOSE SERPL-MCNC: 99 MG/DL (ref 65–140)
HCT VFR BLD AUTO: 40.3 % (ref 34.8–46.1)
HGB BLD-MCNC: 13.1 G/DL (ref 11.5–15.4)
MAGNESIUM SERPL-MCNC: 1.7 MG/DL (ref 1.9–2.7)
MCH RBC QN AUTO: 29 PG (ref 26.8–34.3)
MCHC RBC AUTO-ENTMCNC: 32.5 G/DL (ref 31.4–37.4)
MCV RBC AUTO: 89 FL (ref 82–98)
P AXIS: 36 DEGREES
PLATELET # BLD AUTO: 186 THOUSANDS/UL (ref 149–390)
PMV BLD AUTO: 12 FL (ref 8.9–12.7)
POTASSIUM SERPL-SCNC: 3.6 MMOL/L (ref 3.5–5.3)
PR INTERVAL: 162 MS
QRS AXIS: 105 DEGREES
QRS AXIS: 108 DEGREES
QRS AXIS: 111 DEGREES
QRS AXIS: 119 DEGREES
QRSD INTERVAL: 82 MS
QRSD INTERVAL: 84 MS
QRSD INTERVAL: 86 MS
QRSD INTERVAL: 86 MS
QT INTERVAL: 362 MS
QT INTERVAL: 474 MS
QT INTERVAL: 486 MS
QT INTERVAL: 496 MS
QTC INTERVAL: 448 MS
QTC INTERVAL: 519 MS
QTC INTERVAL: 532 MS
QTC INTERVAL: 566 MS
RBC # BLD AUTO: 4.51 MILLION/UL (ref 3.81–5.12)
SL CV LV EF: 20
SODIUM SERPL-SCNC: 138 MMOL/L (ref 135–147)
T WAVE AXIS: 125 DEGREES
T WAVE AXIS: 146 DEGREES
T WAVE AXIS: 149 DEGREES
T WAVE AXIS: 193 DEGREES
TR MAX PG: 23 MMHG
TR PEAK VELOCITY: 2.4 M/S
TRICUSPID VALVE PEAK REGURGITATION VELOCITY: 2.38 M/S
VENTRICULAR RATE: 72 BPM
VENTRICULAR RATE: 72 BPM
VENTRICULAR RATE: 78 BPM
VENTRICULAR RATE: 92 BPM
WBC # BLD AUTO: 6.84 THOUSAND/UL (ref 4.31–10.16)

## 2024-11-27 PROCEDURE — 93320 DOPPLER ECHO COMPLETE: CPT | Performed by: STUDENT IN AN ORGANIZED HEALTH CARE EDUCATION/TRAINING PROGRAM

## 2024-11-27 PROCEDURE — 80048 BASIC METABOLIC PNL TOTAL CA: CPT | Performed by: PHYSICIAN ASSISTANT

## 2024-11-27 PROCEDURE — 93005 ELECTROCARDIOGRAM TRACING: CPT

## 2024-11-27 PROCEDURE — 85027 COMPLETE CBC AUTOMATED: CPT | Performed by: PHYSICIAN ASSISTANT

## 2024-11-27 PROCEDURE — 99232 SBSQ HOSP IP/OBS MODERATE 35: CPT | Performed by: HOSPITALIST

## 2024-11-27 PROCEDURE — 99232 SBSQ HOSP IP/OBS MODERATE 35: CPT | Performed by: STUDENT IN AN ORGANIZED HEALTH CARE EDUCATION/TRAINING PROGRAM

## 2024-11-27 PROCEDURE — 92960 CARDIOVERSION ELECTRIC EXT: CPT

## 2024-11-27 PROCEDURE — 83735 ASSAY OF MAGNESIUM: CPT | Performed by: PHYSICIAN ASSISTANT

## 2024-11-27 PROCEDURE — 93325 DOPPLER ECHO COLOR FLOW MAPG: CPT | Performed by: STUDENT IN AN ORGANIZED HEALTH CARE EDUCATION/TRAINING PROGRAM

## 2024-11-27 PROCEDURE — 93010 ELECTROCARDIOGRAM REPORT: CPT | Performed by: STUDENT IN AN ORGANIZED HEALTH CARE EDUCATION/TRAINING PROGRAM

## 2024-11-27 PROCEDURE — 93312 ECHO TRANSESOPHAGEAL: CPT | Performed by: STUDENT IN AN ORGANIZED HEALTH CARE EDUCATION/TRAINING PROGRAM

## 2024-11-27 PROCEDURE — 93312 ECHO TRANSESOPHAGEAL: CPT

## 2024-11-27 PROCEDURE — 92960 CARDIOVERSION ELECTRIC EXT: CPT | Performed by: STUDENT IN AN ORGANIZED HEALTH CARE EDUCATION/TRAINING PROGRAM

## 2024-11-27 RX ORDER — KETAMINE HCL IN NACL, ISO-OSM 100MG/10ML
SYRINGE (ML) INJECTION AS NEEDED
Status: DISCONTINUED | OUTPATIENT
Start: 2024-11-27 | End: 2024-11-27

## 2024-11-27 RX ORDER — SODIUM CHLORIDE 9 MG/ML
INJECTION, SOLUTION INTRAVENOUS CONTINUOUS PRN
Status: DISCONTINUED | OUTPATIENT
Start: 2024-11-27 | End: 2024-11-27

## 2024-11-27 RX ORDER — MAGNESIUM SULFATE HEPTAHYDRATE 40 MG/ML
2 INJECTION, SOLUTION INTRAVENOUS ONCE
Status: COMPLETED | OUTPATIENT
Start: 2024-11-27 | End: 2024-11-27

## 2024-11-27 RX ORDER — PROPOFOL 10 MG/ML
INJECTION, EMULSION INTRAVENOUS AS NEEDED
Status: DISCONTINUED | OUTPATIENT
Start: 2024-11-27 | End: 2024-11-27

## 2024-11-27 RX ORDER — LOSARTAN POTASSIUM 25 MG/1
25 TABLET ORAL DAILY
Status: DISCONTINUED | OUTPATIENT
Start: 2024-11-28 | End: 2024-11-28 | Stop reason: HOSPADM

## 2024-11-27 RX ORDER — AMIODARONE HYDROCHLORIDE 200 MG/1
200 TABLET ORAL
Status: DISCONTINUED | OUTPATIENT
Start: 2024-12-04 | End: 2024-11-28 | Stop reason: HOSPADM

## 2024-11-27 RX ORDER — GLYCOPYRROLATE 0.2 MG/ML
INJECTION INTRAMUSCULAR; INTRAVENOUS AS NEEDED
Status: DISCONTINUED | OUTPATIENT
Start: 2024-11-27 | End: 2024-11-27

## 2024-11-27 RX ORDER — AMIODARONE HYDROCHLORIDE 200 MG/1
400 TABLET ORAL 2 TIMES DAILY WITH MEALS
Status: DISCONTINUED | OUTPATIENT
Start: 2024-11-27 | End: 2024-11-28 | Stop reason: HOSPADM

## 2024-11-27 RX ORDER — PROPOFOL 10 MG/ML
INJECTION, EMULSION INTRAVENOUS CONTINUOUS PRN
Status: DISCONTINUED | OUTPATIENT
Start: 2024-11-27 | End: 2024-11-27

## 2024-11-27 RX ADMIN — LATANOPROST 1 DROP: 50 SOLUTION OPHTHALMIC at 21:28

## 2024-11-27 RX ADMIN — Medication 10 MG: at 09:05

## 2024-11-27 RX ADMIN — APIXABAN 5 MG: 5 TABLET, FILM COATED ORAL at 21:26

## 2024-11-27 RX ADMIN — Medication 10 MG: at 08:59

## 2024-11-27 RX ADMIN — AMIODARONE HYDROCHLORIDE 400 MG: 200 TABLET ORAL at 16:39

## 2024-11-27 RX ADMIN — MAGNESIUM SULFATE HEPTAHYDRATE 2 G: 40 INJECTION, SOLUTION INTRAVENOUS at 10:40

## 2024-11-27 RX ADMIN — ATORVASTATIN CALCIUM 20 MG: 20 TABLET, FILM COATED ORAL at 08:07

## 2024-11-27 RX ADMIN — DESVENLAFAXINE 50 MG: 50 TABLET, EXTENDED RELEASE ORAL at 08:08

## 2024-11-27 RX ADMIN — APIXABAN 5 MG: 5 TABLET, FILM COATED ORAL at 08:07

## 2024-11-27 RX ADMIN — GLYCOPYRROLATE 0.1 MG: 0.2 INJECTION, SOLUTION INTRAMUSCULAR; INTRAVENOUS at 08:59

## 2024-11-27 RX ADMIN — SODIUM CHLORIDE: 9 INJECTION, SOLUTION INTRAVENOUS at 08:38

## 2024-11-27 RX ADMIN — PROPOFOL 20 MCG/KG/MIN: 10 INJECTION, EMULSION INTRAVENOUS at 09:00

## 2024-11-27 RX ADMIN — PROPOFOL 40 MG: 10 INJECTION, EMULSION INTRAVENOUS at 08:53

## 2024-11-27 RX ADMIN — METOPROLOL SUCCINATE 50 MG: 50 TABLET, EXTENDED RELEASE ORAL at 08:07

## 2024-11-27 RX ADMIN — AMIODARONE HYDROCHLORIDE 400 MG: 200 TABLET ORAL at 10:44

## 2024-11-27 RX ADMIN — Medication 1 TABLET: at 08:07

## 2024-11-27 NOTE — ASSESSMENT & PLAN NOTE
History of anxiety/depression hyperlipidemia glaucoma with recent findings of atrial fibrillation presents to the hospital with worsening shortness of breath  Her PCP Dr. Oswald retired and she recently establish care with [facility]  During first visit was noted to have atrial fibrillation and started on apixaban.  Patient stopped taking after several weeks due to shortness of breath as she thought this was the cause  Cardiology following  Status post cardioversion successfully  Continue metoprolol, apixaban.  Also started on amiodarone, 400 mg twice daily x 7 days, followed by 200 mg daily

## 2024-11-27 NOTE — ANESTHESIA POSTPROCEDURE EVALUATION
Post-Op Assessment Note    CV Status:  Stable    Pain management: adequate       Mental Status:  Alert and awake   Hydration Status:  Euvolemic   PONV Controlled:  Controlled   Airway Patency:  Patent     Post Op Vitals Reviewed: Yes    No anethesia notable event occurred.    Staff: CRNA           Last Filed PACU Vitals:  Vitals Value Taken Time   Temp 98.1    Pulse 73    BP 94/61    Resp 12    SpO2 99        Modified Alexandru:  Activity: 2 (11/27/2024  9:51 AM)  Respiration: 2 (11/27/2024  9:51 AM)  Circulation: 2 (11/27/2024  9:51 AM)  Consciousness: 2 (11/27/2024  9:51 AM)  Oxygen Saturation: 1 (11/27/2024  9:51 AM)  Modified Alexandru Score: 9 (11/27/2024  9:51 AM)

## 2024-11-27 NOTE — ASSESSMENT & PLAN NOTE
Secondary to rapid atrial fibrillation but also possibly pleural effusion  Give 1 dose of IV furosemide.  Give additional Lasix  Currently still requiring intermittently 2 L    Results from last 7 days   Lab Units 11/25/24  1555   BNP pg/mL 571*

## 2024-11-27 NOTE — CASE MANAGEMENT
Case Management Discharge Planning Note    Patient name Duong Reece  Location [facility] 4 /E4 -* MRN [id number]  : 1935 Date 2024       Current Admission Date: 2024  Current Admission Diagnosis:Rapid atrial fibrillation (HCC)   Patient Active Problem List    Diagnosis Date Noted Date Diagnosed    History of hysterectomy 2024     Acute congestive heart failure (HCC) 2024     Rapid atrial fibrillation (HCC) 2024     Acute pulmonary insufficiency 2024     Urticaria, acute 2023     Current mild episode of major depressive disorder without prior episode (HCC) 2020     Overweight (BMI 25.0-29.9) 2019     Hyperglycemia 2018     Cervical disc disease 2018     Pain of right heel 2017     Benign paroxysmal positional vertigo 06/15/2016     Depression with anxiety 2015     Osteopenia 2014     Glaucoma 2013     Esophagitis, reflux 2012     Mixed hyperlipidemia 2012     Anxiety 2012       LOS (days): 2  Geometric Mean LOS (GMLOS) (days): 3.4  Days to GMLOS:1.5     OBJECTIVE:  Risk of Unplanned Readmission Score: 10.54         Current admission status: Inpatient   Preferred Pharmacy:   [facility]/pharmacy #[id number] - [place] - [address] [place] [address]  [place] [address]  Phone: [phone] Fax: [phone]    Primary Care Provider: Duong Oliveros    Primary Insurance: [facility]  Secondary Insurance:     DISCHARGE DETAILS:    Discharge planning discussed with:: Patient  Freedom of Choice: Yes  Comments - Freedom of Choice: OPPT list provided  CM contacted family/caregiver?: Yes  Were Treatment Team discharge recommendations reviewed with patient/caregiver?: Yes  Did patient/caregiver verbalize understanding of patient care needs?: Yes  Were patient/caregiver advised of the risks associated with not following Treatment Team discharge recommendations?: Yes    Contacts  Patient Contacts: Son at  bedside  Relationship to Patient:: Family  Contact Method: In Person  Reason/Outcome: Continuity of Care    Requested Home Health Care         Is the patient interested in HHC at discharge?: No    DME Referral Provided  Referral made for DME?: No    Other Referral/Resources/Interventions Provided:  Interventions: Outpatient PT    Would you like to participate in our [facility] service program?  : No - Declined    Treatment Team Recommendation: Home  Discharge Destination Plan:: Home  Transport at Discharge : Family                             IMM Given (Date):: 11/27/24  IMM Given to:: Patient          IMM reviewed with patient, patient agrees with discharge determination.    CM met with patient at bedside, patient hopeful to DC home tomorrow, her daughter will transport her.  CM discussed OPPT with patient, location list provided. Patient does not anticipate any additional needs, CM department remains available.

## 2024-11-27 NOTE — ASSESSMENT & PLAN NOTE
Wt Readings from Last 3 Encounters:   11/27/24 67.6 kg (149 lb)   02/26/24 64.8 kg (142 lb 12.8 oz)   02/24/23 64.8 kg (142 lb 12.8 oz)     Secondary to A-fib RVR  Echo with EF 20% and severe dilation, as well as moderate MR, moderate TR  CXR with small effusions

## 2024-11-27 NOTE — PROGRESS NOTES
Progress Note - Hospitalist   Name: Duong Reece 89 y.o. female I MRN: [id number]  Unit/Bed#: E4 -01 I Date of Admission: 11/25/2024   Date of Service: 11/27/2024 I Hospital Day: 2    Assessment & Plan  Rapid atrial fibrillation (HCC)  History of anxiety/depression hyperlipidemia glaucoma with recent findings of atrial fibrillation presents to the hospital with worsening shortness of breath  Her PCP Dr. Oswald retired and she recently establish care with [facility]  During first visit was noted to have atrial fibrillation and started on apixaban.  Patient stopped taking after several weeks due to shortness of breath as she thought this was the cause  Cardiology following  Status post cardioversion successfully  Continue metoprolol, apixaban.  Also started on amiodarone, 400 mg twice daily x 7 days, followed by 200 mg daily  Acute pulmonary insufficiency  Secondary to rapid atrial fibrillation but also possibly pleural effusion  Give 1 dose of IV furosemide.  Give additional Lasix  Currently still requiring intermittently 2 L    Results from last 7 days   Lab Units 11/25/24  1555   BNP pg/mL 571*     Depression with anxiety  Continue alprazolam.  Confirmed on   Glaucoma  Continue eyedrops  Mixed hyperlipidemia  Continue atorvastatin  Acute congestive heart failure (HCC)  Wt Readings from Last 3 Encounters:   11/27/24 67.6 kg (149 lb)   02/26/24 64.8 kg (142 lb 12.8 oz)   02/24/23 64.8 kg (142 lb 12.8 oz)     Secondary to A-fib RVR  Echo with EF 20% and severe dilation, as well as moderate MR, moderate TR  CXR with small effusions  History of hysterectomy  Noted    VTE Pharmacologic Prophylaxis: VTE Score: 3 Moderate Risk (Score 3-4) - Pharmacological DVT Prophylaxis Ordered: apixaban (Eliquis).    Mobility:   Basic Mobility Inpatient Raw Score: 20  JH-HLM Goal: 6: Walk 10 steps or more  JH-HLM Achieved: 6: Walk 10 steps or more  JH-HLM Goal achieved. Continue to encourage appropriate  "mobility.    Patient Centered Rounds: I performed bedside rounds with nursing staff today.   Discussions with Specialists or Other Care Team Provider: Cards    Education and Discussions with Family / Patient: Attempted to update  (daughter) via phone. Left voicemail.     Current Length of Stay: 2 day(s)  Current Patient Status: Inpatient   Certification Statement: The patient will continue to require additional inpatient hospital stay due to cardiac monitoring s/p cardioversion  Discharge Plan: Tomorrow    Code Status: Level 1 - Full Code    Subjective   Patient had an episode last night where she reported \"feeling like she is dying\" with associated chest pain and shortness of breath.  Troponin and D-dimer were negative.  Resolved on its own.  Possibly anxiety related.  Underwent cardioversion today without any recurrence of A-fib or feeling of dying.    Objective :  Temp:  [97.3 °F (36.3 °C)-98.8 °F (37.1 °C)] 98.2 °F (36.8 °C)  HR:  [59-88] 59  BP: ()/(56-92) 116/92  Resp:  [16-18] 16  SpO2:  [93 %-98 %] 98 %  O2 Device: Nasal cannula  Nasal Cannula O2 Flow Rate (L/min):  [2 L/min] 2 L/min    Body mass index is 26.39 kg/m².     Input and Output Summary (last 24 hours):     Intake/Output Summary (Last 24 hours) at 11/27/2024 1355  Last data filed at 11/27/2024 0953  Gross per 24 hour   Intake 928.75 ml   Output 300 ml   Net 628.75 ml       Physical Exam  Vitals and nursing note reviewed.   Constitutional:       Appearance: Normal appearance.   HENT:      Head: Normocephalic and atraumatic.      Mouth/Throat:      Mouth: Mucous membranes are moist.      Pharynx: Oropharynx is clear. No oropharyngeal exudate.   Eyes:      Extraocular Movements: Extraocular movements intact.   Cardiovascular:      Rate and Rhythm: Normal rate and regular rhythm.      Pulses: Normal pulses.      Heart sounds: Normal heart sounds. No murmur heard.     No friction rub. No gallop.   Pulmonary:      Effort: Pulmonary " effort is normal. No respiratory distress.      Breath sounds: Normal breath sounds. No stridor. No wheezing or rales.   Abdominal:      General: Abdomen is flat. Bowel sounds are normal. There is no distension.      Palpations: Abdomen is soft.      Tenderness: There is no abdominal tenderness.   Musculoskeletal:      Right lower leg: No edema.      Left lower leg: No edema.   Skin:     General: Skin is warm and dry.   Neurological:      General: No focal deficit present.      Mental Status: She is alert and oriented to person, place, and time.           Lines/Drains:        Telemetry:  Telemetry Orders (From admission, onward)               24 Hour Telemetry Monitoring  (ED Bridging Orders Panel)  Continuous x 24 Hours (Telem)        Expiring   Question:  Reason for 24 Hour Telemetry  Answer:  Arrhythmias requiring acute medical intervention / PPM or ICD malfunction                     Telemetry Reviewed: Normal Sinus Rhythm  Indication for Continued Telemetry Use: Post cardioversion               Lab Results: I have reviewed the following results:   Results from last 7 days   Lab Units 11/27/24  0555 11/26/24  0340 11/25/24  1555   WBC Thousand/uL 6.84   < > 7.17   HEMOGLOBIN g/dL 13.1   < > 15.1   HEMATOCRIT % 40.3   < > 47.1*   PLATELETS Thousands/uL 186   < > 233   SEGS PCT %  --   --  55   LYMPHO PCT %  --   --  36   MONO PCT %  --   --  9   EOS PCT %  --   --  0    < > = values in this interval not displayed.     Results from last 7 days   Lab Units 11/27/24  0555 11/26/24  0610   SODIUM mmol/L 138 141   POTASSIUM mmol/L 3.6 3.5   CHLORIDE mmol/L 103 105   CO2 mmol/L 28 29   BUN mg/dL 16 20   CREATININE mg/dL 0.75 0.77   ANION GAP mmol/L 7 7   CALCIUM mg/dL 8.3* 9.0   ALBUMIN g/dL  --  3.6   TOTAL BILIRUBIN mg/dL  --  0.79   ALK PHOS U/L  --  87   ALT U/L  --  17   AST U/L  --  28   GLUCOSE RANDOM mg/dL 99 103                 Results from last 7 days   Lab Units 11/26/24  0340   PROCALCITONIN ng/ml 0.08        Recent Cultures (last 7 days):         Imaging Results Review: No pertinent imaging studies reviewed.  Other Study Results Review: No additional pertinent studies reviewed.    Last 24 Hours Medication List:     Current Facility-Administered Medications:     acetaminophen (TYLENOL) tablet 650 mg, Q4H PRN    ALPRAZolam (XANAX) tablet 0.5 mg, TID PRN    [START ON 12/4/2024] amiodarone tablet 200 mg, Daily With Breakfast    amiodarone tablet 400 mg, BID With Meals    apixaban (ELIQUIS) tablet 5 mg, BID    atorvastatin (LIPITOR) tablet 20 mg, Daily    desvenlafaxine succinate (PRISTIQ) 24 hr tablet 50 mg, Daily    latanoprost (XALATAN) 0.005 % ophthalmic solution 1 drop, HS    metoprolol succinate (TOPROL-XL) 24 hr tablet 50 mg, Q12H    multivitamin-minerals (CENTRUM) tablet 1 tablet, Daily    ondansetron (ZOFRAN) injection 4 mg, Q4H PRN    Administrative Statements   Today, Patient Was Seen By: Brad Blanco PA-C      **Please Note: This note may have been constructed using a voice recognition system.**

## 2024-11-27 NOTE — UTILIZATION REVIEW
"SEE INITIAL REVIEW AT BOTTOM    Continued Stay Review    Date: 11/27                          Current Patient Class: Inpatient  Current Level of Care: Med Surg    HPI:89 y.o. female initially admitted on      Assessment/Plan: Date: 11/27  Day 3: Has surpassed a 2nd midnight with active treatments and services.  Per Cardiology; Acute heart failure with reduced EF  S/p successful ROSA cardioversion this morning. D/c Cardizem drip.  Continue with amiodarone 400 mg twice daily for 7 days and then changed to 200 mg daily on 12/4.  Tele monitoring. Try to add other GDMT like ARB or ARNI prior to discharge  Pt had a panic attack overnight and felt like she was dying. Vitals and telemetry were unremarkable. She reports feeling better currently.     Progress notes; Had successful TE cardioversion today.  S/p Iv Cardizem drip. Amiodarone started per Cardiology.   Continue metoprolol, apixaban.   Pt had an episode last night where she reported \"feeling like she is dying\" with associated chest pain and shortness of breath. Troponin and D-dimer were negative. Resolved on its own. Possibly anxiety related     Medications:   Scheduled Medications:  [START ON 12/4/2024] amiodarone, 200 mg, Oral, Daily With Breakfast  amiodarone, 400 mg, Oral, BID With Meals  apixaban, 5 mg, Oral, BID  atorvastatin, 20 mg, Oral, Daily  desvenlafaxine succinate, 50 mg, Oral, Daily  latanoprost, 1 drop, Both Eyes, HS  magnesium sulfate, 2 g, Intravenous, Once  metoprolol succinate, 50 mg, Oral, Q12H  multivitamin-minerals, 1 tablet, Oral, Daily      Continuous IV Infusions:   diltiazem (CARDIZEM) 125 mg in sodium chloride 0.9 % 125 mL infusion  Rate: 1-15 mL/hr Dose: 1-15 mg/hr  Freq: Titrated Route: IV  Last Dose: 5 mg/hr (11/26/24 0804)  Start: 11/25/24 1630 End: 11/27/24 0924    PRN Meds:  acetaminophen, 650 mg, Oral, Q4H PRN  ALPRAZolam, 0.5 mg, Oral, TID PRN  ondansetron, 4 mg, Intravenous, Q4H PRN      Discharge Plan: TBD    Vital Signs (last 3 " days)       Date/Time Temp Pulse Resp BP MAP (mmHg) SpO2 Calculated FIO2 (%) - Nasal Cannula Nasal Cannula O2 Flow Rate (L/min) O2 Device Cardiac (WDL) Patient Position - Orthostatic VS [place] Coma Scale Score Pain    11/27/24 0946 -- 88 -- 94/61 -- -- -- -- -- -- -- -- --    11/27/24 0930 98.1 °F (36.7 °C) 88 17 94/61 71 97 % -- -- None (Room air) X -- -- No Pain    11/27/24 0754 97.7 °F (36.5 °C) 78 18 116/73 -- 94 % -- -- None (Room air) -- Lying -- --    11/27/24 0305 98.8 °F (37.1 °C) 74 18 100/58 74 96 % -- -- -- -- Lying -- --    11/26/24 2307 97.3 °F (36.3 °C) 82 18 94/57 70 93 % -- -- None (Room air) -- Lying -- --    11/26/24 1930 -- -- -- -- -- -- -- -- None (Room air) -- -- 15 No Pain    11/26/24 1917 98.4 °F (36.9 °C) 84 18 133/78 91 95 % -- -- None (Room air) -- Lying -- --    11/26/24 1530 97.5 °F (36.4 °C) 81 18 101/56 81 96 % -- -- None (Room air) -- Lying -- --    11/26/24 1104 98.7 °F (37.1 °C) 97 19 111/88 93 99 % -- -- None (Room air) -- Lying -- No Pain    11/26/24 1052 -- -- -- -- -- -- -- -- -- -- -- -- No Pain    11/26/24 0804 -- 112 -- -- -- -- -- -- -- -- -- -- --    11/26/24 0747 -- -- -- -- -- -- -- -- -- -- -- 15 --    11/26/24 0744 -- -- -- -- -- -- -- -- -- -- -- -- 3    11/26/24 0743 97.8 °F (36.6 °C) 100 21 147/79 94 94 % -- -- None (Room air) -- Lying -- --    11/26/24 0312 98 °F (36.7 °C) 105 20 127/73 91 93 % -- -- None (Room air) -- Lying -- --    11/26/24 0214 97.5 °F (36.4 °C) 99 20 126/79 -- 96 % -- -- None (Room air) -- Sitting -- 6    11/25/24 2255 97.6 °F (36.4 °C) 74 16 112/81 93 94 % -- -- None (Room air) -- Lying -- --    11/25/24 1948 -- -- -- -- -- -- -- -- -- -- -- 15 No Pain    11/25/24 1939 97.8 °F (36.6 °C) 62 16 112/96 101 92 % -- -- None (Room air) -- Sitting -- --    11/25/24 1915 -- 99 16 127/76 93 98 % 28 2 L/min Nasal cannula -- Lying -- --    11/25/24 1900 -- 105 18 132/103 -- 98 % -- -- None (Room air) -- Lying -- --    11/25/24 1845 -- 104 20 -- -- 98 %  -- -- -- -- -- -- --    11/25/24 1815 -- 100 18 120/65 89 97 % -- -- None (Room air) -- Lying -- --    11/25/24 1745 -- 96 20 162/80 104 96 % -- -- None (Room air) -- Lying -- --    11/25/24 1715 -- 98 22 122/79 93 98 % -- -- None (Room air) -- Lying -- --    11/25/24 1700 -- 95 20 120/84 98 98 % -- -- None (Room air) -- Lying -- --    11/25/24 1645 -- 100 22 124/90 99 98 % -- -- None (Room air) -- Lying -- --    11/25/24 1637 -- 108 -- -- -- -- -- -- -- -- -- -- --    11/25/24 1630 -- 148 26 127/93 103 100 % -- -- None (Room air) -- Lying -- --    11/25/24 1556 -- -- -- -- -- -- -- -- -- -- -- 15 --    11/25/24 1550 98.2 °F (36.8 °C) 161 20 -- -- 100 % -- -- None (Room air) -- Lying -- --          Weight (last 2 days)       Date/Time Weight    11/27/24 0946 67.6 (149)    11/27/24 0700 67.8 (149.47)    11/26/24 1104 67.6 (149)    11/26/24 0542 68 (149.91)    11/25/24 1939 68.1 (150.13)    11/25/24 1550 68.1 (150.13)            Pertinent Labs/Diagnostic Results:   Radiology:  XR chest 1 view portable   ED Interpretation by Leroy Nieves MD (11/25 9545)   Independently reviewed bilateral basilar opacities and effusions      Final Interpretation by Brad Camacho MD (11/25 0205)      Bibasilar pulmonary densities with appearance favoring atelectasis although pneumonia should be considered in the appropriate clinical scenario.      Small bilateral right greater than left pleural effusions are noted as well.      The study was marked in EPIC for immediate notification.            Workstation performed: [id number]           Cardiology:  ROSA    by KRUPA Hunt (11/27 0946)      ECG 12 lead   Final Result by Tacos Smith MD (11/27 0817)   Atrial fibrillation   Rightward axis   Pulmonary disease pattern   T wave abnormality, consider lateral ischemia   Abnormal ECG   Confirmed by Tacos Smith ([id number]) on 11/27/2024 8:17:29 AM      Echo complete w/ contrast if indicated   Final Result by Jennifer  DO Adrian (11/26 1527)        Left Ventricle: Left ventricular cavity size is severely dilated. The    left ventricular ejection fraction is 20%. Systolic function is severely    reduced. There is severe global hypokinesis.     Left Atrium: The atrium is severely dilated.     Right Atrium: The atrium is severely dilated.     Aortic Valve: There is aortic valve sclerosis.     Mitral Valve: There is moderate regurgitation.     Tricuspid Valve: There is moderate regurgitation.     Aorta: The aortic root is normal in size. The ascending aorta is mildly    dilated.      There are no prior studies available for comparison.            ECG 12 lead   Final Result by Elton Garza DO (11/26 1033)   Atrial fibrillation   ST & T wave abnormality, consider lateral ischemia   Prolonged QT   Abnormal ECG   Confirmed by Elton Garza ([id number]) on 11/26/2024 10:33:16 AM      ECG 12 lead   Final Result by Elton Garza DO (11/26 1033)   Atrial fibrillation with rapid ventricular response   Rightward axis   Nonspecific T wave abnormality   Abnormal ECG   Confirmed by Elton Garza ([id number]) on 11/26/2024 10:33:08 AM        GI:  No orders to display           Results from last 7 days   Lab Units 11/27/24  0555 11/26/24  0340 11/25/24  1555   WBC Thousand/uL 6.84 5.53 7.17   HEMOGLOBIN g/dL 13.1 14.3 15.1   HEMATOCRIT % 40.3 44.7 47.1*   PLATELETS Thousands/uL 186 219 233   TOTAL NEUT ABS Thousands/µL  --   --  3.89         Results from last 7 days   Lab Units 11/27/24  0555 11/26/24  0610 11/25/24  1555   SODIUM mmol/L 138 141 140   POTASSIUM mmol/L 3.6 3.5 4.0   CHLORIDE mmol/L 103 105 104   CO2 mmol/L 28 29 30   ANION GAP mmol/L 7 7 6   BUN mg/dL 16 20 25   CREATININE mg/dL 0.75 0.77 0.87   EGFR ml/min/1.73sq m 71 69 60   CALCIUM mg/dL 8.3* 9.0 10.3*   MAGNESIUM mg/dL 1.7*  --   --      Results from last 7 days   Lab Units 11/26/24  0610 11/25/24  1555   AST U/L 28 30   ALT U/L 17 22   ALK PHOS U/L 87 105*   TOTAL PROTEIN  g/dL 6.1* 6.6   ALBUMIN g/dL 3.6 3.9   TOTAL BILIRUBIN mg/dL 0.79 0.66         Results from last 7 days   Lab Units 11/27/24  0555 11/26/24  0610 11/25/24  1555   GLUCOSE RANDOM mg/dL 99 103 118       Results from last 7 days   Lab Units 11/26/24  0340 11/25/24  1958 11/25/24  1815 11/25/24  1555   HS TNI 0HR ng/L 21  --   --  24   HS TNI 2HR ng/L  --   --  23  --    HSTNI D2 ng/L  --   --  -1  --    HS TNI 4HR ng/L  --  21  --   --    HSTNI D4 ng/L  --  -3  --   --      Results from last 7 days   Lab Units 11/26/24  1710   D-DIMER QUANTITATIVE ug/ml FEU 0.53*         Results from last 7 days   Lab Units 11/26/24  0340   TSH 3RD GENERATON uIU/mL 1.934     Results from last 7 days   Lab Units 11/26/24  0340   PROCALCITONIN ng/ml 0.08                 Results from last 7 days   Lab Units 11/25/24  1555   BNP pg/mL 571*       Network Utilization Review Department  ATTENTION: Please call with any questions or concerns to [phone] and carefully listen to the prompts so that you are directed to the right person. All voicemails are confidential.   For Discharge needs, contact Care Management DC Support Team at [phone] opt. 2  Send all requests for admission clinical reviews, approved or denied determinations and any other requests to dedicated fax number below belonging to the [facility] where the patient is receiving treatment. List of dedicated fax numbers for the Facilities:  FACILITY NAME UR FAX NUMBER   ADMISSION DENIALS (Administrative/Medical Necessity) [phone]   DISCHARGE SUPPORT TEAM (NETWORK) [phone]   PARENT CHILD HEALTH (Maternity/NICU/Pediatrics) [phone]   [facility] [phone]   [facility] [phone]   [facility] [phone]   [facility] [phone]   [facility] [phone]   [facility] [phone]   [facility]  [facility] [phone]   [facility] [phone]   [facility] [phone]   [facility] [phone]   [facility] [phone]   [facility] [phone]

## 2024-11-27 NOTE — ANESTHESIA PREPROCEDURE EVALUATION
Procedure:  ROSA    Relevant Problems   ANESTHESIA (within normal limits)      CARDIO   (+) Acute congestive heart failure (HCC)   (+) Mixed hyperlipidemia   (+) Rapid atrial fibrillation (HCC)      ENDO (within normal limits)      GI/HEPATIC (within normal limits)      /RENAL (within normal limits)      GYN   (+) History of hysterectomy      HEMATOLOGY (within normal limits)      MUSCULOSKELETAL (within normal limits)      NEURO/PSYCH   (+) Anxiety   (+) Current mild episode of major depressive disorder without prior episode (HCC)   (+) Depression with anxiety      PULMONARY (within normal limits)        Physical Exam    Airway    Mallampati score: III  TM Distance: >3 FB  Neck ROM: full     Dental   No notable dental hx     Cardiovascular  Rhythm: regular, Rate: normal, Cardiovascular exam normal    Pulmonary  Pulmonary exam normal Breath sounds clear to auscultation    Other Findings  post-pubertal.      Anesthesia Plan  ASA Score- 3     Anesthesia Type- IV sedation with anesthesia with ASA Monitors.         Additional Monitors:     Airway Plan:     Comment: Plan: MAC sedation, PIVx1, standard ASA monitors. Spontaneous respirations with supplemental O2 via nasal cannula vs. Simple face mask.    Anesthesia MAC sedation plan and consent discussed with patient who expressed understanding and agreement. Risks/benefits and alternatives discussed with patient including possible PONV, airway obstruction requiring ventilation augmentation, regurgitant aspiration, awareness, damage to teeth/lips/gums and possibility of rare anesthetic and procedural emergencies requiring emergent airway management. Plan discussed and confirmed with CRNA.   .       Plan Factors-Exercise tolerance (METS): >4 METS.    Chart reviewed. EKG reviewed. Imaging results reviewed. Existing labs reviewed. Patient summary reviewed.    Patient is not a current smoker.  Patient instructed to abstain from smoking on day of procedure. Patient did not  smoke on day of surgery.    Obstructive sleep apnea risk education given perioperatively.        Induction- intravenous.    Postoperative Plan-     Perioperative Resuscitation Plan - Level 1 - Full Code.       Informed Consent- Anesthetic plan and risks discussed with patient.

## 2024-11-27 NOTE — PLAN OF CARE
Problem: Potential for Falls  Goal: Patient will remain free of falls  Description: INTERVENTIONS:  - Educate patient/family on patient safety including physical limitations  - Instruct patient to call for assistance with activity   - Consult OT/PT to assist with strengthening/mobility   - Keep Call bell within reach  - Keep bed low and locked with side rails adjusted as appropriate  - Keep care items and personal belongings within reach  - Initiate and maintain comfort rounds  - Make Fall Risk Sign visible to staff  - Offer Toileting every 2 Hours, in advance of need  - Initiate/Maintain bed alarm  - Obtain necessary fall risk management equipment: alarms  - Apply yellow socks and bracelet for high fall risk patients  - Consider moving patient to room near nurses station  Outcome: Progressing     Problem: PAIN - ADULT  Goal: Verbalizes/displays adequate comfort level or baseline comfort level  Description: Interventions:  - Encourage patient to monitor pain and request assistance  - Assess pain using appropriate pain scale  - Administer analgesics based on type and severity of pain and evaluate response  - Implement non-pharmacological measures as appropriate and evaluate response  - Consider cultural and social influences on pain and pain management  - Notify physician/advanced practitioner if interventions unsuccessful or patient reports new pain  Outcome: Progressing     Problem: INFECTION - ADULT  Goal: Absence or prevention of progression during hospitalization  Description: INTERVENTIONS:  - Assess and monitor for signs and symptoms of infection  - Monitor lab/diagnostic results  - Monitor all insertion sites, i.e. indwelling lines, tubes, and drains  - Monitor endotracheal if appropriate and nasal secretions for changes in amount and color  - [place] appropriate cooling/warming therapies per order  - Administer medications as ordered  - Instruct and encourage patient and family to use good hand hygiene  technique  - Identify and instruct in appropriate isolation precautions for identified infection/condition  Outcome: Progressing     Problem: SAFETY ADULT  Goal: Patient will remain free of falls  Description: INTERVENTIONS:  - Educate patient/family on patient safety including physical limitations  - Instruct patient to call for assistance with activity   - Consult OT/PT to assist with strengthening/mobility   - Keep Call bell within reach  - Keep bed low and locked with side rails adjusted as appropriate  - Keep care items and personal belongings within reach  - Initiate and maintain comfort rounds  - Make Fall Risk Sign visible to staff  - Offer Toileting every 2 Hours, in advance of need  - Initiate/Maintain bed alarm  - Obtain necessary fall risk management equipment: alarms  - Apply yellow socks and bracelet for high fall risk patients  - Consider moving patient to room near nurses station  Outcome: Progressing  Goal: Maintain or return to baseline ADL function  Description: INTERVENTIONS:  -  Assess patient's ability to carry out ADLs; assess patient's baseline for ADL function and identify physical deficits which impact ability to perform ADLs (bathing, care of mouth/teeth, toileting, grooming, dressing, etc.)  - Assess/evaluate cause of self-care deficits   - Assess range of motion  - Assess patient's mobility; develop plan if impaired  - Assess patient's need for assistive devices and provide as appropriate  - Encourage maximum independence but intervene and supervise when necessary  - Involve family in performance of ADLs  - Assess for home care needs following discharge   - Consider OT consult to assist with ADL evaluation and planning for discharge  - Provide patient education as appropriate  Outcome: Progressing  Goal: Maintains/Returns to pre admission functional level  Description: INTERVENTIONS:  - Perform AM-PAC 6 Click Basic Mobility/ Daily Activity assessment daily.  - Set and communicate daily  mobility goal to care team and patient/family/caregiver.   - Collaborate with rehabilitation services on mobility goals if consulted  - Ambulate patient 3 times a day  - Out of bed to chair 3 times a day   - Out of bed for meals 3 times a day  - Out of bed for toileting  - Record patient progress and toleration of activity level   Outcome: Progressing     Problem: DISCHARGE PLANNING  Goal: Discharge to home or other facility with appropriate resources  Description: INTERVENTIONS:  - Identify barriers to discharge w/patient and caregiver  - Arrange for needed discharge resources and transportation as appropriate  - Identify discharge learning needs (meds, wound care, etc.)  - Refer to Case Management Department for coordinating discharge planning if the patient needs post-hospital services based on physician/advanced practitioner order or complex needs related to functional status, cognitive ability, or social support system  Outcome: Progressing     Problem: Knowledge Deficit  Goal: Patient/family/caregiver demonstrates understanding of disease process, treatment plan, medications, and discharge instructions  Description: Complete learning assessment and assess knowledge base.  Interventions:  - Provide teaching at level of understanding  - Provide teaching via preferred learning methods  Outcome: Progressing     Problem: CARDIOVASCULAR - ADULT  Goal: Maintains optimal cardiac output and hemodynamic stability  Description: INTERVENTIONS:  - Monitor I/O, vital signs and rhythm  - Monitor for S/S and trends of decreased cardiac output  - Administer and titrate ordered vasoactive medications to optimize hemodynamic stability  - Assess quality of pulses, skin color and temperature  - Assess for signs of decreased coronary artery perfusion  - Instruct patient to report change in severity of symptoms  Outcome: Progressing  Goal: Absence of cardiac dysrhythmias or at baseline rhythm  Description: INTERVENTIONS:  -  Continuous cardiac monitoring, vital signs, obtain 12 lead EKG if ordered  - Administer antiarrhythmic and heart rate control medications as ordered  - Monitor electrolytes and administer replacement therapy as ordered  Outcome: Progressing

## 2024-11-27 NOTE — PROGRESS NOTES
"Cardiology Progress Note - Duong Reece 89 y.o. 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 with RVR  - TTE 11/26/2024 showed EF 20%, severe LA and RA, AV sclerosis, moderate MR, moderate TR, mildly dilated ascending aorta  - MR and TR appeared better on ORSA on 11/27/2024  - GDMT Toprol-XL 50 mg twice daily  - Will try to add other GDMT prior to discharge    Depression with anxiety    Glaucoma    Mixed hyperlipidemia  - Continue with atorvastatin 20 mg daily    Acute pulmonary insufficiency    History of hysterectomy    Summary:  - Underwent successful ROSA cardioversion this morning  - Will discontinue diltiazem drip, and start patient on amiodarone  - Continue with amiodarone 400 mg twice daily for 7 days and then changed to 200 mg daily on 12/4   - Monitor on telemetry overnight  - Try to add other GDMT like ARB or ARNI prior to discharge    Subjective:   Patient had a panic attack overnight and felt like she was dying.  Vitals and telemetry were unremarkable.  She reports feeling better currently.  Denies chest pain, abdominal pain, nausea, vomiting, fever, chills, headache, dizziness or palpitations.    Objective:     Vitals: Blood pressure 116/73, pulse 78, temperature 97.7 °F (36.5 °C), temperature source Temporal, resp. rate 18, height 5' 3\" (1.6 m), weight 67.8 kg (149 lb 7.6 oz), SpO2 94%., Body mass index is 26.48 kg/m².,   Orthostatic Blood Pressures      Flowsheet Row Most " Recent Value   Blood Pressure 116/73 filed at 11/27/2024 0754   Patient Position - Orthostatic VS Lying filed at 11/27/2024 0754              Intake/Output Summary (Last 24 hours) at 11/27/2024 0929  Last data filed at 11/27/2024 0928  Gross per 24 hour   Intake 828.75 ml   Output 300 ml   Net 528.75 ml           Physical Exam:    GEN: Duong Reece appears well, alert and oriented x 3, pleasant and cooperative   HEENT: Mucous membranes moist, no scleral icterus, no conjunctival pallor  NECK: No elevated JVD  HEART: Regular rate, irregularly irregular rhythm, normal S1 and S2, 2/6 systolic murmur  LUNGS: clear to auscultation bilaterally; no wheezes, rales, or rhonchi   ABDOMEN: normal bowel sounds, soft, no tenderness, no distention  EXTREMITIES: peripheral pulses normal; no lower extremity edema   NEURO: no focal findings   SKIN: No lesions or rashes on exposed skin        Current Facility-Administered Medications:     acetaminophen (TYLENOL) tablet 650 mg, 650 mg, Oral, Q4H PRN, Kevin Elder DO    ALPRAZolam (XANAX) tablet 0.5 mg, 0.5 mg, Oral, TID PRN, Kevin Elder DO, 0.5 mg at 11/26/24 1857    apixaban (ELIQUIS) tablet 5 mg, 5 mg, Oral, BID, Garrett Gonsalves PA-C, 5 mg at 11/27/24 0807    atorvastatin (LIPITOR) tablet 20 mg, 20 mg, Oral, Daily, Kevin Elder DO, 20 mg at 11/27/24 0807    desvenlafaxine succinate (PRISTIQ) 24 hr tablet 50 mg, 50 mg, Oral, Daily, Kevin Elder DO, 50 mg at 11/27/24 0808    latanoprost (XALATAN) 0.005 % ophthalmic solution 1 drop, 1 drop, Both Eyes, HS, Kevin Elder DO, 1 drop at 11/26/24 2032    magnesium sulfate 2 g/50 mL IVPB (premix) 2 g, 2 g, Intravenous, Once, Brad Blanco PA-C    metoprolol succinate (TOPROL-XL) 24 hr tablet 50 mg, 50 mg, Oral, Q12H, Garrett Gonsalves PA-C, 50 mg at 11/27/24 0807    multivitamin-minerals (CENTRUM) tablet 1 tablet, 1 tablet, Oral, Daily, Kevin Elder DO, 1 tablet at 11/27/24 0807    ondansetron (ZOFRAN) injection 4 mg, 4 mg,  "Intravenous, Q4H PRN, Kevin Elder DO, 4 mg at 11/26/24 1857    Facility-Administered Medications Ordered in Other Encounters:     glycopyrrolate (ROBINUL) injection, , Intravenous, PRN, Bibiana Roman CRNA, 0.1 mg at 11/27/24 0859    Ketamine HCl, , Intravenous, PRN, Bibiana Roman CRNA, 10 mg at 11/27/24 0905    propofol (DIPRIVAN) 1000 mg in 100 mL infusion (premix), , Intravenous, Continuous PRN, Bibiana Roman CRNA, Stopped at 11/27/24 0917    propofol (DIPRIVAN) 200 MG/20ML bolus injection, , Intravenous, PRN, Bibiana Roman CRNA, 40 mg at 11/27/24 0853    sodium chloride 0.9 % infusion, , Intravenous, Continuous PRN, Bibiana Roman CRNA, Stopped at 11/27/24 0928    Labs & Results:    No results found for: \"CKTOTAL\", \"CKMB\", \"CKMBINDEX\", \"TROPONINI\"    Lab Results   Component Value Date    GLUCOSE 87 12/07/2015    CALCIUM 8.3 (L) 11/27/2024     12/07/2015    K 3.6 11/27/2024    CO2 28 11/27/2024     11/27/2024    BUN 16 11/27/2024    CREATININE 0.75 11/27/2024       Lab Results   Component Value Date    WBC 6.84 11/27/2024    HGB 13.1 11/27/2024    HCT 40.3 11/27/2024    MCV 89 11/27/2024     11/27/2024           Lab Results   Component Value Date    CHOL 202 12/07/2015    CHOL 185 06/05/2015     Lab Results   Component Value Date    HDL 54 02/26/2024    HDL 53 02/24/2023     Lab Results   Component Value Date    LDLCALC 105 (H) 02/26/2024    LDLCALC 115 (H) 02/24/2023     Lab Results   Component Value Date    TRIG 175 (H) 02/26/2024    TRIG 135 02/24/2023       Lab Results   Component Value Date    ALT 17 11/26/2024    AST 28 11/26/2024    ALKPHOS 87 11/26/2024         EKG personally reviewed by )Tacos Smith MD. No acute changes   TELE: Remained in atrial fibrillation overnight        "

## 2024-11-27 NOTE — PLAN OF CARE
Problem: Potential for Falls  Goal: Patient will remain free of falls  Description: INTERVENTIONS:  - Educate patient/family on patient safety including physical limitations  - Instruct patient to call for assistance with activity   - Consult OT/PT to assist with strengthening/mobility   - Keep Call bell within reach  - Keep bed low and locked with side rails adjusted as appropriate  - Keep care items and personal belongings within reach  - Initiate and maintain comfort rounds  - Make Fall Risk Sign visible to staff  - Offer Toileting every 2 Hours, in advance of need  - Initiate/Maintain bed alarm  - Obtain necessary fall risk management equipment: alarms  - Apply yellow socks and bracelet for high fall risk patients  - Consider moving patient to room near nurses station  Outcome: Progressing     Problem: PAIN - ADULT  Goal: Verbalizes/displays adequate comfort level or baseline comfort level  Description: Interventions:  - Encourage patient to monitor pain and request assistance  - Assess pain using appropriate pain scale  - Administer analgesics based on type and severity of pain and evaluate response  - Implement non-pharmacological measures as appropriate and evaluate response  - Consider cultural and social influences on pain and pain management  - Notify physician/advanced practitioner if interventions unsuccessful or patient reports new pain  Outcome: Progressing     Problem: INFECTION - ADULT  Goal: Absence or prevention of progression during hospitalization  Description: INTERVENTIONS:  - Assess and monitor for signs and symptoms of infection  - Monitor lab/diagnostic results  - Monitor all insertion sites, i.e. indwelling lines, tubes, and drains  - Monitor endotracheal if appropriate and nasal secretions for changes in amount and color  - [place] appropriate cooling/warming therapies per order  - Administer medications as ordered  - Instruct and encourage patient and family to use good hand hygiene  technique  - Identify and instruct in appropriate isolation precautions for identified infection/condition  Outcome: Progressing     Problem: SAFETY ADULT  Goal: Patient will remain free of falls  Description: INTERVENTIONS:  - Educate patient/family on patient safety including physical limitations  - Instruct patient to call for assistance with activity   - Consult OT/PT to assist with strengthening/mobility   - Keep Call bell within reach  - Keep bed low and locked with side rails adjusted as appropriate  - Keep care items and personal belongings within reach  - Initiate and maintain comfort rounds  - Make Fall Risk Sign visible to staff  - Offer Toileting every 2 Hours, in advance of need  - Initiate/Maintain bed alarm  - Obtain necessary fall risk management equipment: alarms  - Apply yellow socks and bracelet for high fall risk patients  - Consider moving patient to room near nurses station  Outcome: Progressing  Goal: Maintain or return to baseline ADL function  Description: INTERVENTIONS:  -  Assess patient's ability to carry out ADLs; assess patient's baseline for ADL function and identify physical deficits which impact ability to perform ADLs (bathing, care of mouth/teeth, toileting, grooming, dressing, etc.)  - Assess/evaluate cause of self-care deficits   - Assess range of motion  - Assess patient's mobility; develop plan if impaired  - Assess patient's need for assistive devices and provide as appropriate  - Encourage maximum independence but intervene and supervise when necessary  - Involve family in performance of ADLs  - Assess for home care needs following discharge   - Consider OT consult to assist with ADL evaluation and planning for discharge  - Provide patient education as appropriate  Outcome: Progressing  Goal: Maintains/Returns to pre admission functional level  Description: INTERVENTIONS:  - Perform AM-PAC 6 Click Basic Mobility/ Daily Activity assessment daily.  - Set and communicate daily  mobility goal to care team and patient/family/caregiver.   - Collaborate with rehabilitation services on mobility goals if consulted  - Ambulate patient 3 times a day  - Out of bed to chair 3 times a day   - Out of bed for meals 3 times a day  - Out of bed for toileting  - Record patient progress and toleration of activity level   Outcome: Progressing     Problem: DISCHARGE PLANNING  Goal: Discharge to home or other facility with appropriate resources  Description: INTERVENTIONS:  - Identify barriers to discharge w/patient and caregiver  - Arrange for needed discharge resources and transportation as appropriate  - Identify discharge learning needs (meds, wound care, etc.)  - Refer to Case Management Department for coordinating discharge planning if the patient needs post-hospital services based on physician/advanced practitioner order or complex needs related to functional status, cognitive ability, or social support system  Outcome: Progressing     Problem: Knowledge Deficit  Goal: Patient/family/caregiver demonstrates understanding of disease process, treatment plan, medications, and discharge instructions  Description: Complete learning assessment and assess knowledge base.  Interventions:  - Provide teaching at level of understanding  - Provide teaching via preferred learning methods  Outcome: Progressing     Problem: CARDIOVASCULAR - ADULT  Goal: Maintains optimal cardiac output and hemodynamic stability  Description: INTERVENTIONS:  - Monitor I/O, vital signs and rhythm  - Monitor for S/S and trends of decreased cardiac output  - Administer and titrate ordered vasoactive medications to optimize hemodynamic stability  - Assess quality of pulses, skin color and temperature  - Assess for signs of decreased coronary artery perfusion  - Instruct patient to report change in severity of symptoms  Outcome: Progressing  Goal: Absence of cardiac dysrhythmias or at baseline rhythm  Description: INTERVENTIONS:  -  Continuous cardiac monitoring, vital signs, obtain 12 lead EKG if ordered  - Administer antiarrhythmic and heart rate control medications as ordered  - Monitor electrolytes and administer replacement therapy as ordered  Outcome: Progressing

## 2024-11-27 NOTE — ANESTHESIA POSTPROCEDURE EVALUATION
Post-Op Assessment Note    CV Status:  Stable    Pain management: adequate       Mental Status:  Alert and awake   Hydration Status:  Euvolemic   PONV Controlled:  Controlled   Airway Patency:  Patent     Post Op Vitals Reviewed: Yes    No anethesia notable event occurred.    Staff: CRNA           Last Filed PACU Vitals:  Vitals Value Taken Time   Temp 98.1    Pulse 73    BP 94/61    Resp 12    SpO2 99        Modified Alexandru:  No data recorded

## 2024-11-27 NOTE — NURSING NOTE
Assumed care of this patient at 1500. Patient pulled out her IV. IV site replaced. Patient resting comfortably in bed. No complaints of pain, all needs met at this time. Call bell in reach, bed alarm on.     Parul OJEDAN, RN

## 2024-11-27 NOTE — OCCUPATIONAL THERAPY NOTE
Occupational Therapy         Patient Name: Duong Reece  Today's Date: 11/27/2024 11/27/24 1053   OT Last Visit   OT Visit Date 11/27/24   Note Type   Note type Cancelled Session   Cancel Reasons Patient off floor/test   Additional Comments Pt off floor in PACU following ROSA. Will continue to follow.     Erma Johnson

## 2024-11-28 VITALS
DIASTOLIC BLOOD PRESSURE: 96 MMHG | HEART RATE: 71 BPM | BODY MASS INDEX: 26.4 KG/M2 | WEIGHT: 149 LBS | HEIGHT: 63 IN | OXYGEN SATURATION: 95 % | TEMPERATURE: 97.8 F | RESPIRATION RATE: 18 BRPM | SYSTOLIC BLOOD PRESSURE: 138 MMHG

## 2024-11-28 PROBLEM — J98.4 ACUTE PULMONARY INSUFFICIENCY: Status: RESOLVED | Noted: 2024-11-25 | Resolved: 2024-11-28

## 2024-11-28 LAB
ANION GAP SERPL CALCULATED.3IONS-SCNC: 6 MMOL/L (ref 4–13)
BUN SERPL-MCNC: 15 MG/DL (ref 5–25)
CALCIUM SERPL-MCNC: 8.5 MG/DL (ref 8.4–10.2)
CHLORIDE SERPL-SCNC: 103 MMOL/L (ref 96–108)
CO2 SERPL-SCNC: 29 MMOL/L (ref 21–32)
CREAT SERPL-MCNC: 0.79 MG/DL (ref 0.6–1.3)
ERYTHROCYTE [DISTWIDTH] IN BLOOD BY AUTOMATED COUNT: 13.4 % (ref 11.6–15.1)
GFR SERPL CREATININE-BSD FRML MDRD: 67 ML/MIN/1.73SQ M
GLUCOSE SERPL-MCNC: 106 MG/DL (ref 65–140)
HCT VFR BLD AUTO: 43.2 % (ref 34.8–46.1)
HGB BLD-MCNC: 13.9 G/DL (ref 11.5–15.4)
MCH RBC QN AUTO: 28.9 PG (ref 26.8–34.3)
MCHC RBC AUTO-ENTMCNC: 32.2 G/DL (ref 31.4–37.4)
MCV RBC AUTO: 90 FL (ref 82–98)
PLATELET # BLD AUTO: 192 THOUSANDS/UL (ref 149–390)
PMV BLD AUTO: 11.4 FL (ref 8.9–12.7)
POTASSIUM SERPL-SCNC: 4.2 MMOL/L (ref 3.5–5.3)
RBC # BLD AUTO: 4.81 MILLION/UL (ref 3.81–5.12)
SODIUM SERPL-SCNC: 138 MMOL/L (ref 135–147)
WBC # BLD AUTO: 7.39 THOUSAND/UL (ref 4.31–10.16)

## 2024-11-28 PROCEDURE — 99239 HOSP IP/OBS DSCHRG MGMT >30: CPT | Performed by: PHYSICIAN ASSISTANT

## 2024-11-28 PROCEDURE — 80048 BASIC METABOLIC PNL TOTAL CA: CPT | Performed by: PHYSICIAN ASSISTANT

## 2024-11-28 PROCEDURE — 99232 SBSQ HOSP IP/OBS MODERATE 35: CPT

## 2024-11-28 PROCEDURE — 85027 COMPLETE CBC AUTOMATED: CPT | Performed by: PHYSICIAN ASSISTANT

## 2024-11-28 RX ORDER — LOSARTAN POTASSIUM 25 MG/1
25 TABLET ORAL DAILY
Qty: 30 TABLET | Refills: 0 | Status: SHIPPED | OUTPATIENT
Start: 2024-11-29 | End: 2024-12-02 | Stop reason: SDUPTHER

## 2024-11-28 RX ORDER — AMIODARONE HYDROCHLORIDE 200 MG/1
200 TABLET ORAL
Qty: 30 TABLET | Refills: 0 | Status: SHIPPED | OUTPATIENT
Start: 2024-12-04 | End: 2024-12-05 | Stop reason: SDUPTHER

## 2024-11-28 RX ORDER — METOPROLOL SUCCINATE 50 MG/1
50 TABLET, EXTENDED RELEASE ORAL EVERY 12 HOURS
Qty: 60 TABLET | Refills: 0 | Status: SHIPPED | OUTPATIENT
Start: 2024-11-28 | End: 2024-12-02 | Stop reason: SDUPTHER

## 2024-11-28 RX ORDER — FUROSEMIDE 20 MG/1
20 TABLET ORAL DAILY PRN
Qty: 60 TABLET | Refills: 0 | Status: SHIPPED | OUTPATIENT
Start: 2024-11-28 | End: 2024-12-02

## 2024-11-28 RX ORDER — AMIODARONE HYDROCHLORIDE 400 MG/1
400 TABLET ORAL 2 TIMES DAILY WITH MEALS
Qty: 11 TABLET | Refills: 0 | Status: SHIPPED | OUTPATIENT
Start: 2024-11-28 | End: 2024-12-05 | Stop reason: SDUPTHER

## 2024-11-28 RX ADMIN — METOPROLOL SUCCINATE 50 MG: 50 TABLET, EXTENDED RELEASE ORAL at 08:59

## 2024-11-28 RX ADMIN — APIXABAN 5 MG: 5 TABLET, FILM COATED ORAL at 08:59

## 2024-11-28 RX ADMIN — AMIODARONE HYDROCHLORIDE 400 MG: 200 TABLET ORAL at 08:59

## 2024-11-28 RX ADMIN — ATORVASTATIN CALCIUM 20 MG: 20 TABLET, FILM COATED ORAL at 08:59

## 2024-11-28 RX ADMIN — LOSARTAN POTASSIUM 25 MG: 25 TABLET, FILM COATED ORAL at 08:59

## 2024-11-28 RX ADMIN — DESVENLAFAXINE 50 MG: 50 TABLET, EXTENDED RELEASE ORAL at 09:00

## 2024-11-28 RX ADMIN — Medication 1 TABLET: at 08:58

## 2024-11-28 NOTE — NURSING NOTE
Patient discharged to home. IV removed. All belongings were taken. AVS reviewed with the patient and daughter. No further questions at this time.

## 2024-11-28 NOTE — PLAN OF CARE
Problem: Potential for Falls  Goal: Patient will remain free of falls  Description: INTERVENTIONS:  - Educate patient/family on patient safety including physical limitations  - Instruct patient to call for assistance with activity   - Consult OT/PT to assist with strengthening/mobility   - Keep Call bell within reach  - Keep bed low and locked with side rails adjusted as appropriate  - Keep care items and personal belongings within reach  - Initiate and maintain comfort rounds  - Make Fall Risk Sign visible to staff  - Offer Toileting every 2 Hours, in advance of need  - Initiate/Maintain bed alarm  - Obtain necessary fall risk management equipment: alarms  - Apply yellow socks and bracelet for high fall risk patients  - Consider moving patient to room near nurses station  Outcome: Progressing     Problem: PAIN - ADULT  Goal: Verbalizes/displays adequate comfort level or baseline comfort level  Description: Interventions:  - Encourage patient to monitor pain and request assistance  - Assess pain using appropriate pain scale  - Administer analgesics based on type and severity of pain and evaluate response  - Implement non-pharmacological measures as appropriate and evaluate response  - Consider cultural and social influences on pain and pain management  - Notify physician/advanced practitioner if interventions unsuccessful or patient reports new pain  Outcome: Progressing     Problem: INFECTION - ADULT  Goal: Absence or prevention of progression during hospitalization  Description: INTERVENTIONS:  - Assess and monitor for signs and symptoms of infection  - Monitor lab/diagnostic results  - Monitor all insertion sites, i.e. indwelling lines, tubes, and drains  - Monitor endotracheal if appropriate and nasal secretions for changes in amount and color  - [place] appropriate cooling/warming therapies per order  - Administer medications as ordered  - Instruct and encourage patient and family to use good hand hygiene  technique  - Identify and instruct in appropriate isolation precautions for identified infection/condition  Outcome: Progressing     Problem: SAFETY ADULT  Goal: Patient will remain free of falls  Description: INTERVENTIONS:  - Educate patient/family on patient safety including physical limitations  - Instruct patient to call for assistance with activity   - Consult OT/PT to assist with strengthening/mobility   - Keep Call bell within reach  - Keep bed low and locked with side rails adjusted as appropriate  - Keep care items and personal belongings within reach  - Initiate and maintain comfort rounds  - Make Fall Risk Sign visible to staff  - Offer Toileting every 2 Hours, in advance of need  - Initiate/Maintain bed alarm  - Obtain necessary fall risk management equipment: alarms  - Apply yellow socks and bracelet for high fall risk patients  - Consider moving patient to room near nurses station  Outcome: Progressing  Goal: Maintain or return to baseline ADL function  Description: INTERVENTIONS:  -  Assess patient's ability to carry out ADLs; assess patient's baseline for ADL function and identify physical deficits which impact ability to perform ADLs (bathing, care of mouth/teeth, toileting, grooming, dressing, etc.)  - Assess/evaluate cause of self-care deficits   - Assess range of motion  - Assess patient's mobility; develop plan if impaired  - Assess patient's need for assistive devices and provide as appropriate  - Encourage maximum independence but intervene and supervise when necessary  - Involve family in performance of ADLs  - Assess for home care needs following discharge   - Consider OT consult to assist with ADL evaluation and planning for discharge  - Provide patient education as appropriate  Outcome: Progressing  Goal: Maintains/Returns to pre admission functional level  Description: INTERVENTIONS:  - Perform AM-PAC 6 Click Basic Mobility/ Daily Activity assessment daily.  - Set and communicate daily  mobility goal to care team and patient/family/caregiver.   - Collaborate with rehabilitation services on mobility goals if consulted  - Ambulate patient 3 times a day  - Out of bed to chair 3 times a day   - Out of bed for meals 3 times a day  - Out of bed for toileting  - Record patient progress and toleration of activity level   Outcome: Progressing     Problem: DISCHARGE PLANNING  Goal: Discharge to home or other facility with appropriate resources  Description: INTERVENTIONS:  - Identify barriers to discharge w/patient and caregiver  - Arrange for needed discharge resources and transportation as appropriate  - Identify discharge learning needs (meds, wound care, etc.)  - Refer to Case Management Department for coordinating discharge planning if the patient needs post-hospital services based on physician/advanced practitioner order or complex needs related to functional status, cognitive ability, or social support system  Outcome: Progressing     Problem: Knowledge Deficit  Goal: Patient/family/caregiver demonstrates understanding of disease process, treatment plan, medications, and discharge instructions  Description: Complete learning assessment and assess knowledge base.  Interventions:  - Provide teaching at level of understanding  - Provide teaching via preferred learning methods  Outcome: Progressing     Problem: CARDIOVASCULAR - ADULT  Goal: Maintains optimal cardiac output and hemodynamic stability  Description: INTERVENTIONS:  - Monitor I/O, vital signs and rhythm  - Monitor for S/S and trends of decreased cardiac output  - Administer and titrate ordered vasoactive medications to optimize hemodynamic stability  - Assess quality of pulses, skin color and temperature  - Assess for signs of decreased coronary artery perfusion  - Instruct patient to report change in severity of symptoms  Outcome: Progressing  Goal: Absence of cardiac dysrhythmias or at baseline rhythm  Description: INTERVENTIONS:  -  Continuous cardiac monitoring, vital signs, obtain 12 lead EKG if ordered  - Administer antiarrhythmic and heart rate control medications as ordered  - Monitor electrolytes and administer replacement therapy as ordered  Outcome: Progressing

## 2024-11-28 NOTE — DISCHARGE SUMMARY
Discharge Summary - Hospitalist   Name: Duong Reece 89 y.o. female I MRN: [id number]  Unit/Bed#: E4 -01 I Date of Admission: 11/25/2024   Date of Service: 11/28/2024 I Hospital Day: 3     Assessment & Plan  Rapid atrial fibrillation (HCC)  History of anxiety/depression hyperlipidemia glaucoma with recent findings of atrial fibrillation presents to the hospital with worsening shortness of breath  Her PCP Dr. Oswald retired and she recently establish care with [facility]  During first visit was noted to have atrial fibrillation and started on apixaban.  Patient stopped taking after several weeks due to shortness of breath as she thought this was the cause  Cardiology following  Status post cardioversion successfully - back in Afib the following day  Continue metoprolol, apixaban.  Also started on amiodarone, 400 mg twice daily x 7 days, followed by 200 mg daily  Depression with anxiety  Continue alprazolam.  Confirmed on   Glaucoma  Continue eyedrops  Mixed hyperlipidemia  Continue atorvastatin  Acute congestive heart failure (HCC)  Wt Readings from Last 3 Encounters:   11/27/24 67.6 kg (149 lb)   02/26/24 64.8 kg (142 lb 12.8 oz)   02/24/23 64.8 kg (142 lb 12.8 oz)     Secondary to A-fib RVR  Echo with EF 20% and severe dilation, as well as moderate MR, moderate TR  CXR with small effusions  History of hysterectomy  Noted     Medical Problems       Resolved Problems  Date Reviewed: 2/26/2024          Resolved    Acute pulmonary insufficiency 11/28/2024     Resolved by  Rekha Mariano PA-C        Discharging Physician / Practitioner: Brad Blanco PA-C  PCP: Duong Oliveros  Admission Date:   Admission Orders (From admission, onward)       Ordered        11/25/24 8622  INPATIENT ADMISSION  Once                          Discharge Date: 11/28/24    Consultations During Hospital Stay:  Cardiology    Procedures Performed:   Telemetry  ROSA  Cardioversion    Significant Findings / Test Results:   XR  chest 1 view portable  Result Date: 11/25/2024  Impression: Bibasilar pulmonary densities with appearance favoring atelectasis although pneumonia should be considered in the appropriate clinical scenario. Small bilateral right greater than left pleural effusions are noted as well. The study was marked in EPIC for immediate notification. Workstation performed: [id number]     Afib RVR on admission  Back in Afib s/p cardioversion    Incidental Findings:   None    Test Results Pending at Discharge (will require follow up):   None     Outpatient Tests Requested:  None    Complications: None    Reason for Admission: [facility]    Hospital Course:   Duong Reece is a 89 y.o. female patient PMH anxiety/depression, HTN, recently diagnosed with A-fib who originally presented to the hospital on 11/25/2024 due to shortness of breath.  PCP retired and patient establish care with [facility].  At first visit, found to be in A-fib and started on Eliquis.  Patient stopped taking Eliquis as she began to feel short of breath shortly after starting it and associated with Eliquis.  Symptoms progressed the patient presented to the ED in A-fib RVR with  and evidence of fluid overload.  Underwent echocardiogram which demonstrated an EF of 20% with severe global hypokinesis.  Underwent cardioversion which initially was successful, however the following day she returned in A-fib.  She was initiated on amiodarone and is to continue 400 mg twice daily x 6 days, followed by 200 mg daily.  Additionally, she was started on metoprolol and losartan for HFrEF GDMT.  Continue Eliquis for stroke prophylaxis.  Outpatient follow-up with cardiology and PCP.    Please see above list of diagnoses and related plan for additional information.     Condition at Discharge: stable    Discharge Day Visit / Exam:   * Please refer to separate progress note for these details *    Discussion with Family: Updated  (son and daughter) at  bedside.    Discharge instructions/Information to patient and family:   See after visit summary for information provided to patient and family.      Provisions for Follow-Up Care:  See after visit summary for information related to follow-up care and any pertinent home health orders.      Mobility at time of Discharge:   Basic Mobility Inpatient Raw Score: 20  JH-HLM Goal: 6: Walk 10 steps or more  JH-HLM Achieved: 6: Walk 10 steps or more  HLM Goal achieved. Continue to encourage appropriate mobility.     Disposition:   Home    Planned Readmission: None    Discharge Medications:  See after visit summary for reconciled discharge medications provided to patient and/or family.      Administrative Statements   Discharge Statement:  I have spent a total time of 45 minutes in caring for this patient on the day of the visit/encounter. .    **Please Note: This note may have been constructed using a voice recognition system**

## 2024-11-28 NOTE — ASSESSMENT & PLAN NOTE
History of anxiety/depression hyperlipidemia glaucoma with recent findings of atrial fibrillation presents to the hospital with worsening shortness of breath  Her PCP Dr. Oswald retired and she recently establish care with [facility]  During first visit was noted to have atrial fibrillation and started on apixaban.  Patient stopped taking after several weeks due to shortness of breath as she thought this was the cause  Cardiology following  Status post cardioversion successfully - back in Afib the following day  Continue metoprolol, apixaban.  Also started on amiodarone, 400 mg twice daily x 7 days, followed by 200 mg daily

## 2024-11-28 NOTE — PROGRESS NOTES
Progress Note - Cardiology   Name: Duong Reece 89 y.o. female I MRN: [id number]  Unit/Bed#: E4 -01 I Date of Admission: 11/25/2024   Date of Service: 11/28/2024 I Hospital Day: 3  Assessment & Plan  Acute congestive heart failure (HCC)  HFrEF, LVEF 20%, LVIDd 5.4 cm, NYHA Class I, AHA Stage C, etiology is likely tachyarrhythmia  Neurohormonal Blockade:  --Beta Blocker: Toprol 50 mg BID  --ARNi / ACEi / ARB: Losartan 25 mg QD  --Aldosterone Antagonist: None  --SGLT2 Inhibitor: None  --Home Diuretic: Lasix 20 mg PRN for weight gain > 3lbs overnight or 5 lbs in 1 week.    Euvolemic today at 149#.  Daily weights at home  Low salt diet  BMP ordered for 1 week from now  Will need repeat echo as OP at end of Feb - March  Rapid atrial fibrillation (HCC)  S/p ROSA/CV 11/27  Now back in A-fib with controlled ventricular response, asxs  Continue medications including Toprol 50 mg BID & amiodarone  Stroke prevention with Eliquis 5 mg twice daily  I will have the cardiology office clerical staff call her to schedule an office visit and we can see if she needs reattempted CV as OP.    Subjective   Pt reports dry mouth. Denies bautista with being oob & showering today. Pt reports mild peripheral edema. Pt denies palpitations, lightheadedness, nausea or anxiety today. Pt denies orthopnea.    Objective :  Temp:  [97.3 °F (36.3 °C)-98.6 °F (37 °C)] 97.8 °F (36.6 °C)  HR:  [59-73] 71  BP: (104-138)/(58-96) 138/96  Resp:  [16-19] 18  SpO2:  [94 %-98 %] 95 %  O2 Device: None (Room air)  Nasal Cannula O2 Flow Rate (L/min):  [2 L/min] 2 L/min    Vitals:    11/27/24 0700 11/27/24 0946   Weight: 67.8 kg (149 lb 7.6 oz) 67.6 kg (149 lb)     Physical Exam  Vitals and nursing note reviewed.   Constitutional:       General: She is not in acute distress.  HENT:      Head: Normocephalic and atraumatic.      Nose: No congestion.      Mouth/Throat:      Mouth: Mucous membranes are moist.   Neck:      Comments: - JVD or HJR  Cardiovascular:      Rate  and Rhythm: Normal rate. Rhythm irregularly irregular.      Heart sounds: S1 normal and S2 normal. No murmur heard.  Pulmonary:      Effort: Pulmonary effort is normal.      Breath sounds: No wheezing, rhonchi or rales.      Comments: Decreased breath sounds bl bases no coughing or dyspnea noted with speaking no hypoxia  Abdominal:      Palpations: Abdomen is soft.   Musculoskeletal:         General: Normal range of motion.      Comments: Trace NON PITTING edema bl ankles    Left arm multiple bruises right arm area of subcutaneous firmness just proximal from antecubital space from old infiltrated iv mild tender to palp no hematoma    No excessive warmth. No coolness in the distal extremity   Neurological:      Mental Status: She is alert.   Psychiatric:      Comments: Mild cognitive changes in the older adult     Lab Results: I have reviewed the following results:  CBC Today reviewed  Telemetry -  afib with average hr 82 bpm    Rekha Mariano PA-C

## 2024-11-28 NOTE — DISCHARGE INSTR - AVS FIRST PAGE
"Use warm compress on the arms in areas of bruising or swelling from the failed IV sites.    Please weigh yourself every day at home. If there is 3 lb weight gain in 1 day or 5 lb weight gain in 1 week, please take 1 water pill. Use the water pill (Lasix ) \"as needed\" for signs of fluid overload like increased leg swelling or shortness of breath with cough.    You can call our office if you have questions or reservations about using the \"as needed\" water pill.    Blood test ordered for about 1 week from now to monitor the kidney function given the new start of medications in the hospital. No need to fast for the blood test. It is already ordered in the computer system you can just show up to the lab any time of day to have it drawn (approx 1 week).    Cardiology  will call you (or daughter) to arrange a time for you to come to the office for a check up.    Medication list    In the morning:  - Amiodarone 400mg x 6 days, then 200mg everyday  - Eliquis 2.5mg   - Toprol-XL 50mg  - Pritsiq 50mg  - Multivitamin    At night:  - Amiodarone 400mg x 6 days, then no more amiodarone at night (only in the morning)  - Eliquis 2.5mg   - Toprol-XL 50mg  - Losartan 25mg  - Lipitor 20mg    As needed:  - Xanax 0.5mg   "

## 2024-11-28 NOTE — PLAN OF CARE
Problem: Potential for Falls  Goal: Patient will remain free of falls  Description: INTERVENTIONS:  - Educate patient/family on patient safety including physical limitations  - Instruct patient to call for assistance with activity   - Consult OT/PT to assist with strengthening/mobility   - Keep Call bell within reach  - Keep bed low and locked with side rails adjusted as appropriate  - Keep care items and personal belongings within reach  - Initiate and maintain comfort rounds  - Make Fall Risk Sign visible to staff  - Offer Toileting every 2 Hours, in advance of need  - Initiate/Maintain bed alarm  - Obtain necessary fall risk management equipment: alarms  - Apply yellow socks and bracelet for high fall risk patients  - Consider moving patient to room near nurses station  Outcome: Progressing     Problem: PAIN - ADULT  Goal: Verbalizes/displays adequate comfort level or baseline comfort level  Description: Interventions:  - Encourage patient to monitor pain and request assistance  - Assess pain using appropriate pain scale  - Administer analgesics based on type and severity of pain and evaluate response  - Implement non-pharmacological measures as appropriate and evaluate response  - Consider cultural and social influences on pain and pain management  - Notify physician/advanced practitioner if interventions unsuccessful or patient reports new pain  Outcome: Progressing     Problem: INFECTION - ADULT  Goal: Absence or prevention of progression during hospitalization  Description: INTERVENTIONS:  - Assess and monitor for signs and symptoms of infection  - Monitor lab/diagnostic results  - Monitor all insertion sites, i.e. indwelling lines, tubes, and drains  - Monitor endotracheal if appropriate and nasal secretions for changes in amount and color  - [place] appropriate cooling/warming therapies per order  - Administer medications as ordered  - Instruct and encourage patient and family to use good hand hygiene  technique  - Identify and instruct in appropriate isolation precautions for identified infection/condition  Outcome: Progressing     Problem: SAFETY ADULT  Goal: Patient will remain free of falls  Description: INTERVENTIONS:  - Educate patient/family on patient safety including physical limitations  - Instruct patient to call for assistance with activity   - Consult OT/PT to assist with strengthening/mobility   - Keep Call bell within reach  - Keep bed low and locked with side rails adjusted as appropriate  - Keep care items and personal belongings within reach  - Initiate and maintain comfort rounds  - Make Fall Risk Sign visible to staff  - Offer Toileting every 2 Hours, in advance of need  - Initiate/Maintain bed alarm  - Obtain necessary fall risk management equipment: alarms  - Apply yellow socks and bracelet for high fall risk patients  - Consider moving patient to room near nurses station  Outcome: Progressing  Goal: Maintain or return to baseline ADL function  Description: INTERVENTIONS:  -  Assess patient's ability to carry out ADLs; assess patient's baseline for ADL function and identify physical deficits which impact ability to perform ADLs (bathing, care of mouth/teeth, toileting, grooming, dressing, etc.)  - Assess/evaluate cause of self-care deficits   - Assess range of motion  - Assess patient's mobility; develop plan if impaired  - Assess patient's need for assistive devices and provide as appropriate  - Encourage maximum independence but intervene and supervise when necessary  - Involve family in performance of ADLs  - Assess for home care needs following discharge   - Consider OT consult to assist with ADL evaluation and planning for discharge  - Provide patient education as appropriate  Outcome: Progressing  Goal: Maintains/Returns to pre admission functional level  Description: INTERVENTIONS:  - Perform AM-PAC 6 Click Basic Mobility/ Daily Activity assessment daily.  - Set and communicate daily  mobility goal to care team and patient/family/caregiver.   - Collaborate with rehabilitation services on mobility goals if consulted  - Ambulate patient 3 times a day  - Out of bed to chair 3 times a day   - Out of bed for meals 3 times a day  - Out of bed for toileting  - Record patient progress and toleration of activity level   Outcome: Progressing     Problem: DISCHARGE PLANNING  Goal: Discharge to home or other facility with appropriate resources  Description: INTERVENTIONS:  - Identify barriers to discharge w/patient and caregiver  - Arrange for needed discharge resources and transportation as appropriate  - Identify discharge learning needs (meds, wound care, etc.)  - Refer to Case Management Department for coordinating discharge planning if the patient needs post-hospital services based on physician/advanced practitioner order or complex needs related to functional status, cognitive ability, or social support system  Outcome: Progressing     Problem: Knowledge Deficit  Goal: Patient/family/caregiver demonstrates understanding of disease process, treatment plan, medications, and discharge instructions  Description: Complete learning assessment and assess knowledge base.  Interventions:  - Provide teaching at level of understanding  - Provide teaching via preferred learning methods  Outcome: Progressing     Problem: CARDIOVASCULAR - ADULT  Goal: Maintains optimal cardiac output and hemodynamic stability  Description: INTERVENTIONS:  - Monitor I/O, vital signs and rhythm  - Monitor for S/S and trends of decreased cardiac output  - Administer and titrate ordered vasoactive medications to optimize hemodynamic stability  - Assess quality of pulses, skin color and temperature  - Assess for signs of decreased coronary artery perfusion  - Instruct patient to report change in severity of symptoms  Outcome: Progressing  Goal: Absence of cardiac dysrhythmias or at baseline rhythm  Description: INTERVENTIONS:  -  Continuous cardiac monitoring, vital signs, obtain 12 lead EKG if ordered  - Administer antiarrhythmic and heart rate control medications as ordered  - Monitor electrolytes and administer replacement therapy as ordered  Outcome: Progressing

## 2024-11-28 NOTE — ASSESSMENT & PLAN NOTE
S/p ROSA/CV 11/27  Now back in A-fib with controlled ventricular response, asxs  Continue medications including Toprol 50 mg BID & amiodarone  Stroke prevention with Eliquis 5 mg twice daily  I will have the cardiology office clerical staff call her to schedule an office visit and we can see if she needs reattempted CV as OP.

## 2024-11-28 NOTE — ASSESSMENT & PLAN NOTE
HFrEF, LVEF 20%, LVIDd 5.4 cm, NYHA Class I, AHA Stage C, etiology is likely tachyarrhythmia  Neurohormonal Blockade:  --Beta Blocker: Toprol 50 mg BID  --ARNi / ACEi / ARB: Losartan 25 mg QD  --Aldosterone Antagonist: None  --SGLT2 Inhibitor: None  --Home Diuretic: Lasix 20 mg PRN for weight gain > 3lbs overnight or 5 lbs in 1 week.    Euvolemic today at 149#.  Daily weights at home  Low salt diet  BMP ordered for 1 week from now  Will need repeat echo as OP at end of Feb - March

## 2024-11-29 NOTE — PROGRESS NOTES
Cardiology Follow Up    [facility]  [address] [place] [address]  PHONE: [phone]  FAX: [phone]    Duong Reece  1935  [id number]    Assessment/Plan:  Acute congestive heart failure (HCC)  Dilated cardiomyopathy, HFrEF, LVEF 20%, LVIDd 5.4 cm, NYHA Class I, AHA Stage C, etiology is likely tachyarrhythmia  Neurohormonal Blockade:  --Beta Blocker: Toprol 50 mg BID  --ARNi / ACEi / ARB: Losartan 25 mg QD  --Aldosterone Antagonist: None  --SGLT2 Inhibitor: None     She has HANCOCK with rales nubia LLL. Weights have been stable at home even lost a few lbs however I believe she must have some pulmonary edema.  Start Lasix with 40 mg today, then tomorrow 20 mg daily.   I will ask the clinical team to call her to check in on Wednesday morning.  BMP ordered for later this week.    Rapid atrial fibrillation (HCC)  Severely dilated Left atrium on echo, 11/26/24  S/p ROSA/CV 11/27/24  Still in A-fib with controlled ventricular response, HR 93 bpm  Continue medications including Toprol 50 mg BID & amiodarone  Goes to 200 mg QD dosing of amiodarone on 12/4  Stroke prevention with Eliquis 2.5 mg twice daily  RN visit in 1 month for EKG. If she is still in A-fib, consider repeat CV as OP vs rate control alone.    RTO with me on Dec 17th for CHF follow up. Nurse visit for EKG in early January. Another appt with me, Dr. Campbell or Dr. Smith in early Feb.    Interval History:   Duong Reece is a 89 y.o. female with past medical history as below who presents to the office for follow-up after hospitalization at [facility] from 11/25-11/28/24 for SOB. Found to have new onset A-fib with RVR And de vinay HFrEF.  Attempted rate control, underwent unsuccessful cardioversion and eventually was discharged to home in rate controlled A-fib.    Since her dc to home pt reports she has been doing okay until last night she had a rough night, restless, pleuritic chest pain on the left  side, this morning generalized weakness, tiredness and dyspnea with exertion.  Patient denies coughing.  Patient has limited appetite.  This is chronic.    During our encounter today patient feels overwhelmed, fluttering in her stomach and slightly nauseous or lightheaded but she thinks this is her nerves and daughter agrees.  Patient denies recent viral prodrome like myalgia, fever, sore throat, rhinorrhea or diarrhea.    Review of Systems   Constitutional: Positive for malaise/fatigue. Negative for diaphoresis and weight gain.   Cardiovascular:  Positive for chest pain, dyspnea on exertion and palpitations. Negative for irregular heartbeat and leg swelling.   Respiratory:  Positive for shortness of breath and sleep disturbances due to breathing. Negative for cough.    Gastrointestinal:  Positive for nausea.   Genitourinary:         - nocturia   Neurological:  Positive for light-headedness. Negative for dizziness.     Past Medical History:   Diagnosis Date    Anxiety     Atrial fibrillation (HCC)     Depression     Glaucoma     Hyperlipemia       Past Surgical History:   Procedure Laterality Date    APPENDECTOMY      HYSTERECTOMY        Family History   Problem Relation Age of Onset    Hypertension Mother     Depression Mother     Stroke Father     Alcohol abuse Son     Substance Abuse Son       Current Outpatient Medications:     ALPRAZolam (XANAX) 0.5 mg tablet, TAKE 1 TABLET BY MOUTH EVERY 4 TO 6 HOURS AS NEEDED, Disp: 50 tablet, Rfl: 1    amiodarone (PACERONE) 400 MG tablet, Take 1 tablet (400 mg total) by mouth 2 (two) times a day with meals for 11 doses, Disp: 11 tablet, Rfl: 0    [START ON 12/4/2024] amiodarone 200 mg tablet, Take 1 tablet (200 mg total) by mouth daily with breakfast Do not start before December 4, 2024., Disp: 30 tablet, Rfl: 0    atorvastatin (LIPITOR) 20 mg tablet, TAKE 1 TABLET BY MOUTH EVERY DAY, Disp: 90 tablet, Rfl: 3    desvenlafaxine succinate (PRISTIQ) 50 mg 24 hr tablet, Take 1  "tablet (50 mg total) by mouth daily, Disp: 90 tablet, Rfl: 1    Eliquis 2.5 MG, Take 1 tablet (2.5 mg total) by mouth 2 (two) times a day, Disp: 60 tablet, Rfl: 6    furosemide (LASIX) 20 mg tablet, Take 1 tablet (20 mg total) by mouth daily, Disp: 60 tablet, Rfl: 0    latanoprost (XALATAN) 0.005 % ophthalmic solution, INSTILL 1 DROP INTO BOTH EYES AT BEDTIME, Disp: , Rfl:     losartan (COZAAR) 25 mg tablet, Take 1 tablet (25 mg total) by mouth daily, Disp: 30 tablet, Rfl: 6    metoprolol succinate (TOPROL-XL) 50 mg 24 hr tablet, Take 1 tablet (50 mg total) by mouth every 12 (twelve) hours, Disp: 60 tablet, Rfl: 6    MULTIPLE VITAMINS-CALCIUM PO, Take 1 tablet by mouth daily, Disp: , Rfl:     TRAVATAN Z 0.004 % ophthalmic solution, Administer 1 drop to both eyes daily at bedtime, Disp: , Rfl: 5     No Known Allergies    Physical Exam:  Vitals:    12/02/24 1251   BP: 144/90   Pulse: 93     Vitals:    12/02/24 1251   Weight: 65.5 kg (144 lb 6.4 oz)     Height: 5' 3\" (160 cm)   Body mass index is 25.58 kg/m².    Physical Exam  Vitals and nursing note reviewed.   Constitutional:       General: She is not in acute distress.     Appearance: She is ill-appearing.   HENT:      Head: Normocephalic and atraumatic.      Nose: No congestion or rhinorrhea.      Mouth/Throat:      Mouth: Mucous membranes are moist.   Eyes:      Conjunctiva/sclera: Conjunctivae normal.   Neck:      Comments: - JVD or HJR  Cardiovascular:      Rate and Rhythm: Normal rate. Rhythm irregular.      Heart sounds: S1 normal and S2 normal.   Pulmonary:      Comments: Rales L >R    Mild dyspnea with speaking no hypoxia  Abdominal:      Palpations: Abdomen is soft.      Tenderness: There is no abdominal tenderness.   Musculoskeletal:         General: No swelling. Normal range of motion.   Skin:     General: Skin is warm and dry.   Neurological:      General: No focal deficit present.      Mental Status: She is alert and oriented to person, place, and time. "   Psychiatric:      Comments: anxious     ECG: A-fib with heart rate 93 bpm.  PVCs.  Indeterminate axis.  No infarct or ischemia.    Rekha Mariano PA-C  [facility]

## 2024-11-29 NOTE — UTILIZATION REVIEW
NOTIFICATION OF ADMISSION DISCHARGE   This is a Notification of Discharge from [facility]. Please be advised that this patient has been discharge from our facility. Below you will find the admission and discharge date and time including the patient’s disposition.   UTILIZATION REVIEW CONTACT:  Della Caruso  Utilization   Network Utilization Review Department  Phone: [phone] x carefully listen to the prompts. All voicemails are confidential.  Email: [email or web address]     ADMISSION INFORMATION  PRESENTATION DATE: 11/25/2024  3:44 PM  OBERVATION ADMISSION DATE: N/A  INPATIENT ADMISSION DATE: 11/25/24  5:32 PM   DISCHARGE DATE: 11/28/2024 12:25 PM   DISPOSITION:Home/Self Care    Network Utilization Review Department  ATTENTION: Please call with any questions or concerns to [phone] and carefully listen to the prompts so that you are directed to the right person. All voicemails are confidential.   For Discharge needs, contact Care Management DC Support Team at [phone] opt. 2  Send all requests for admission clinical reviews, approved or denied determinations and any other requests to dedicated fax number below belonging to the campus where the patient is receiving treatment. List of dedicated fax numbers for the Facilities:  FACILITY NAME UR FAX NUMBER   ADMISSION DENIALS (Administrative/Medical Necessity) [phone]   DISCHARGE SUPPORT TEAM ([facility]) [phone]   PARENT CHILD HEALTH (Maternity/NICU/Pediatrics) [phone]   [facility] [phone]   [facility] [phone]   [facility] [phone]   [facility] [phone]   Count includes the [facility] [phone]   [facility] [phone]   [facility] [phone]   [facility] [phone]    [facility] [phone]   [facility] [phone]   [facility] [phone]   [facility] [phone]

## 2024-11-29 NOTE — RESTORATIVE TECHNICIAN NOTE
Restorative Technician Note      Patient Name: Duong Reece     Restorative Tech Visit Date: 11/29/24  Note Type: Mobility  Patient Position Upon Consult: Supine  Activity Performed: Ambulated; Range of motion  Patient Position at End of Consult: All needs within reach; Bedside chair

## 2024-12-02 ENCOUNTER — OFFICE VISIT (OUTPATIENT)
Dept: CARDIOLOGY CLINIC | Facility: CLINIC | Age: 89
End: 2024-12-02
Payer: COMMERCIAL

## 2024-12-02 VITALS
HEART RATE: 93 BPM | WEIGHT: 144.4 LBS | BODY MASS INDEX: 25.59 KG/M2 | SYSTOLIC BLOOD PRESSURE: 144 MMHG | DIASTOLIC BLOOD PRESSURE: 90 MMHG | HEIGHT: 63 IN

## 2024-12-02 DIAGNOSIS — I50.21 ACUTE SYSTOLIC CONGESTIVE HEART FAILURE (HCC): ICD-10-CM

## 2024-12-02 DIAGNOSIS — I48.91 RAPID ATRIAL FIBRILLATION (HCC): Primary | ICD-10-CM

## 2024-12-02 DIAGNOSIS — I48.91 ATRIAL FIBRILLATION WITH RAPID VENTRICULAR RESPONSE (HCC): ICD-10-CM

## 2024-12-02 PROCEDURE — 93000 ELECTROCARDIOGRAM COMPLETE: CPT | Performed by: PHYSICIAN ASSISTANT

## 2024-12-02 PROCEDURE — 99214 OFFICE O/P EST MOD 30 MIN: CPT | Performed by: PHYSICIAN ASSISTANT

## 2024-12-02 RX ORDER — FUROSEMIDE 20 MG/1
20 TABLET ORAL DAILY
Qty: 60 TABLET | Refills: 0 | Status: SHIPPED | OUTPATIENT
Start: 2024-12-02

## 2024-12-02 RX ORDER — LOSARTAN POTASSIUM 25 MG/1
25 TABLET ORAL DAILY
Qty: 30 TABLET | Refills: 6 | Status: SHIPPED | OUTPATIENT
Start: 2024-12-02

## 2024-12-02 RX ORDER — METOPROLOL SUCCINATE 50 MG/1
50 TABLET, EXTENDED RELEASE ORAL EVERY 12 HOURS
Qty: 60 TABLET | Refills: 6 | Status: SHIPPED | OUTPATIENT
Start: 2024-12-02

## 2024-12-02 RX ORDER — APIXABAN 2.5 MG/1
2.5 TABLET, FILM COATED ORAL 2 TIMES DAILY
Qty: 60 TABLET | Refills: 6 | Status: SHIPPED | OUTPATIENT
Start: 2024-12-02

## 2024-12-02 NOTE — PATIENT INSTRUCTIONS
Take Lasix 40 mg today.    Then starting tomorrow, Tuesday Dec 3rd, please take Lasix 20 mg daily. Continue with standing on the scale every day.    Continue the other medications as prescribed.

## 2024-12-03 ENCOUNTER — TELEPHONE (OUTPATIENT)
Dept: CARDIOLOGY CLINIC | Facility: CLINIC | Age: 89
End: 2024-12-03

## 2024-12-05 ENCOUNTER — TELEPHONE (OUTPATIENT)
Dept: CARDIOLOGY CLINIC | Facility: CLINIC | Age: 89
End: 2024-12-05

## 2024-12-05 DIAGNOSIS — I48.91 ATRIAL FIBRILLATION WITH RAPID VENTRICULAR RESPONSE (HCC): ICD-10-CM

## 2024-12-05 RX ORDER — AMIODARONE HYDROCHLORIDE 200 MG/1
200 TABLET ORAL
Qty: 30 TABLET | Refills: 5 | Status: SHIPPED | OUTPATIENT
Start: 2024-12-05

## 2024-12-05 RX ORDER — AMIODARONE HYDROCHLORIDE 400 MG/1
400 TABLET ORAL 2 TIMES DAILY WITH MEALS
Qty: 11 TABLET | Refills: 0 | Status: CANCELLED | OUTPATIENT
Start: 2024-12-05 | End: 2024-12-11

## 2024-12-05 NOTE — TELEPHONE ENCOUNTER
----- Message from Rekha Mariano PA-C sent at 12/2/2024  2:16 PM EST -----  Regarding: chf phone call check in  Hi I started her on diuretic on my office visit on 12/2 12/5/24 spoke with patient  Can you please call Duong herself on Wednesday 12/4 to see how she is feeling? States this is the first day she is feeling really good    What is her weight states it has been staying around 140-141 lbs, today was 141 lbs    Has she been taking lasix? Took 40 mg on Monday and has been taking 20 mg daily since  States her urine output was a lot, but has since decreased.     What has she been eating and drinking? She does not drink that much, states she has coffee and diet coke (the bottle has lasted all week). She said she eats TV dinners, but ordered for the first time from [employer], will be coming soon, not sure if she is going to like it. She doesn't remember what she ordered.  I did educate on salt intake and that TV dinners, canned soups etc, have a lot of salt, and that can lead to retaining fluid. Advised to try to eat more fresh fruits and vegetables.  She states she does not have an issue with retaining fluid and that years ago her BP was low in the past and her PCP advised her to eat as much salt as she wants.     How is her breathing? Has she been sleeping alright? Has she been walking around the apartment ok? States her breathing is the best today, able to ambulate around her apartment with out getting SOB. Is able to sleep with 1 or 2 pillows (states her pillows are flat)    Reminded patient of appointment 12/17/24 with Rekha to call with any questions or concerns prior to visit. Patient states she has office phone #.     Thanks    Rekha

## 2024-12-05 NOTE — TELEPHONE ENCOUNTER
Medication: amiodarone 200 mg tablet     Dose/Frequency: Take 1 tablet (200 mg total) by mouth daily with breakfast Do not start before December 4, 2024     Quantity: 30    Pharmacy: [id number] [place] - [address]     Office:   [] PCP/Provider -   [x] Speciality/Provider - Rekha Mariano    Does the patient have enough for 3 days?   [] Yes   [x] No - Send as HP to POD

## 2024-12-10 ENCOUNTER — TELEPHONE (OUTPATIENT)
Dept: CARDIOLOGY CLINIC | Facility: CLINIC | Age: 89
End: 2024-12-10

## 2024-12-10 NOTE — TELEPHONE ENCOUNTER
PC to daughter Nelly reminding her to take Duong for non fasting lab work ordered by Rekha.  Gave mobile lab number but daughter thinks she can take her out Friday.

## 2024-12-12 ENCOUNTER — APPOINTMENT (OUTPATIENT)
Dept: LAB | Facility: CLINIC | Age: 89
End: 2024-12-12
Payer: COMMERCIAL

## 2024-12-12 DIAGNOSIS — I50.9 ACUTE CONGESTIVE HEART FAILURE (HCC): ICD-10-CM

## 2024-12-12 DIAGNOSIS — I48.91 ATRIAL FIBRILLATION WITH RAPID VENTRICULAR RESPONSE (HCC): ICD-10-CM

## 2024-12-12 LAB
ANION GAP SERPL CALCULATED.3IONS-SCNC: 9 MMOL/L (ref 4–13)
BUN SERPL-MCNC: 11 MG/DL (ref 5–25)
CALCIUM SERPL-MCNC: 9.1 MG/DL (ref 8.4–10.2)
CHLORIDE SERPL-SCNC: 102 MMOL/L (ref 96–108)
CO2 SERPL-SCNC: 32 MMOL/L (ref 21–32)
CREAT SERPL-MCNC: 0.94 MG/DL (ref 0.6–1.3)
GFR SERPL CREATININE-BSD FRML MDRD: 54 ML/MIN/1.73SQ M
GLUCOSE P FAST SERPL-MCNC: 112 MG/DL (ref 65–99)
POTASSIUM SERPL-SCNC: 3.9 MMOL/L (ref 3.5–5.3)
SODIUM SERPL-SCNC: 143 MMOL/L (ref 135–147)

## 2024-12-12 PROCEDURE — 36415 COLL VENOUS BLD VENIPUNCTURE: CPT

## 2024-12-12 PROCEDURE — 80048 BASIC METABOLIC PNL TOTAL CA: CPT

## 2024-12-16 NOTE — PROGRESS NOTES
Cardiology Follow Up    [facility] [address] [address]  PHONE: [phone]  FAX: [phone]    Duong Reece  1935  [id number]    Assessment/Plan:  Acute congestive heart failure (HCC)  Dilated cardiomyopathy, HFrEF, LVEF 20%, LVIDd 5.4 cm, NYHA Class I, AHA Stage C, etiology is likely tachyarrhythmia  Neurohormonal Blockade:  --Beta Blocker: Toprol 50 mg BID  --ARNi / ACEi / ARB: Losartan 25 mg QD  --Aldosterone Antagonist: None  --SGLT2 Inhibitor: None  --Diuretic: Lasix 20 mg PRN     She self discontinued Lasix about 1 week ago for an undetermined reason.  She has rare orthopnea. Examines euvolemic. I again reiterated daily weights.  Can use Lasix on a as needed basis for fluid overload or weight gains (> 3 lbs overnight or 5 lbs in 1 week).  12/12 BMP good result     Persistent atrial fibrillation, 11/25/24  Severely dilated Left atrium on echo, 11/26/24  S/p ROSA/CV 11/27/24  Still in A-fib with controlled ventricular response, HR 78 bpm  Continue medications including Toprol 50 mg BID & amiodarone  Stroke prevention with Eliquis 2.5 mg twice daily    RTO for RN visit in 1 month for EKG. If she is still in A-fib, consider repeat CV as OP vs rate control alone.    Interval History:   Duong Reece is a 89 y.o. female with past medical history as below who presents to the office for continued CHF follow-up.    Patient reports with the addition of Lasix her shortness of breath greatly improved.  She had brisk urine output and urinary frequency.  She had weight loss.  She did not weigh herself every day.  About 1 week ago she self discontinued Lasix for an undetermined reason.  Despite this medication change she denies change in her breathing habit.  Patient denies shortness of breath or HANCOCK.  Rarely she will feel like she has to take some deep breaths when laying in bed.  Occasionally sits up in bed in order to get a deep breath.  Denies PND.    No  "bleeding issues on Eliquis.    Patient denies peripheral edema, abdominal distention, chest pain, pressure, tightness, palpitations, sensation of racing heartbeat or skipped heartbeat.  Patient occasionally has lightheadedness with changing positions too quickly.  No syncope.    I personally reviewed this patient's past medical history, social history, surgical history, family history, current medications and allergies.    Physical Exam:  Vitals:    12/17/24 1251   BP: 118/80   Pulse: 78     Vitals:    12/17/24 1251   Weight: 62.1 kg (137 lb)     Height: 5' 3\" (160 cm)   Body mass index is 24.27 kg/m².    Physical Exam  Vitals and nursing note reviewed.   Constitutional:       General: She is not in acute distress.  HENT:      Head: Normocephalic and atraumatic.      Nose: Nose normal. No congestion.      Mouth/Throat:      Mouth: Mucous membranes are dry.   Eyes:      Conjunctiva/sclera: Conjunctivae normal.   Neck:      Comments: - JVD or HJD  Cardiovascular:      Rate and Rhythm: Normal rate. Rhythm irregularly irregular.      Heart sounds: S1 normal and S2 normal. Heart sounds are distant.   Pulmonary:      Effort: Pulmonary effort is normal.      Breath sounds: No wheezing, rhonchi or rales.   Abdominal:      General: Abdomen is flat.   Musculoskeletal:         General: No swelling.   Skin:     General: Skin is warm and dry.   Neurological:      Mental Status: She is alert.   Psychiatric:      Comments: Cognitive changes in the older adult     Data:  Labs:     Chemistry       Component Value Date/Time     12/12/24    K 3.9 12/12/2024 0706     12/12/2024 0706    CO2 32 12/12/2024 0706    BUN 11 12/12/2024 0706    CREATININE 0.94 12/12/2024 0706        EKG: Atrial fibrillation with controlled ventricular response, 78 bpm.  Normal axis, chronic T wave inversion in lateral leads unchanged from prior.    Rekha Mariano PA-C  [facility]  "

## 2024-12-17 ENCOUNTER — OFFICE VISIT (OUTPATIENT)
Dept: CARDIOLOGY CLINIC | Facility: CLINIC | Age: 89
End: 2024-12-17
Payer: COMMERCIAL

## 2024-12-17 VITALS
WEIGHT: 137 LBS | HEART RATE: 78 BPM | SYSTOLIC BLOOD PRESSURE: 118 MMHG | DIASTOLIC BLOOD PRESSURE: 80 MMHG | BODY MASS INDEX: 24.27 KG/M2 | HEIGHT: 63 IN

## 2024-12-17 DIAGNOSIS — E78.2 MIXED HYPERLIPIDEMIA: ICD-10-CM

## 2024-12-17 DIAGNOSIS — I50.21 ACUTE SYSTOLIC CONGESTIVE HEART FAILURE (HCC): ICD-10-CM

## 2024-12-17 DIAGNOSIS — I48.91 ATRIAL FIBRILLATION WITH RAPID VENTRICULAR RESPONSE (HCC): ICD-10-CM

## 2024-12-17 DIAGNOSIS — I48.91 RAPID ATRIAL FIBRILLATION (HCC): Primary | ICD-10-CM

## 2024-12-17 PROCEDURE — 99214 OFFICE O/P EST MOD 30 MIN: CPT | Performed by: PHYSICIAN ASSISTANT

## 2024-12-17 PROCEDURE — 93000 ELECTROCARDIOGRAM COMPLETE: CPT | Performed by: PHYSICIAN ASSISTANT

## 2024-12-17 RX ORDER — APIXABAN 2.5 MG/1
2.5 TABLET, FILM COATED ORAL 2 TIMES DAILY
Qty: 60 TABLET | Refills: 6 | Status: CANCELLED | OUTPATIENT
Start: 2024-12-17

## 2024-12-17 RX ORDER — AMIODARONE HYDROCHLORIDE 200 MG/1
200 TABLET ORAL
Qty: 30 TABLET | Refills: 5 | Status: CANCELLED | OUTPATIENT
Start: 2024-12-17

## 2024-12-17 RX ORDER — ATORVASTATIN CALCIUM 20 MG/1
20 TABLET, FILM COATED ORAL DAILY
Qty: 90 TABLET | Refills: 3 | Status: CANCELLED | OUTPATIENT
Start: 2024-12-17

## 2024-12-17 RX ORDER — FUROSEMIDE 20 MG/1
20 TABLET ORAL AS NEEDED
Qty: 60 TABLET | Refills: 0 | Status: SHIPPED | OUTPATIENT
Start: 2024-12-17

## 2024-12-18 ENCOUNTER — TELEPHONE (OUTPATIENT)
Dept: CARDIOLOGY CLINIC | Facility: CLINIC | Age: 89
End: 2024-12-18

## 2025-01-13 ENCOUNTER — TELEPHONE (OUTPATIENT)
Dept: CARDIOLOGY CLINIC | Facility: CLINIC | Age: 89
End: 2025-01-13

## 2025-01-17 DIAGNOSIS — I48.91 ATRIAL FIBRILLATION WITH RAPID VENTRICULAR RESPONSE (HCC): ICD-10-CM

## 2025-01-17 RX ORDER — LOSARTAN POTASSIUM 25 MG/1
25 TABLET ORAL DAILY
Qty: 90 TABLET | Refills: 1 | Status: SHIPPED | OUTPATIENT
Start: 2025-01-17

## 2025-01-17 RX ORDER — METOPROLOL SUCCINATE 50 MG/1
50 TABLET, EXTENDED RELEASE ORAL EVERY 12 HOURS
Qty: 180 TABLET | Refills: 1 | Status: SHIPPED | OUTPATIENT
Start: 2025-01-17

## 2025-01-27 DIAGNOSIS — I48.91 ATRIAL FIBRILLATION WITH RAPID VENTRICULAR RESPONSE (HCC): ICD-10-CM

## 2025-01-27 NOTE — TELEPHONE ENCOUNTER
Fax refill request from [facility]  90 day refill amiodarone     Phone call to patient, confirmed request refill

## 2025-01-28 RX ORDER — AMIODARONE HYDROCHLORIDE 200 MG/1
200 TABLET ORAL
Qty: 90 TABLET | Refills: 3 | Status: SHIPPED | OUTPATIENT
Start: 2025-01-28

## 2025-04-01 DIAGNOSIS — I48.91 ATRIAL FIBRILLATION WITH RAPID VENTRICULAR RESPONSE (HCC): ICD-10-CM

## 2025-04-01 RX ORDER — FUROSEMIDE 20 MG/1
20 TABLET ORAL AS NEEDED
Qty: 60 TABLET | Refills: 0 | Status: SHIPPED | OUTPATIENT
Start: 2025-04-01

## 2025-05-13 ENCOUNTER — APPOINTMENT (OUTPATIENT)
Dept: NON INVASIVE DIAGNOSTICS | Facility: HOSPITAL | Age: 89
End: 2025-05-13
Payer: COMMERCIAL

## 2025-05-13 ENCOUNTER — HOSPITAL ENCOUNTER (OUTPATIENT)
Facility: HOSPITAL | Age: 89
Setting detail: OBSERVATION
Discharge: HOME | End: 2025-05-14
Attending: EMERGENCY MEDICINE | Admitting: STUDENT IN AN ORGANIZED HEALTH CARE EDUCATION/TRAINING PROGRAM
Payer: COMMERCIAL

## 2025-05-13 DIAGNOSIS — I10 PRIMARY HYPERTENSION: ICD-10-CM

## 2025-05-13 DIAGNOSIS — I48.91 ATRIAL FIBRILLATION WITH RAPID VENTRICULAR RESPONSE (HCC): ICD-10-CM

## 2025-05-13 DIAGNOSIS — E87.6 HYPOKALEMIA: ICD-10-CM

## 2025-05-13 DIAGNOSIS — R00.1 BRADYCARDIA: Primary | ICD-10-CM

## 2025-05-13 PROBLEM — I50.22 CHRONIC SYSTOLIC CONGESTIVE HEART FAILURE (HCC): Status: ACTIVE | Noted: 2024-11-26

## 2025-05-13 PROBLEM — I48.0 PAF (PAROXYSMAL ATRIAL FIBRILLATION) (HCC): Status: ACTIVE | Noted: 2024-11-25

## 2025-05-13 PROBLEM — I48.19 PERSISTENT ATRIAL FIBRILLATION (HCC): Status: ACTIVE | Noted: 2024-11-25

## 2025-05-13 LAB
2HR DELTA HS TROPONIN: 0 NG/L
ALBUMIN SERPL BCG-MCNC: 3.5 G/DL (ref 3.5–5)
ALP SERPL-CCNC: 77 U/L (ref 34–104)
ALT SERPL W P-5'-P-CCNC: 14 U/L (ref 7–52)
ANION GAP SERPL CALCULATED.3IONS-SCNC: 5 MMOL/L (ref 4–13)
AORTIC ROOT: 4.2 CM
APTT PPP: 29 SECONDS (ref 23–34)
ASCENDING AORTA: 4.3 CM
AST SERPL W P-5'-P-CCNC: 22 U/L (ref 13–39)
ATRIAL RATE: 42 BPM
ATRIAL RATE: 44 BPM
BASOPHILS # BLD AUTO: 0.02 THOUSANDS/ÂΜL (ref 0–0.1)
BASOPHILS NFR BLD AUTO: 0 % (ref 0–1)
BILIRUB SERPL-MCNC: 0.56 MG/DL (ref 0.2–1)
BNP SERPL-MCNC: 372 PG/ML (ref 0–100)
BSA FOR ECHO PROCEDURE: 1.64 M2
BUN SERPL-MCNC: 18 MG/DL (ref 5–25)
CALCIUM SERPL-MCNC: 8.3 MG/DL (ref 8.4–10.2)
CARDIAC TROPONIN I PNL SERPL HS: 8 NG/L (ref ?–50)
CARDIAC TROPONIN I PNL SERPL HS: 8 NG/L (ref ?–50)
CHLORIDE SERPL-SCNC: 109 MMOL/L (ref 96–108)
CO2 SERPL-SCNC: 27 MMOL/L (ref 21–32)
CREAT SERPL-MCNC: 0.97 MG/DL (ref 0.6–1.3)
EOSINOPHIL # BLD AUTO: 0.05 THOUSAND/ÂΜL (ref 0–0.61)
EOSINOPHIL NFR BLD AUTO: 1 % (ref 0–6)
ERYTHROCYTE [DISTWIDTH] IN BLOOD BY AUTOMATED COUNT: 13.6 % (ref 11.6–15.1)
GFR SERPL CREATININE-BSD FRML MDRD: 52 ML/MIN/1.73SQ M
GLUCOSE SERPL-MCNC: 97 MG/DL (ref 65–140)
HCT VFR BLD AUTO: 37.2 % (ref 34.8–46.1)
HGB BLD-MCNC: 12.2 G/DL (ref 11.5–15.4)
IMM GRANULOCYTES # BLD AUTO: 0.02 THOUSAND/UL (ref 0–0.2)
IMM GRANULOCYTES NFR BLD AUTO: 0 % (ref 0–2)
INR PPP: 1.13 (ref 0.85–1.19)
LEFT VENTRICLE DIASTOLIC VOLUME (MOD BIPLANE): 110 ML
LEFT VENTRICLE DIASTOLIC VOLUME INDEX (MOD BIPLANE): 67.1 ML/M2
LEFT VENTRICLE SYSTOLIC VOLUME (MOD BIPLANE): 45 ML
LEFT VENTRICLE SYSTOLIC VOLUME INDEX (MOD BIPLANE): 27.4 ML/M2
LEFT VENTRICULAR INTERNAL DIMENSION IN DIASTOLE: 4.3 CM (ref 3.5–6)
LV EF BIPLANE MOD: 59 %
LV EF US.2D.A4C+ESTIMATED: 59 %
LYMPHOCYTES # BLD AUTO: 2.29 THOUSANDS/ÂΜL (ref 0.6–4.47)
LYMPHOCYTES NFR BLD AUTO: 37 % (ref 14–44)
MAGNESIUM SERPL-MCNC: 2 MG/DL (ref 1.9–2.7)
MCH RBC QN AUTO: 30.3 PG (ref 26.8–34.3)
MCHC RBC AUTO-ENTMCNC: 32.8 G/DL (ref 31.4–37.4)
MCV RBC AUTO: 93 FL (ref 82–98)
MONOCYTES # BLD AUTO: 0.44 THOUSAND/ÂΜL (ref 0.17–1.22)
MONOCYTES NFR BLD AUTO: 7 % (ref 4–12)
NEUTROPHILS # BLD AUTO: 3.43 THOUSANDS/ÂΜL (ref 1.85–7.62)
NEUTS SEG NFR BLD AUTO: 55 % (ref 43–75)
NRBC BLD AUTO-RTO: 0 /100 WBCS
P AXIS: 75 DEGREES
P AXIS: 79 DEGREES
PLATELET # BLD AUTO: 204 THOUSANDS/UL (ref 149–390)
PMV BLD AUTO: 10.7 FL (ref 8.9–12.7)
POTASSIUM SERPL-SCNC: 3.4 MMOL/L (ref 3.5–5.3)
PR INTERVAL: 194 MS
PR INTERVAL: 194 MS
PROT SERPL-MCNC: 5.8 G/DL (ref 6.4–8.4)
PROTHROMBIN TIME: 14.7 SECONDS (ref 12.3–15)
QRS AXIS: 57 DEGREES
QRS AXIS: 78 DEGREES
QRSD INTERVAL: 90 MS
QRSD INTERVAL: 94 MS
QT INTERVAL: 556 MS
QT INTERVAL: 558 MS
QTC INTERVAL: 465 MS
QTC INTERVAL: 475 MS
RBC # BLD AUTO: 4.02 MILLION/UL (ref 3.81–5.12)
RIGHT VENTRICLE ID DIMENSION: 4 CM
SL CV LV EF: 45
SODIUM SERPL-SCNC: 141 MMOL/L (ref 135–147)
T WAVE AXIS: 72 DEGREES
T WAVE AXIS: 87 DEGREES
TR MAX PG: 31 MMHG
TR PEAK VELOCITY: 2.8 M/S
TRICUSPID VALVE PEAK REGURGITATION VELOCITY: 2.78 M/S
TSH SERPL DL<=0.05 MIU/L-ACNC: 1.26 UIU/ML (ref 0.45–4.5)
VENTRICULAR RATE: 42 BPM
VENTRICULAR RATE: 44 BPM
WBC # BLD AUTO: 6.25 THOUSAND/UL (ref 4.31–10.16)

## 2025-05-13 PROCEDURE — 93308 TTE F-UP OR LMTD: CPT

## 2025-05-13 PROCEDURE — 93321 DOPPLER ECHO F-UP/LMTD STD: CPT

## 2025-05-13 PROCEDURE — 93010 ELECTROCARDIOGRAM REPORT: CPT | Performed by: STUDENT IN AN ORGANIZED HEALTH CARE EDUCATION/TRAINING PROGRAM

## 2025-05-13 PROCEDURE — 93005 ELECTROCARDIOGRAM TRACING: CPT

## 2025-05-13 PROCEDURE — 99285 EMERGENCY DEPT VISIT HI MDM: CPT | Performed by: EMERGENCY MEDICINE

## 2025-05-13 PROCEDURE — 85730 THROMBOPLASTIN TIME PARTIAL: CPT | Performed by: EMERGENCY MEDICINE

## 2025-05-13 PROCEDURE — 93321 DOPPLER ECHO F-UP/LMTD STD: CPT | Performed by: STUDENT IN AN ORGANIZED HEALTH CARE EDUCATION/TRAINING PROGRAM

## 2025-05-13 PROCEDURE — 99215 OFFICE O/P EST HI 40 MIN: CPT | Performed by: STUDENT IN AN ORGANIZED HEALTH CARE EDUCATION/TRAINING PROGRAM

## 2025-05-13 PROCEDURE — 84484 ASSAY OF TROPONIN QUANT: CPT | Performed by: EMERGENCY MEDICINE

## 2025-05-13 PROCEDURE — 99223 1ST HOSP IP/OBS HIGH 75: CPT | Performed by: STUDENT IN AN ORGANIZED HEALTH CARE EDUCATION/TRAINING PROGRAM

## 2025-05-13 PROCEDURE — 93308 TTE F-UP OR LMTD: CPT | Performed by: STUDENT IN AN ORGANIZED HEALTH CARE EDUCATION/TRAINING PROGRAM

## 2025-05-13 PROCEDURE — 83880 ASSAY OF NATRIURETIC PEPTIDE: CPT | Performed by: EMERGENCY MEDICINE

## 2025-05-13 PROCEDURE — 36415 COLL VENOUS BLD VENIPUNCTURE: CPT | Performed by: EMERGENCY MEDICINE

## 2025-05-13 PROCEDURE — 85610 PROTHROMBIN TIME: CPT | Performed by: EMERGENCY MEDICINE

## 2025-05-13 PROCEDURE — 85025 COMPLETE CBC W/AUTO DIFF WBC: CPT | Performed by: EMERGENCY MEDICINE

## 2025-05-13 PROCEDURE — 99284 EMERGENCY DEPT VISIT MOD MDM: CPT

## 2025-05-13 PROCEDURE — 93325 DOPPLER ECHO COLOR FLOW MAPG: CPT

## 2025-05-13 PROCEDURE — 93325 DOPPLER ECHO COLOR FLOW MAPG: CPT | Performed by: STUDENT IN AN ORGANIZED HEALTH CARE EDUCATION/TRAINING PROGRAM

## 2025-05-13 PROCEDURE — 83735 ASSAY OF MAGNESIUM: CPT | Performed by: EMERGENCY MEDICINE

## 2025-05-13 PROCEDURE — 84443 ASSAY THYROID STIM HORMONE: CPT | Performed by: EMERGENCY MEDICINE

## 2025-05-13 PROCEDURE — 80053 COMPREHEN METABOLIC PANEL: CPT | Performed by: EMERGENCY MEDICINE

## 2025-05-13 RX ORDER — LATANOPROST 50 UG/ML
1 SOLUTION/ DROPS OPHTHALMIC
Status: DISCONTINUED | OUTPATIENT
Start: 2025-05-13 | End: 2025-05-14 | Stop reason: HOSPADM

## 2025-05-13 RX ORDER — SENNOSIDES 8.6 MG
325 CAPSULE ORAL DAILY
COMMUNITY

## 2025-05-13 RX ORDER — FUROSEMIDE 20 MG/1
20 TABLET ORAL DAILY
Status: DISCONTINUED | OUTPATIENT
Start: 2025-05-13 | End: 2025-05-13

## 2025-05-13 RX ORDER — ACETAMINOPHEN 325 MG/1
650 TABLET ORAL EVERY 6 HOURS PRN
Status: DISCONTINUED | OUTPATIENT
Start: 2025-05-13 | End: 2025-05-14 | Stop reason: HOSPADM

## 2025-05-13 RX ORDER — TRAVOPROST OPHTHALMIC SOLUTION 0.04 MG/ML
1 SOLUTION OPHTHALMIC
Status: DISCONTINUED | OUTPATIENT
Start: 2025-05-14 | End: 2025-05-14 | Stop reason: HOSPADM

## 2025-05-13 RX ORDER — AMLODIPINE BESYLATE 5 MG/1
5 TABLET ORAL DAILY
Status: DISCONTINUED | OUTPATIENT
Start: 2025-05-13 | End: 2025-05-14

## 2025-05-13 RX ORDER — LOSARTAN POTASSIUM 25 MG/1
25 TABLET ORAL DAILY
Status: DISCONTINUED | OUTPATIENT
Start: 2025-05-14 | End: 2025-05-14

## 2025-05-13 RX ORDER — ALPRAZOLAM 0.5 MG
0.5 TABLET ORAL 3 TIMES DAILY PRN
Status: DISCONTINUED | OUTPATIENT
Start: 2025-05-13 | End: 2025-05-14 | Stop reason: HOSPADM

## 2025-05-13 RX ORDER — ATORVASTATIN CALCIUM 20 MG/1
20 TABLET, FILM COATED ORAL
Status: DISCONTINUED | OUTPATIENT
Start: 2025-05-14 | End: 2025-05-14 | Stop reason: HOSPADM

## 2025-05-13 RX ORDER — POTASSIUM CHLORIDE 1500 MG/1
40 TABLET, EXTENDED RELEASE ORAL ONCE
Status: COMPLETED | OUTPATIENT
Start: 2025-05-13 | End: 2025-05-13

## 2025-05-13 RX ORDER — AMIODARONE HYDROCHLORIDE 200 MG/1
200 TABLET ORAL
Status: DISCONTINUED | OUTPATIENT
Start: 2025-05-14 | End: 2025-05-14

## 2025-05-13 RX ORDER — ONDANSETRON 2 MG/ML
4 INJECTION INTRAMUSCULAR; INTRAVENOUS EVERY 6 HOURS PRN
Status: DISCONTINUED | OUTPATIENT
Start: 2025-05-13 | End: 2025-05-14 | Stop reason: HOSPADM

## 2025-05-13 RX ORDER — DESVENLAFAXINE 50 MG/1
50 TABLET, FILM COATED, EXTENDED RELEASE ORAL DAILY
Status: DISCONTINUED | OUTPATIENT
Start: 2025-05-14 | End: 2025-05-14 | Stop reason: HOSPADM

## 2025-05-13 RX ORDER — SENNOSIDES 8.6 MG
325 CAPSULE ORAL DAILY
Status: DISCONTINUED | OUTPATIENT
Start: 2025-05-14 | End: 2025-05-14 | Stop reason: HOSPADM

## 2025-05-13 RX ADMIN — AMLODIPINE BESYLATE 5 MG: 5 TABLET ORAL at 13:28

## 2025-05-13 RX ADMIN — LATANOPROST 1 DROP: 50 SOLUTION OPHTHALMIC at 21:38

## 2025-05-13 RX ADMIN — APIXABAN 2.5 MG: 2.5 TABLET, FILM COATED ORAL at 21:39

## 2025-05-13 RX ADMIN — ALPRAZOLAM 0.5 MG: 0.5 TABLET ORAL at 23:29

## 2025-05-13 RX ADMIN — POTASSIUM CHLORIDE 40 MEQ: 1500 TABLET, EXTENDED RELEASE ORAL at 11:51

## 2025-05-13 NOTE — ASSESSMENT & PLAN NOTE
Continue Pristiq and xanax as needed   Report to Kacy AVERY [address] [place] [address]     Shaheen Jones RN  08/05/22 [phone]

## 2025-05-13 NOTE — ED NOTES
Pt's daughter, Nelly, contacted by this RN on behalf of the pt.      Dana Berrios, THANIA  05/13/25 [id number]

## 2025-05-13 NOTE — ED PROVIDER NOTES
Time reflects when diagnosis was documented in both MDM as applicable and the Disposition within this note       Time User Action Codes Description Comment    5/13/2025 11:04 AM Brad Perez [R00.1] Bradycardia     5/13/2025 12:13 PM Brad Perez [E87.6] Hypokalemia           ED Disposition       ED Disposition   Admit    Condition   Stable    Date/Time   Tue May 13, 2025 12:13 PM    Comment   Case was discussed with KIRBY and the patient's admission status was agreed to be Admission Status: observation status to the service of Dr. Bush .               Assessment & Plan       Medical Decision Making  Amount and/or Complexity of Data Reviewed  Labs: ordered. Decision-making details documented in ED Course.    Risk  Prescription drug management.  Decision regarding hospitalization.    Patient with severe bradycardia however hemodynamically stable and her normal mental status at the moment.  She has a mildly low potassium which we can replace.  Most likely caused by her medications including the metoprolol and the amiodarone which may need to be held.  Cardiology was consulted.  Pacer pads were placed on the patient but we are not pacing the patient at the moment.  She actually does not want any type of permanent pacemaker if that is considered an option in her care.  She will be admitted and observed and her medications held and if her heart rate improves and there are no changes in her blood pressure could potentially go home the next day.  Troponin was negative.  She is not having any chest pain or shortness of breath.    ED Course as of 05/13/25 1621   Tue May 13, 2025   1057 EKG from the primary doctor's office appears to be sinus bradycardia at a rate of 35.  When she arrives here she is awake and alert with a blood pressure 193/87 heart rate in the 40s and appears to have P waves before each QRS with nothing intermittent.  Pacer pads placed.  Patient states she is a DNR DNI and at  this point would except transcutaneous pacing but states she does not want a pacemaker.  Will discuss with cardiology checking blood work placed on the monitor.   1147 Potassium(!): 3.4  Potassium slightly low can replace orally.  No other major electrolyte abnormalities.  Magnesium is normal.   1213 Comprehensive metabolic panel(!)  Mildly low potassium can replace orally.  No other major electrolyte abnormalities.   1214 CBC and differential  Normal white count no fever to suggest any significant infection.  Hemoglobin is stable.   1215 hs TnI 0hr: 8  Normal.   1215 BNP(!): 372  Clinically no evidence of heart failure however has an EF of about 20% per last cardiac evaluation.   1215 MAGNESIUM: 2.0  Normal.       Medications   ALPRAZolam (XANAX) tablet 0.5 mg (has no administration in time range)   amiodarone tablet 200 mg (has no administration in time range)   aspirin (ECOTRIN) EC tablet 325 mg (has no administration in time range)   atorvastatin (LIPITOR) tablet 20 mg (has no administration in time range)   desvenlafaxine succinate (PRISTIQ) 24 hr tablet 50 mg (has no administration in time range)   apixaban (ELIQUIS) tablet 2.5 mg (has no administration in time range)   furosemide (LASIX) tablet 20 mg (20 mg Oral Not Given 5/13/25 1325)   latanoprost (XALATAN) 0.005 % ophthalmic solution 1 drop (has no administration in time range)   losartan (COZAAR) tablet 25 mg (has no administration in time range)   travoprost (TRAVATAN-Z) 0.004 % ophthalmic solution 1 drop (has no administration in time range)   acetaminophen (TYLENOL) tablet 650 mg (has no administration in time range)   ondansetron (ZOFRAN) injection 4 mg (has no administration in time range)   amLODIPine (NORVASC) tablet 5 mg (5 mg Oral Given 5/13/25 1328)   potassium chloride (Klor-Con M20) CR tablet 40 mEq (40 mEq Oral Given 5/13/25 1151)       ED Risk Strat Scores                    No data recorded        SBIRT 22yo+      Flowsheet Row Most Recent  Value   Initial Alcohol Screen: US AUDIT-C     1. How often do you have a drink containing alcohol? 0 Filed at: 05/13/2025 1050   2. How many drinks containing alcohol do you have on a typical day you are drinking?  0 Filed at: 05/13/2025 1050   3b. FEMALE Any Age, or MALE 65+: How often do you have 4 or more drinks on one occassion? 0 Filed at: 05/13/2025 1050   Audit-C Score 0 Filed at: 05/13/2025 1050   BRIANA: How many times in the past year have you...    Used an illegal drug or used a prescription medication for non-medical reasons? Never Filed at: 05/13/2025 1050                            History of Present Illness       Chief Complaint   Patient presents with    Slow Heart Rate     Pt arrives via EMS from pcp for a follow appointment. ECG was done and pt was found to have a heart rate in the 30's. Pt asymptomatic.        Past Medical History:   Diagnosis Date    Anxiety     Atrial fibrillation (HCC)     Depression     Glaucoma     Hyperlipemia       Past Surgical History:   Procedure Laterality Date    APPENDECTOMY      HYSTERECTOMY        Family History   Problem Relation Age of Onset    Hypertension Mother     Depression Mother     Stroke Father     Alcohol abuse Son     Substance Abuse Son       Social History     Tobacco Use    Smoking status: Never    Smokeless tobacco: Never   Vaping Use    Vaping status: Never Used   Substance Use Topics    Alcohol use: Yes     Comment: Rarely    Drug use: No      E-Cigarette/Vaping    E-Cigarette Use Never User       E-Cigarette/Vaping Substances      I have reviewed and agree with the history as documented.     HPI  Patient coming in from the primary care doctor's office where she was being evaluated just as a routine checkup and found to have a heart rate of 35.  Has a history of atrial fibrillation is on metoprolol twice daily, amiodarone, and Eliquis.  Her blood pressure was fine she was mentating normally and she is asymptomatic.  She is not having any chest pain  shortness of breath lightheaded or dizziness.  She has a history of an EF of 20%.  She is a DNR/DNI and does not want any acute intervention.  She has been taking her medications as normal and did take all of her medications this morning.  She has had no fevers or chills.  She has been eating and drinking.  Review of Systems        Objective       ED Triage Vitals   Temperature Pulse Blood Pressure Respirations SpO2 Patient Position - Orthostatic VS   05/13/25 1100 05/13/25 1051 05/13/25 1051 05/13/25 1051 05/13/25 1051 05/13/25 1051   98.6 °F (37 °C) (!) 44 (!) 193/87 18 98 % Lying      Temp Source Heart Rate Source BP Location FiO2 (%) Pain Score    05/13/25 1100 05/13/25 1051 05/13/25 1051 -- 05/13/25 1051    Oral Monitor Right arm  No Pain      Vitals      Date and Time Temp Pulse SpO2 Resp BP Pain Score FACES Pain Rating User   05/13/25 1515 -- 34 96 % 21 174/74 -- -- BM   05/13/25 1430 -- 48 97 % 45 173/74 -- -- LB   05/13/25 1400 -- 37 99 % 21 172/74 -- -- LB   05/13/25 1330 -- 39 98 % 20 165/77 -- -- LB   05/13/25 1328 -- -- -- -- 165/77 -- -- LB   05/13/25 1230 98.6 °F (37 °C) 37 97 % 20 177/77 No Pain -- LB   05/13/25 1145 -- 41 99 % 17 180/76 -- -- LB   05/13/25 1130 -- 38 97 % 17 165/77 -- -- LB   05/13/25 1100 98.6 °F (37 °C) 40 99 % 19 193/87 -- -- LB   05/13/25 1051 -- 44 98 % 18 193/87 No Pain -- LB            Physical Exam  Vitals and nursing note reviewed.   Constitutional:       General: She is not in acute distress.     Appearance: Normal appearance. She is well-developed. She is not ill-appearing, toxic-appearing or diaphoretic.   HENT:      Head: Normocephalic and atraumatic.      Right Ear: Hearing normal. No drainage or swelling.      Left Ear: Hearing normal. No drainage or swelling.   Eyes:      General: Lids are normal.         Right eye: No discharge.         Left eye: No discharge.      Conjunctiva/sclera: Conjunctivae normal.   Neck:      Vascular: No JVD.      Trachea: Trachea normal.    Cardiovascular:      Rate and Rhythm: Regular rhythm. Bradycardia present.      Pulses: Normal pulses.      Heart sounds: Normal heart sounds. No murmur heard.     No friction rub. No gallop.   Pulmonary:      Effort: Pulmonary effort is normal. No respiratory distress.      Breath sounds: Normal breath sounds. No stridor. No wheezing or rales.   Abdominal:      Palpations: Abdomen is soft.      Tenderness: There is no abdominal tenderness. There is no guarding or rebound.   Musculoskeletal:         General: Normal range of motion.      Cervical back: Normal range of motion.      Right lower leg: No edema.      Left lower leg: No edema.   Skin:     General: Skin is warm and dry.      Coloration: Skin is not pale.      Findings: No rash.   Neurological:      General: No focal deficit present.      Mental Status: She is alert.      GCS: GCS eye subscore is 4. GCS verbal subscore is 5. GCS motor subscore is 6.      Sensory: No sensory deficit.      Motor: No weakness or abnormal muscle tone.      Comments: Normal mental status   Psychiatric:         Mood and Affect: Mood normal.         Speech: Speech normal.         Behavior: Behavior is cooperative.         Results Reviewed       Procedure Component Value Units Date/Time    HS Troponin I 2hr [[id number]]  (Normal) Collected: 05/13/25 1253    Lab Status: Final result Specimen: Blood from Arm, Left Updated: 05/13/25 1322     hs TnI 2hr 8 ng/L      Delta 2hr hsTnI 0 ng/L     Protime-INR [[id number]]  (Normal) Collected: 05/13/25 1100    Lab Status: Final result Specimen: Blood from Arm, Left Updated: 05/13/25 1255     Protime 14.7 seconds      INR 1.13    Narrative:      INR Therapeutic Range    Indication                                             INR Range      Atrial Fibrillation                                               2.0-3.0  Hypercoagulable State                                    2.0.2.3  Left Ventricular Asist Device                             2.0-3.0  Mechanical Heart Valve                                  -    Aortic(with afib, MI, embolism, HF, LA enlargement,    and/or coagulopathy)                                     2.0-3.0 (2.5-3.5)     Mitral                                                             2.5-3.5  Prosthetic/Bioprosthetic Heart Valve               2.0-3.0  Venous thromboembolism (VTE: VT, PE        2.0-3.0    APTT [[id number]]  (Normal) Collected: 05/13/25 1100    Lab Status: Final result Specimen: Blood from Arm, Left Updated: 05/13/25 1255     PTT 29 seconds     TSH, 3rd generation with Free T4 reflex [[id number]]  (Normal) Collected: 05/13/25 1100    Lab Status: Final result Specimen: Blood from Arm, Left Updated: 05/13/25 1214     TSH 3RD GENERATON 1.261 uIU/mL     CBC and differential [[id number]] Collected: 05/13/25 1206    Lab Status: Final result Specimen: Blood from Arm, Left Updated: 05/13/25 1211     WBC 6.25 Thousand/uL      RBC 4.02 Million/uL      Hemoglobin 12.2 g/dL      Hematocrit 37.2 %      MCV 93 fL      MCH 30.3 pg      MCHC 32.8 g/dL      RDW 13.6 %      MPV 10.7 fL      Platelets 204 Thousands/uL      nRBC 0 /100 WBCs      Segmented % 55 %      Immature Grans % 0 %      Lymphocytes % 37 %      Monocytes % 7 %      Eosinophils Relative 1 %      Basophils Relative 0 %      Absolute Neutrophils 3.43 Thousands/µL      Absolute Immature Grans 0.02 Thousand/uL      Absolute Lymphocytes 2.29 Thousands/µL      Absolute Monocytes 0.44 Thousand/µL      Eosinophils Absolute 0.05 Thousand/µL      Basophils Absolute 0.02 Thousands/µL     B-Type Natriuretic Peptide(BNP) [[id number]]  (Abnormal) Collected: 05/13/25 1100    Lab Status: Final result Specimen: Blood from Arm, Left Updated: 05/13/25 1210      pg/mL     HS Troponin 0hr (reflex protocol) [[id number]]  (Normal) Collected: 05/13/25 1100    Lab Status: Final result Specimen: Blood from Arm, Left Updated: 05/13/25 1130     hs TnI 0hr 8 ng/L     Comprehensive metabolic  panel [[id number]]  (Abnormal) Collected: 05/13/25 1100    Lab Status: Final result Specimen: Blood from Arm, Left Updated: 05/13/25 1126     Sodium 141 mmol/L      Potassium 3.4 mmol/L      Chloride 109 mmol/L      CO2 27 mmol/L      ANION GAP 5 mmol/L      BUN 18 mg/dL      Creatinine 0.97 mg/dL      Glucose 97 mg/dL      Calcium 8.3 mg/dL      AST 22 U/L      ALT 14 U/L      Alkaline Phosphatase 77 U/L      Total Protein 5.8 g/dL      Albumin 3.5 g/dL      Total Bilirubin 0.56 mg/dL      eGFR 52 ml/min/1.73sq m     Narrative:      [employer] guidelines for Chronic Kidney Disease (CKD):     Stage 1 with normal or high GFR (GFR > 90 mL/min/1.73 square meters)    Stage 2 Mild CKD (GFR = 60-89 mL/min/1.73 square meters)    Stage 3A Moderate CKD (GFR = 45-59 mL/min/1.73 square meters)    Stage 3B Moderate CKD (GFR = 30-44 mL/min/1.73 square meters)    Stage 4 Severe CKD (GFR = 15-29 mL/min/1.73 square meters)    Stage 5 End Stage CKD (GFR <15 mL/min/1.73 square meters)  Note: GFR calculation is accurate only with a steady state creatinine    Magnesium [[id number]]  (Normal) Collected: 05/13/25 1100    Lab Status: Final result Specimen: Blood from Arm, Left Updated: 05/13/25 1126     Magnesium 2.0 mg/dL             No orders to display       ECG 12 Lead Documentation Only    Date/Time: 5/13/2025 10:59 AM    Performed by: Brad Perez MD  Authorized by: Brad Perez MD    Indications / Diagnosis:  Bradycardia  ECG reviewed by me, the ED Provider: yes    Patient location:  ED  Interpretation:     Interpretation: abnormal    Rate:     ECG rate:  44    ECG rate assessment: bradycardic    Rhythm:     Rhythm: sinus bradycardia    Ectopy:     Ectopy: none    QRS:     QRS axis:  Normal    QRS intervals:  Normal  Conduction:     Conduction: normal    ST segments:     ST segments:  Non-specific  T waves:     T waves: non-specific        ED Medication and Procedure Management    Prior to Admission Medications   Prescriptions Last Dose Informant Patient Reported? Taking?   ALPRAZolam (XANAX) 0.5 mg tablet Past Week Self No Yes   Sig: TAKE 1 TABLET BY MOUTH EVERY 4 TO 6 HOURS AS NEEDED   Eliquis 2.5 MG 5/13/2025 Self No Yes   Sig: Take 1 tablet (2.5 mg total) by mouth 2 (two) times a day   MULTIPLE VITAMINS-CALCIUM PO 5/13/2025 Self Yes Yes   Sig: Take 1 tablet by mouth daily   TRAVATAN Z 0.004 % ophthalmic solution 5/13/2025 Self Yes Yes   Sig: Administer 1 drop to both eyes daily at bedtime   amiodarone 200 mg tablet 5/13/2025  No Yes   Sig: Take 1 tablet (200 mg total) by mouth daily with breakfast   aspirin (ECOTRIN) 325 mg EC tablet 5/13/2025 Outside Facility (Specify) Yes Yes   Sig: Take 325 mg by mouth daily   atorvastatin (LIPITOR) 20 mg tablet 5/13/2025 Self No Yes   Sig: TAKE 1 TABLET BY MOUTH EVERY DAY   desvenlafaxine succinate (PRISTIQ) 50 mg 24 hr tablet 5/13/2025 Self No Yes   Sig: Take 1 tablet (50 mg total) by mouth daily   furosemide (LASIX) 20 mg tablet 5/13/2025  No Yes   Sig: Take 1 tablet (20 mg total) by mouth if needed (leg or ankle swelling, SOB, weight gain 3lbs in 1 day or 5 lbs in 1 week)   latanoprost (XALATAN) 0.005 % ophthalmic solution 5/13/2025 Self Yes Yes   Sig: INSTILL 1 DROP INTO BOTH EYES AT BEDTIME   losartan (COZAAR) 25 mg tablet 5/13/2025  No Yes   Sig: TAKE 1 TABLET (25 MG TOTAL) BY MOUTH DAILY.   metoprolol succinate (TOPROL-XL) 50 mg 24 hr tablet 5/13/2025  No Yes   Sig: TAKE 1 TABLET BY MOUTH EVERY 12 HOURS.      Facility-Administered Medications: None     Current Discharge Medication List        CONTINUE these medications which have NOT CHANGED    Details   ALPRAZolam (XANAX) 0.5 mg tablet TAKE 1 TABLET BY MOUTH EVERY 4 TO 6 HOURS AS NEEDED  Qty: 50 tablet, Refills: 1    Associated Diagnoses: Anxiety      amiodarone 200 mg tablet Take 1 tablet (200 mg total) by mouth daily with breakfast  Qty: 90 tablet, Refills: 3    Associated Diagnoses:  Atrial fibrillation with rapid ventricular response (HCC)      aspirin (ECOTRIN) 325 mg EC tablet Take 325 mg by mouth daily      atorvastatin (LIPITOR) 20 mg tablet TAKE 1 TABLET BY MOUTH EVERY DAY  Qty: 90 tablet, Refills: 3    Associated Diagnoses: Mixed hyperlipidemia      desvenlafaxine succinate (PRISTIQ) 50 mg 24 hr tablet Take 1 tablet (50 mg total) by mouth daily  Qty: 90 tablet, Refills: 1    Associated Diagnoses: Depression, unspecified depression type      Eliquis 2.5 MG Take 1 tablet (2.5 mg total) by mouth 2 (two) times a day  Qty: 60 tablet, Refills: 6    Associated Diagnoses: Rapid atrial fibrillation (HCC); Acute systolic congestive heart failure (HCC)      furosemide (LASIX) 20 mg tablet Take 1 tablet (20 mg total) by mouth if needed (leg or ankle swelling, SOB, weight gain 3lbs in 1 day or 5 lbs in 1 week)  Qty: 60 tablet, Refills: 0    Associated Diagnoses: Atrial fibrillation with rapid ventricular response (HCC)      latanoprost (XALATAN) 0.005 % ophthalmic solution INSTILL 1 DROP INTO BOTH EYES AT BEDTIME      losartan (COZAAR) 25 mg tablet TAKE 1 TABLET (25 MG TOTAL) BY MOUTH DAILY.  Qty: 90 tablet, Refills: 1    Associated Diagnoses: Atrial fibrillation with rapid ventricular response (HCC)      metoprolol succinate (TOPROL-XL) 50 mg 24 hr tablet TAKE 1 TABLET BY MOUTH EVERY 12 HOURS.  Qty: 180 tablet, Refills: 1    Associated Diagnoses: Atrial fibrillation with rapid ventricular response (HCC)      MULTIPLE VITAMINS-CALCIUM PO Take 1 tablet by mouth daily      TRAVATAN Z 0.004 % ophthalmic solution Administer 1 drop to both eyes daily at bedtime  Refills: 5           No discharge procedures on file.  ED SEPSIS DOCUMENTATION   Time reflects when diagnosis was documented in both MDM as applicable and the Disposition within this note       Time User Action Codes Description Comment    5/13/2025 11:04 AM Brad Perez Add [R00.1] Bradycardia     5/13/2025 12:13 PM Ana  Christopher J Add [E87.6] Hypokalemia                  Brad Perez MD  05/13/25 2559

## 2025-05-13 NOTE — ASSESSMENT & PLAN NOTE
Wt Readings from Last 3 Encounters:   05/13/25 62 kg (136 lb 11 oz)   12/17/24 62.1 kg (137 lb)   12/02/24 65.5 kg (144 lb 6.4 oz)     Appears euvolemic  Continue lasix 20mg daily, hold toprol due to bradycardia

## 2025-05-13 NOTE — ASSESSMENT & PLAN NOTE
S/p cardioversion in 2024 but had remained in A-fib  Had been rate controlled with Toprol-XL 50 mg twice a day and amiodarone 200 mg daily  Holding metoprolol in the setting of bradycardia  Continue Eliquis for anticoagulation

## 2025-05-13 NOTE — H&P
H&P - Hospitalist   Name: Duong eRece 89 y.o. female I MRN: [id number]  Unit/Bed#: ED-16 I Date of Admission: 5/13/2025   Date of Service: 5/13/2025 I Hospital Day: 0     Assessment & Plan  Asymptomatic bradycardia  89-year-old female with PMH of depression, A-fib and heart failure presented with asymptomatic bradycardia  Had a scheduled appointment with her PCP today, vitals showed bradycardia and sent via EMS  Currently without symptoms, denying any chest pain, palpitations, dyspnea or lightheadedness/dizziness  Hold Toprol-XL 50 mg twice daily, monitor on telemetry  Consult cardiology  Depression with anxiety  Continue Pristiq and xanax as needed  Mixed hyperlipidemia  Continue statin  Persistent atrial fibrillation (HCC)  S/p cardioversion in 2024 but had remained in A-fib  Had been rate controlled with Toprol-XL 50 mg twice a day and amiodarone 200 mg daily  Holding metoprolol in the setting of bradycardia  Continue Eliquis for anticoagulation  Chronic systolic congestive heart failure (HCC)  Wt Readings from Last 3 Encounters:   05/13/25 62 kg (136 lb 11 oz)   12/17/24 62.1 kg (137 lb)   12/02/24 65.5 kg (144 lb 6.4 oz)     Appears euvolemic  Continue lasix 20mg daily, hold toprol due to bradycardia  Hypokalemia  Replete, check mag  Primary hypertension  Elevated on admission  Continue losartan 25mg, add amlodipine 5mg  Continue to monitor      VTE Pharmacologic Prophylaxis:   Moderate Risk (Score 3-4) - Pharmacological DVT Prophylaxis Ordered: apixaban (Eliquis).  Code Status: Level 3 - DNAR and DNI     Anticipated Length of Stay: Patient will be admitted on an observation basis with an anticipated length of stay of less than 2 midnights secondary to monitor on telemetry.    History of Present Illness   Chief Complaint: Bradycardia    Duong Reece is a 89 y.o. female with a PMH of A-fib, depression, hyperlipidemia and hypertension who presents with asymptomatic bradycardia.  Patient was at a visit to her PCP  today and sent via EMS once vitals were checked and she was noted to be bradycardic.  Currently denies any complaints of chest pain, palpitations, dyspnea, lightheadedness or dizziness.  She reports taking her medications including Toprol-XL 50 mg this morning.  Blood pressure is elevated which she reports typically does not occur.  She does live at home alone, ambulates independently and continues to drive.    Review of Systems   All other systems reviewed and are negative.      Historical Information   Past Medical History:   Diagnosis Date    Anxiety     Atrial fibrillation (HCC)     Depression     Glaucoma     Hyperlipemia      Past Surgical History:   Procedure Laterality Date    APPENDECTOMY      HYSTERECTOMY       Social History     Tobacco Use    Smoking status: Never    Smokeless tobacco: Never   Vaping Use    Vaping status: Never Used   Substance and Sexual Activity    Alcohol use: Yes     Comment: Rarely    Drug use: No    Sexual activity: Not on file     E-Cigarette/Vaping    E-Cigarette Use Never User      E-Cigarette/Vaping Substances     Family history non-contributory  Social History:  Marital Status:    Occupation: retired  Patient Pre-hospital Living Situation: Home  Patient Pre-hospital Level of Mobility: walks  Patient Pre-hospital Diet Restrictions: none    Meds/Allergies   I have reviewed home medications with patient personally.  Prior to Admission medications    Medication Sig Start Date End Date Taking? Authorizing Provider   ALPRAZolam (XANAX) 0.5 mg tablet TAKE 1 TABLET BY MOUTH EVERY 4 TO 6 HOURS AS NEEDED 6/3/24  Yes Neal Oswald MD   amiodarone 200 mg tablet Take 1 tablet (200 mg total) by mouth daily with breakfast 1/28/25  Yes Rekha Mariano PA-C   aspirin (ECOTRIN) 325 mg EC tablet Take 325 mg by mouth daily   Yes Historical Provider, MD   atorvastatin (LIPITOR) 20 mg tablet TAKE 1 TABLET BY MOUTH EVERY DAY 5/4/24  Yes Neal Oswald MD   desvenlafaxine succinate (PRISTIQ)  50 mg 24 hr tablet Take 1 tablet (50 mg total) by mouth daily 8/28/24  Yes Neal Oswald MD   Eliquis 2.5 MG Take 1 tablet (2.5 mg total) by mouth 2 (two) times a day 12/2/24  Yes Rekha Mariano PA-C   furosemide (LASIX) 20 mg tablet Take 1 tablet (20 mg total) by mouth if needed (leg or ankle swelling, SOB, weight gain 3lbs in 1 day or 5 lbs in 1 week) 4/1/25  Yes Rekha Mariano PA-C   latanoprost (XALATAN) 0.005 % ophthalmic solution INSTILL 1 DROP INTO BOTH EYES AT BEDTIME 1/2/22  Yes Historical Provider, MD   losartan (COZAAR) 25 mg tablet TAKE 1 TABLET (25 MG TOTAL) BY MOUTH DAILY. 1/17/25  Yes Rekha Mariano PA-C   metoprolol succinate (TOPROL-XL) 50 mg 24 hr tablet TAKE 1 TABLET BY MOUTH EVERY 12 HOURS. 1/17/25  Yes Rekha Mariano PA-C   MULTIPLE VITAMINS-CALCIUM PO Take 1 tablet by mouth daily   Yes Historical Provider, MD   TRAVATAN Z 0.004 % ophthalmic solution Administer 1 drop to both eyes daily at bedtime 2/3/18  Yes Historical Provider, MD     No Known Allergies    Objective :  Temp:  [98.6 °F (37 °C)] 98.6 °F (37 °C)  HR:  [37-44] 37  BP: (165-193)/(76-87) 177/77  Resp:  [17-20] 20  SpO2:  [97 %-99 %] 97 %  O2 Device: None (Room air)    Physical Exam  Vitals and nursing note reviewed.   Constitutional:       Appearance: Normal appearance.   HENT:      Head: Normocephalic.   Eyes:      Conjunctiva/sclera: Conjunctivae normal.   Cardiovascular:      Rate and Rhythm: Bradycardia present.   Pulmonary:      Effort: Pulmonary effort is normal. No respiratory distress.      Breath sounds: No wheezing.   Abdominal:      General: Bowel sounds are normal. There is no distension.      Palpations: Abdomen is soft.   Musculoskeletal:         General: No swelling.      Right lower leg: No edema.      Left lower leg: No edema.   Skin:     General: Skin is warm and dry.   Neurological:      General: No focal deficit present.      Mental Status: She is alert. Mental status is at baseline.        Lines/Drains:      Lab Results: I  have reviewed the following results:  Results from last 7 days   Lab Units 05/13/25  1206   WBC Thousand/uL 6.25   HEMOGLOBIN g/dL 12.2   HEMATOCRIT % 37.2   PLATELETS Thousands/uL 204   SEGS PCT % 55   LYMPHO PCT % 37   MONO PCT % 7   EOS PCT % 1     Results from last 7 days   Lab Units 05/13/25  1100   SODIUM mmol/L 141   POTASSIUM mmol/L 3.4*   CHLORIDE mmol/L 109*   CO2 mmol/L 27   BUN mg/dL 18   CREATININE mg/dL 0.97   ANION GAP mmol/L 5   CALCIUM mg/dL 8.3*   ALBUMIN g/dL 3.5   TOTAL BILIRUBIN mg/dL 0.56   ALK PHOS U/L 77   ALT U/L 14   AST U/L 22   GLUCOSE RANDOM mg/dL 97             Lab Results   Component Value Date    HGBA1C 5.8 (H) 02/26/2024    HGBA1C 5.7 (H) 02/24/2023    HGBA1C 5.7 (H) 02/23/2022           Imaging Results Review: I personally reviewed the following image studies/reports in PACS and discussed pertinent findings with Radiology: none. My interpretation of the radiology images/reports is: none.  Other Study Results Review: EKG was reviewed.     ** Please Note: This note has been constructed using a voice recognition system. **

## 2025-05-13 NOTE — ASSESSMENT & PLAN NOTE
89-year-old female with PMH of depression, A-fib and heart failure presented with asymptomatic bradycardia  Had a scheduled appointment with her PCP today, vitals showed bradycardia and sent via EMS  Currently without symptoms, denying any chest pain, palpitations, dyspnea or lightheadedness/dizziness  Hold Toprol-XL 50 mg twice daily, monitor on telemetry  Consult cardiology

## 2025-05-14 VITALS
SYSTOLIC BLOOD PRESSURE: 158 MMHG | DIASTOLIC BLOOD PRESSURE: 72 MMHG | HEART RATE: 36 BPM | RESPIRATION RATE: 18 BRPM | BODY MASS INDEX: 24.1 KG/M2 | OXYGEN SATURATION: 99 % | TEMPERATURE: 97.9 F | WEIGHT: 136 LBS | HEIGHT: 63 IN

## 2025-05-14 DIAGNOSIS — R00.1 BRADYCARDIA: Primary | ICD-10-CM

## 2025-05-14 DIAGNOSIS — I48.0 PAF (PAROXYSMAL ATRIAL FIBRILLATION) (HCC): ICD-10-CM

## 2025-05-14 DIAGNOSIS — R00.1 ASYMPTOMATIC BRADYCARDIA: Primary | ICD-10-CM

## 2025-05-14 LAB
ANION GAP SERPL CALCULATED.3IONS-SCNC: 6 MMOL/L (ref 4–13)
BUN SERPL-MCNC: 15 MG/DL (ref 5–25)
CALCIUM SERPL-MCNC: 8.7 MG/DL (ref 8.4–10.2)
CHLORIDE SERPL-SCNC: 110 MMOL/L (ref 96–108)
CO2 SERPL-SCNC: 24 MMOL/L (ref 21–32)
CREAT SERPL-MCNC: 0.85 MG/DL (ref 0.6–1.3)
ERYTHROCYTE [DISTWIDTH] IN BLOOD BY AUTOMATED COUNT: 13.7 % (ref 11.6–15.1)
GFR SERPL CREATININE-BSD FRML MDRD: 61 ML/MIN/1.73SQ M
GLUCOSE P FAST SERPL-MCNC: 86 MG/DL (ref 65–99)
GLUCOSE SERPL-MCNC: 86 MG/DL (ref 65–140)
HCT VFR BLD AUTO: 38.7 % (ref 34.8–46.1)
HGB BLD-MCNC: 12.6 G/DL (ref 11.5–15.4)
MAGNESIUM SERPL-MCNC: 2.2 MG/DL (ref 1.9–2.7)
MCH RBC QN AUTO: 29.9 PG (ref 26.8–34.3)
MCHC RBC AUTO-ENTMCNC: 32.6 G/DL (ref 31.4–37.4)
MCV RBC AUTO: 92 FL (ref 82–98)
PLATELET # BLD AUTO: 198 THOUSANDS/UL (ref 149–390)
PMV BLD AUTO: 11.3 FL (ref 8.9–12.7)
POTASSIUM SERPL-SCNC: 4.1 MMOL/L (ref 3.5–5.3)
RBC # BLD AUTO: 4.21 MILLION/UL (ref 3.81–5.12)
SODIUM SERPL-SCNC: 140 MMOL/L (ref 135–147)
WBC # BLD AUTO: 5.96 THOUSAND/UL (ref 4.31–10.16)

## 2025-05-14 PROCEDURE — 83735 ASSAY OF MAGNESIUM: CPT | Performed by: STUDENT IN AN ORGANIZED HEALTH CARE EDUCATION/TRAINING PROGRAM

## 2025-05-14 PROCEDURE — 99214 OFFICE O/P EST MOD 30 MIN: CPT | Performed by: STUDENT IN AN ORGANIZED HEALTH CARE EDUCATION/TRAINING PROGRAM

## 2025-05-14 PROCEDURE — 80048 BASIC METABOLIC PNL TOTAL CA: CPT | Performed by: STUDENT IN AN ORGANIZED HEALTH CARE EDUCATION/TRAINING PROGRAM

## 2025-05-14 PROCEDURE — 85027 COMPLETE CBC AUTOMATED: CPT | Performed by: STUDENT IN AN ORGANIZED HEALTH CARE EDUCATION/TRAINING PROGRAM

## 2025-05-14 PROCEDURE — 99239 HOSP IP/OBS DSCHRG MGMT >30: CPT

## 2025-05-14 RX ORDER — AMIODARONE HYDROCHLORIDE 100 MG/1
100 TABLET ORAL
Status: DISCONTINUED | OUTPATIENT
Start: 2025-05-15 | End: 2025-05-14 | Stop reason: HOSPADM

## 2025-05-14 RX ORDER — AMLODIPINE BESYLATE 5 MG/1
5 TABLET ORAL DAILY
Qty: 30 TABLET | Refills: 0 | Status: SHIPPED | OUTPATIENT
Start: 2025-05-14 | End: 2025-06-13

## 2025-05-14 RX ORDER — LOSARTAN POTASSIUM 25 MG/1
50 TABLET ORAL DAILY
Qty: 90 TABLET | Refills: 0 | Status: SHIPPED | OUTPATIENT
Start: 2025-05-14

## 2025-05-14 RX ORDER — LOSARTAN POTASSIUM 50 MG/1
50 TABLET ORAL DAILY
Status: DISCONTINUED | OUTPATIENT
Start: 2025-05-15 | End: 2025-05-14 | Stop reason: HOSPADM

## 2025-05-14 RX ORDER — AMLODIPINE BESYLATE 5 MG/1
5 TABLET ORAL DAILY
Status: DISCONTINUED | OUTPATIENT
Start: 2025-05-14 | End: 2025-05-14 | Stop reason: HOSPADM

## 2025-05-14 RX ORDER — AMIODARONE HYDROCHLORIDE 100 MG/1
100 TABLET ORAL
Qty: 30 TABLET | Refills: 0 | Status: SHIPPED | OUTPATIENT
Start: 2025-05-14 | End: 2025-06-13

## 2025-05-14 RX ADMIN — AMLODIPINE BESYLATE 5 MG: 5 TABLET ORAL at 11:46

## 2025-05-14 RX ADMIN — ASPIRIN 325 MG: 325 TABLET ORAL at 08:17

## 2025-05-14 RX ADMIN — DESVENLAFAXINE 50 MG: 50 TABLET, FILM COATED, EXTENDED RELEASE ORAL at 08:29

## 2025-05-14 RX ADMIN — APIXABAN 2.5 MG: 2.5 TABLET, FILM COATED ORAL at 08:17

## 2025-05-14 RX ADMIN — AMIODARONE HYDROCHLORIDE 200 MG: 200 TABLET ORAL at 08:08

## 2025-05-14 RX ADMIN — LOSARTAN POTASSIUM 25 MG: 25 TABLET, FILM COATED ORAL at 08:17

## 2025-05-14 RX ADMIN — AMLODIPINE BESYLATE 5 MG: 5 TABLET ORAL at 08:17

## 2025-05-14 NOTE — PROGRESS NOTES
Progress Note - Cardiology   Duong Reece 89 y.o. female MRN: [id number]  Unit/Bed#: E4 -01 Encounter: [id number]          Assessment / Plan  Sinus Bradycardia POA (reason for hospital referral and cardiology consultation):     Paroxysmal AFib: presently sinus rhythm              - AF dx ~9/2024  - S/p successful ROSA/CV 11/27/24  - Anticoagulation PTA: Eliquis 5 mg twice daily  - AV blocking Rx PTA: Metoprolol succinate 50 mg twice daily  - Antiarrhythmic: Amiodarone 200 mg daily  - Historically poorly aware of heart rate and rhythm  HFrEF              - Dx 11/2024              - TTE 11/26/2024: LV normal size and wall thickness, EF 20%, global hypokinesia.  Severe LAE.  Trace AI.  Mild MR.  Mild-moderate TR.   - Limited echocardiogram 5/13/2025: LVEF 45% with mild global hypokinesia with some regional variation.  Severe biatrial dilation.  Mild MR/TR.              - Prehospital pharmacotherapy:                          *Beta-blocker: Toprol 50 mg twice daily                          *ARB: Losartan 25 mg daily                           *Diuretic: Lasix 20 mg as needed - none recently   Hyperlipidemia              - O/p Rx: Lipitor 20 mg daily    Echocardiogram yesterday showing improvement in LVEF-now 45%  Since admission, and despite withdrawal of beta-blocker patient's heart rate trend remains predominantly in the 40s  Despite her current rate trend she lacks any obvious symptomatology.  She does admit to some feelings of generalized fatigue and occasional brief lightheadedness though this seems more related to change in position and is inconsistent  Advised to continue off of beta-blocker.    Still hypertensive suggest to increase his prehospital dose of losartan from 25 to 50 mg daily.  Continue amlodipine 5 mg daily (new)  Continue Eliquis for stroke risk reduction and amiodarone but at reduced dose-100 mg daily   Post hospital event monitor was advised and discussed with the patient who is agreeable ~~>  ambulatory order placed  Pacemaker was discussed with the patient... 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  However, because she is generally asymptomatic she is reluctant.  This was further discussed in terms of what her opinions on implantation would be if she is found to have recurrent atrial fibrillation, pauses or other AV block, or symptomatic bradycardia and the morbidity associated with these. She expressed some trepidation and possible unwillingness to proceed even if it would mean increased morbidity or mortality, though she did indicate a possible willingness to revisit the conversation in the future guided by event monitor findings.    Okay for discharge from cardiology perspective        Subjective:  Complaints of urinary frequency but without dysuria.  No physical complaints.  Denies any chest pain, near-syncope, syncope, or dyspnea    Vitals:  Vitals:    05/13/25 1230 05/13/25 1633   Weight: 62 kg (136 lb 11 oz) 61.7 kg (136 lb)   ,  Vitals:    05/13/25 1940 05/13/25 2327 05/14/25 0253 05/14/25 0754   BP: 163/67 163/72 140/65 (!) 134/102   BP Location: Right arm Left arm Left arm Left arm   Pulse: (!) 45 (!) 46 (!) 44 (!) 41   Resp: 20 20 20 18   Temp: 98.8 °F (37.1 °C) 98.3 °F (36.8 °C) (!) 97.2 °F (36.2 °C) (!) 97.3 °F (36.3 °C)   TempSrc: Temporal Temporal Temporal Temporal   SpO2: 97% 96% 96% 96%   Weight:       Height:           Exam:  General:  Alert normally conversant and comfortable-appearing  Heart:  Regular with low rate and no significant pathologic murmur  Lungs:  Clear throughout  Lower Limbs:  No edema           Telemetry:       Sinus bradycardia.  No other dysrhythmia no pauses    Heart rate trend        Pertinent medications:  Current Medications[1]  Amiodarone 200 mg daily  Amlodipine 5 mg daily  Cozaar 25 mg daily  Lipitor 20 mg daily   Eliquis 2.5 mg twice daily  Aspirin 81  mg daily      Labs/Data:        Results from last 7 days   Lab Units 05/14/25  0433 05/13/25  1206   WBC Thousand/uL 5.96 6.25   HEMOGLOBIN g/dL 12.6 12.2   HEMATOCRIT % 38.7 37.2   PLATELETS Thousands/uL 198 204     Results from last 7 days   Lab Units 05/14/25  0433 05/13/25  1100   POTASSIUM mmol/L 4.1 3.4*   CHLORIDE mmol/L 110* 109*   CO2 mmol/L 24 27   BUN mg/dL 15 18   CREATININE mg/dL 0.85 0.97              [1]   Current Facility-Administered Medications:     acetaminophen (TYLENOL) tablet 650 mg, 650 mg, Oral, Q6H PRN, Gonzalo Bush MD    ALPRAZolam (XANAX) tablet 0.5 mg, 0.5 mg, Oral, TID PRN, Gonzalo Bush MD, 0.5 mg at 05/13/25 2329    amiodarone tablet 200 mg, 200 mg, Oral, Daily With Breakfast, Gonzalo Bush MD, 200 mg at 05/14/25 0808    amLODIPine (NORVASC) tablet 5 mg, 5 mg, Oral, Daily, Gonzalo Bush MD, 5 mg at 05/14/25 0817    apixaban (ELIQUIS) tablet 2.5 mg, 2.5 mg, Oral, BID, Gonzalo Bush MD, 2.5 mg at 05/14/25 0817    aspirin (ECOTRIN) EC tablet 325 mg, 325 mg, Oral, Daily, Gonzalo Bush MD, 325 mg at 05/14/25 0817    atorvastatin (LIPITOR) tablet 20 mg, 20 mg, Oral, Daily With Dinner, Gonzalo Bush MD    desvenlafaxine succinate (PRISTIQ) 24 hr tablet 50 mg, 50 mg, Oral, Daily, Gonzalo Bush MD, 50 mg at 05/14/25 0829    latanoprost (XALATAN) 0.005 % ophthalmic solution 1 drop, 1 drop, Both Eyes, HS, Gonzalo Bush MD, 1 drop at 05/13/25 2138    losartan (COZAAR) tablet 25 mg, 25 mg, Oral, Daily, Gonzalo Bush MD, 25 mg at 05/14/25 0817    ondansetron (ZOFRAN) injection 4 mg, 4 mg, Intravenous, Q6H PRN, Gonzalo Bush MD    travoprost (TRAVATAN-Z) 0.004 % ophthalmic solution 1 drop, 1 drop, Both Eyes, HS, Gonzalo Bush MD

## 2025-05-14 NOTE — ASSESSMENT & PLAN NOTE
89-year-old female with PMH of depression, A-fib and heart failure presented with asymptomatic bradycardia.Had a scheduled appointment with her PCP 5/13/2025, vitals showed bradycardia and sent via EMS    Currently without symptoms, denying any chest pain, palpitations, dyspnea or lightheadedness/dizziness  Was monitored on telemetry no acute events no heart block  Metoprolol discontinued  Cardiology on board, agrees with discharge with medication changes-metoprolol discontinued, amiodarone decreased from 200 to 100 mg daily, losartan increased from 25-50 daily, amlodipine 5 mg added.  Getting Zio patch via mail need to follow-up with cardiology outpatient for further management.  Patient anxious to leave states that she cannot stay in the hospital it makes her more anxious.

## 2025-05-14 NOTE — DISCHARGE INSTR - AVS FIRST PAGE
Follow-up:    - Follow-up with your PCP within 1 week of discharge  - Follow-up with cardiology-referral placed    Medication changes:    - Start taking new medication amlodipine 5 mg daily for blood pressure  - Amiodarone dose decreased from 200 mg daily to 100 mg daily in light of your low heart rate.  - Increase losartan (Cozaar) from 25 to 50 mg (2 tablets) daily  - Stop taking metoprolol in light of your low heart rate      You will be getting Zio patch/cardiac monitoring via mail to your house follow-up with instructions and cardiology office so that if any further intervention needs to be done can be discussed with you in the follow-up outpatient visit.

## 2025-05-14 NOTE — ASSESSMENT & PLAN NOTE
Wt Readings from Last 3 Encounters:   05/13/25 61.7 kg (136 lb)   12/17/24 62.1 kg (137 lb)   12/02/24 65.5 kg (144 lb 6.4 oz)     Appears euvolemic  Continue lasix 20mg daily, hold toprol due to bradycardia

## 2025-05-14 NOTE — PLAN OF CARE
Problem: Potential for Falls  Goal: Patient will remain free of falls  Description: INTERVENTIONS:- Educate patient/family on patient safety including physical limitations- Instruct patient to call for assistance with activity - Consult OT/PT to assist with strengthening/mobility - Keep Call bell within reach- Keep bed low and locked with side rails adjusted as appropriate- Keep care items and personal belongings within reach- Initiate and maintain comfort rounds- Make Fall Risk Sign visible to staff- Offer Toileting every 2 Hours, in advance of need- Initiate/Maintain alarm- Obtain necessary fall risk management equipment- Apply yellow socks and bracelet for high fall risk patients- Consider moving patient to room near nurses station  Outcome: Progressing     Problem: PAIN - ADULT  Goal: Verbalizes/displays adequate comfort level or baseline comfort level  Description: Interventions:- Encourage patient to monitor pain and request assistance- Assess pain using appropriate pain scale- Administer analgesics based on type and severity of pain and evaluate response- Implement non-pharmacological measures as appropriate and evaluate response- Consider cultural and social influences on pain and pain management- Notify physician/advanced practitioner if interventions unsuccessful or patient reports new pain  Outcome: Progressing     Problem: DISCHARGE PLANNING  Goal: Discharge to home or other facility with appropriate resources  Description: INTERVENTIONS:- Identify barriers to discharge w/patient and caregiver- Arrange for needed discharge resources and transportation as appropriate- Identify discharge learning needs (meds, wound care, etc.)- Arrange for interpretive services to assist at discharge as needed- Refer to Case Management Department for coordinating discharge planning if the patient needs post-hospital services based on physician/advanced practitioner order or complex needs related to functional status,  cognitive ability, or social support system  Outcome: Progressing     Problem: CARDIOVASCULAR - ADULT  Goal: Maintains optimal cardiac output and hemodynamic stability  Description: INTERVENTIONS:- Monitor I/O, vital signs and rhythm- Monitor for S/S and trends of decreased cardiac output- Administer and titrate ordered vasoactive medications to optimize hemodynamic stability- Assess quality of pulses, skin color and temperature- Assess for signs of decreased coronary artery perfusion- Instruct patient to report change in severity of symptoms  Outcome: Progressing  Goal: Absence of cardiac dysrhythmias or at baseline rhythm  Description: INTERVENTIONS:- Continuous cardiac monitoring, vital signs, obtain 12 lead EKG if ordered- Administer antiarrhythmic and heart rate control medications as ordered- Monitor electrolytes and administer replacement therapy as ordered  Outcome: Progressing

## 2025-05-14 NOTE — PLAN OF CARE
Problem: Potential for Falls  Goal: Patient will remain free of falls  Description: INTERVENTIONS:- Educate patient/family on patient safety including physical limitations- Instruct patient to call for assistance with activity - Consult OT/PT to assist with strengthening/mobility - Keep Call bell within reach- Keep bed low and locked with side rails adjusted as appropriate- Keep care items and personal belongings within reach- Initiate and maintain comfort rounds- Make Fall Risk Sign visible to staff- Offer Toileting every 2 Hours, in advance of need- Initiate/Maintain bed alarm- Obtain necessary fall risk management equipment: bed alarm- Apply yellow socks and bracelet for high fall risk patients- Consider moving patient to room near nurses station  Outcome: Progressing     Problem: PAIN - ADULT  Goal: Verbalizes/displays adequate comfort level or baseline comfort level  Description: Interventions:- Encourage patient to monitor pain and request assistance- Assess pain using appropriate pain scale- Administer analgesics based on type and severity of pain and evaluate response- Implement non-pharmacological measures as appropriate and evaluate response- Consider cultural and social influences on pain and pain management- Notify physician/advanced practitioner if interventions unsuccessful or patient reports new pain  Outcome: Progressing     Problem: DISCHARGE PLANNING  Goal: Discharge to home or other facility with appropriate resources  Description: INTERVENTIONS:- Identify barriers to discharge w/patient and caregiver- Arrange for needed discharge resources and transportation as appropriate- Identify discharge learning needs (meds, wound care, etc.)- Arrange for interpretive services to assist at discharge as needed- Refer to Case Management Department for coordinating discharge planning if the patient needs post-hospital services based on physician/advanced practitioner order or complex needs related to  functional status, cognitive ability, or social support system  Outcome: Progressing     Problem: CARDIOVASCULAR - ADULT  Goal: Maintains optimal cardiac output and hemodynamic stability  Description: INTERVENTIONS:- Monitor I/O, vital signs and rhythm- Monitor for S/S and trends of decreased cardiac output- Administer and titrate ordered vasoactive medications to optimize hemodynamic stability- Assess quality of pulses, skin color and temperature- Assess for signs of decreased coronary artery perfusion- Instruct patient to report change in severity of symptoms  Outcome: Progressing  Goal: Absence of cardiac dysrhythmias or at baseline rhythm  Description: INTERVENTIONS:- Continuous cardiac monitoring, vital signs, obtain 12 lead EKG if ordered- Administer antiarrhythmic and heart rate control medications as ordered- Monitor electrolytes and administer replacement therapy as ordered  Outcome: Progressing

## 2025-05-14 NOTE — DISCHARGE SUMMARY
Discharge Summary - Hospitalist   Name: Duong Reece 89 y.o. female I MRN: [id number]  Unit/Bed#: E4 -01 I Date of Admission: 5/13/2025   Date of Service: 5/14/2025 I Hospital Day: 0     Assessment & Plan  Asymptomatic bradycardia  89-year-old female with PMH of depression, A-fib and heart failure presented with asymptomatic bradycardia.Had a scheduled appointment with her PCP 5/13/2025, vitals showed bradycardia and sent via EMS    Currently without symptoms, denying any chest pain, palpitations, dyspnea or lightheadedness/dizziness  Was monitored on telemetry no acute events no heart block  Metoprolol discontinued  Cardiology on board, agrees with discharge with medication changes-metoprolol discontinued, amiodarone decreased from 200 to 100 mg daily, losartan increased from 25-50 daily, amlodipine 5 mg added.  Getting Zio patch via mail need to follow-up with cardiology outpatient for further management.  Patient anxious to leave states that she cannot stay in the hospital it makes her more anxious.  Depression with anxiety  Continue Pristiq and xanax as needed  Mixed hyperlipidemia  Continue statin  PAF (paroxysmal atrial fibrillation) (HCC)  S/p cardioversion in 2024 but had remained in A-fib  Had been rate controlled with Toprol-XL 50 mg twice a day and amiodarone 200 mg daily  Holding metoprolol in the setting of bradycardia  Continue Eliquis for anticoagulation  Chronic systolic congestive heart failure (HCC)  Wt Readings from Last 3 Encounters:   05/13/25 61.7 kg (136 lb)   12/17/24 62.1 kg (137 lb)   12/02/24 65.5 kg (144 lb 6.4 oz)     Appears euvolemic  Continue lasix 20mg daily, hold toprol due to bradycardia  Hypokalemia  Replete, check mag  Primary hypertension  Metoprolol discontinued  Amlodipine 5 mg added, losartan dose increased from 25-50     Medical Problems       Resolved Problems  Date Reviewed: 2/26/2024   None       Discharging Physician / Practitioner: Steffi Kramer MD  PCP: Duong  "Bess Oliveros  Admission Date:   Admission Orders (From admission, onward)       Ordered        05/13/25 1218  Place in Observation  Once                          Discharge Date: 05/14/25    Consultations During Hospital Stay:  Cardiology    Procedures Performed:   N/A    Significant Findings / Test Results:   No results found.      Incidental Findings:   N/A    Test Results Pending at Discharge (will require follow up):   N/A     Outpatient Tests Requested:  N/A    Complications: N/A    Reason for Admission: Asymptomatic sinus bradycardia    Hospital Course:   Duong Reece is a 89 y.o. female patient who originally presented to the hospital on 5/13/2025 due to symptomatic sinus bradycardia  Send rest as above under assessment plan    Condition at Discharge: fair    Discharge Day Visit / Exam:   Subjective: Seen examined bedside no acute concerns as of yet.  Vitals: Blood Pressure: (!) 173/71 (05/14/25 1112)  Pulse: (!) 36 (05/14/25 1112)  Temperature: 97.9 °F (36.6 °C) (05/14/25 1112)  Temp Source: Temporal (05/14/25 1112)  Respirations: 18 (05/14/25 1112)  Height: 5' 3\" (160 cm) (05/13/25 1633)  Weight - Scale: 61.7 kg (136 lb) (05/13/25 1633)  SpO2: 99 % (05/14/25 1112)  Physical Exam  Vitals and nursing note reviewed.   Constitutional:       General: She is not in acute distress.     Appearance: She is well-developed.   HENT:      Head: Normocephalic and atraumatic.     Eyes:      Conjunctiva/sclera: Conjunctivae normal.       Cardiovascular:      Rate and Rhythm: Bradycardia present.      Heart sounds: No murmur heard.  Pulmonary:      Effort: Pulmonary effort is normal. No respiratory distress.      Breath sounds: Normal breath sounds.   Abdominal:      Palpations: Abdomen is soft.      Tenderness: There is no abdominal tenderness.     Musculoskeletal:         General: No swelling.      Cervical back: Neck supple.     Skin:     General: Skin is warm and dry.      Capillary Refill: Capillary refill takes less " than 2 seconds.     Neurological:      Mental Status: She is alert and oriented to person, place, and time.     Psychiatric:         Mood and Affect: Mood normal.          Discussion with Family: Patient declined call to .     Discharge instructions/Information to patient and family:   See after visit summary for information provided to patient and family.      Provisions for Follow-Up Care:  See after visit summary for information related to follow-up care and any pertinent home health orders.      Mobility at time of Discharge:   Basic Mobility Inpatient Raw Score: 24  -[facility] Goal: 8: Walk 250 feet or more  -HLM Achieved: 1: Laying in bed       Disposition:   Home    Planned Readmission: N/A    Discharge Medications:  See after visit summary for reconciled discharge medications provided to patient and/or family.      Administrative Statements   Discharge Statement:  I have spent a total time of >40 minutes in caring for this patient on the day of the visit/encounter. >30 minutes of time was spent on: Risks and benefits of tx options, Patient and family education, Risk factor reductions, Counseling / Coordination of care, and Reviewing / ordering tests, medicine, procedures  .    **Please Note: This note may have been constructed using a voice recognition system**

## 2025-05-14 NOTE — CASE MANAGEMENT
Case Management Assessment & Discharge Planning Note    Patient name Duong Reece  Location [facility] /E4 -* MRN [id number]  : 1935 Date 2025       Current Admission Date: 2025  Current Admission Diagnosis:Asymptomatic bradycardia   Patient Active Problem List    Diagnosis Date Noted    Asymptomatic bradycardia 2025    Hypokalemia 2025    Primary hypertension 2025    History of hysterectomy 2024    Chronic systolic congestive heart failure (HCC) 2024    PAF (paroxysmal atrial fibrillation) (HCC) 2024    Urticaria, acute 2023    Current mild episode of major depressive disorder without prior episode ([facility]) 2020    Overweight (BMI 25.0-29.9) 2019    Hyperglycemia 2018    Cervical disc disease 2018    Pain of right heel 2017    Benign paroxysmal positional vertigo 06/15/2016    Depression with anxiety 2015    Osteopenia 2014    Glaucoma 2013    Esophagitis, reflux 2012    Mixed hyperlipidemia 2012    Anxiety 2012      LOS (days): 0  Geometric Mean LOS (GMLOS) (days):   Days to GMLOS:     OBJECTIVE:              Current admission status: Observation       Preferred Pharmacy:   [facility]/pharmacy #[id number] - [place] - [address] [place] [address]  [place] [id number]  Phone: [phone] Fax: [phone]    Primary Care Provider: Duong Oliveros    Primary Insurance: [facility]  Secondary Insurance:     ASSESSMENT:  Active Health Care Proxies       Nelly Patterson Health Care Representative - Child   Primary Phone: [phone] (Mobile)                           Readmission Root Cause  30 Day Readmission: No    Patient Information  Admitted from:: Home  Mental Status: Alert  During Assessment patient was accompanied by: Not accompanied during assessment  Assessment information provided by:: Patient  Primary Caregiver: Self  Support Systems: Family members, [facility]  of Residence: [place]  What city do you live in?: [place]  Home entry access options. Select all that apply.: Elevator  Type of Current Residence: Apartment  Floor Level: 5  Upon entering residence, is there a bedroom on the main floor (no further steps)?: Yes  Upon entering residence, is there a bathroom on the main floor (no further steps)?: Yes  Living Arrangements: Lives Alone  Is patient a ?: No    Activities of Daily Living Prior to Admission  Functional Status: Independent  Completes ADLs independently?: Yes  Ambulates independently?: Yes  Does patient use assisted devices?: Yes  Assisted Devices (DME) used: Straight Cane  Does patient currently own DME?: Yes  What DME does the patient currently own?: Straight Cane  Does patient have a history of Outpatient Therapy (PT/OT)?: Yes  Does the patient have a history of Short-Term Rehab?: No  Does patient have a history of HHC?: No  Does patient currently have HHC?: No         Patient Information Continued  Income Source: Pension/long-term  Does patient have prescription coverage?: Yes  Can the patient afford their medications and any related supplies (such as glucometers or test strips)?: Yes  Does patient receive dialysis treatments?: No  Does patient have a history of substance abuse?: No  Does patient have a history of Mental Health Diagnosis?: Yes (Anxiety and Depression)  Is patient receiving treatment for mental health?: Yes  Has patient received inpatient treatment related to mental health in the last 2 years?: No         Means of Transportation  Means of Transport to Appts:: Drives Self          DISCHARGE DETAILS:    Discharge planning discussed with:: Patient  Freedom of Choice: Yes  Comments - Freedom of Choice: No identified needs at this time  CM contacted family/caregiver?: No- see comments (Declined')  Were Treatment Team discharge recommendations reviewed with patient/caregiver?: Yes  Did patient/caregiver verbalize understanding of patient  care needs?: Yes  Were patient/caregiver advised of the risks associated with not following Treatment Team discharge recommendations?: Yes         Requested Home Health Care         Is the patient interested in HHC at discharge?: No    DME Referral Provided  Referral made for DME?: No         Would you like to participate in our [facility] service program?  : No - Declined    Treatment Team Recommendation: Home  Discharge Destination Plan:: Home  Transport at Discharge : Family              CM met with patient at bedside to introduce self and role with DC planning. Patient resides alone in an apartment with elevator access. Patient was independent PTA, she drives and utilizes a cane as needed. Patient reports that family will transport her home at time of DC. Patient does not anticipate any CM needs at this time, CM department remains available.

## 2025-05-15 ENCOUNTER — TELEPHONE (OUTPATIENT)
Dept: CARDIOLOGY CLINIC | Facility: CLINIC | Age: 89
End: 2025-05-15

## 2025-05-15 NOTE — TELEPHONE ENCOUNTER
----- Message from Tacos Smith MD sent at 5/14/2025  3:24 PM EDT -----  I reordered it under a different encounter.  Let me know if that worked.  Thanks  ----- Message -----  From: Parul Pretty MA  Sent: 5/14/2025   1:56 PM EDT  To: Tacos Smith MD    Hi Dr. Smith. The Zio needs to be ordered under  [facility]. When it's ordered under cardiovascular physician, it doesn't link to [employer]. Can you change the order?  ----- Message -----  From: Tacos Smith MD  Sent: 5/14/2025   1:38 PM EDT  To: Cardiology [facility] this patient needs a 2-week Zio patch for her bradycardia.  They have ordered it as an ambulatory monitor and I reordered as a ZIO.  She also need a follow-up appointment in 6 to 8 weeks with one of the APs if possible.  She can also see me at one of my Platinum days.  Thanks

## 2025-05-15 NOTE — TELEPHONE ENCOUNTER
Placed call to patient to use Zio instructions. She verbalized understanding and has no questions or concerns at this time.

## 2025-07-10 DIAGNOSIS — I48.91 RAPID ATRIAL FIBRILLATION (HCC): ICD-10-CM

## 2025-07-10 DIAGNOSIS — I50.21 ACUTE SYSTOLIC CONGESTIVE HEART FAILURE (HCC): ICD-10-CM

## 2025-07-10 NOTE — TELEPHONE ENCOUNTER
Fax refill request from [facility]- [place]    Eliquis 2.5 mg  One bid    Last office visit 12/17/24 Rekha Mariano  Needs follow up appointment, will notify clerical staff

## 2025-07-10 NOTE — TELEPHONE ENCOUNTER
----- Message from Vika BENITEZ sent at 7/10/2025 10:29 AM EDT -----  Patient needs overdue follow up appointment.  Last seen by Rekha 12/17/24  Was scheduled with Dr. Campbell a couple times but appointment were cancelled by provider/weather

## 2025-07-11 RX ORDER — APIXABAN 2.5 MG/1
2.5 TABLET, FILM COATED ORAL 2 TIMES DAILY
Qty: 60 TABLET | Refills: 0 | Status: SHIPPED | OUTPATIENT
Start: 2025-07-11

## 2025-07-11 NOTE — TELEPHONE ENCOUNTER
Patient needs an appointment. Please contact the patient to schedule an appointment. Last office visit: 12/17/24  Valid encounter within last 6 months  Courtesy refill provided.

## 2025-07-15 DIAGNOSIS — I48.91 ATRIAL FIBRILLATION WITH RAPID VENTRICULAR RESPONSE (HCC): ICD-10-CM

## 2025-07-17 RX ORDER — LOSARTAN POTASSIUM 25 MG/1
50 TABLET ORAL DAILY
Qty: 90 TABLET | Refills: 0 | Status: SHIPPED | OUTPATIENT
Start: 2025-07-17